# Patient Record
Sex: FEMALE | Race: WHITE | NOT HISPANIC OR LATINO | Employment: OTHER | URBAN - METROPOLITAN AREA
[De-identification: names, ages, dates, MRNs, and addresses within clinical notes are randomized per-mention and may not be internally consistent; named-entity substitution may affect disease eponyms.]

---

## 2017-04-11 ENCOUNTER — ALLSCRIPTS OFFICE VISIT (OUTPATIENT)
Dept: OTHER | Facility: OTHER | Age: 72
End: 2017-04-11

## 2017-09-11 ENCOUNTER — ALLSCRIPTS OFFICE VISIT (OUTPATIENT)
Dept: OTHER | Facility: OTHER | Age: 72
End: 2017-09-11

## 2017-10-04 ENCOUNTER — APPOINTMENT (OUTPATIENT)
Dept: PHYSICAL THERAPY | Facility: CLINIC | Age: 72
End: 2017-10-04
Payer: COMMERCIAL

## 2017-10-04 PROCEDURE — 97162 PT EVAL MOD COMPLEX 30 MIN: CPT

## 2017-10-04 PROCEDURE — G8990 OTHER PT/OT CURRENT STATUS: HCPCS

## 2017-10-04 PROCEDURE — G8991 OTHER PT/OT GOAL STATUS: HCPCS

## 2017-10-11 ENCOUNTER — APPOINTMENT (OUTPATIENT)
Dept: PHYSICAL THERAPY | Facility: CLINIC | Age: 72
End: 2017-10-11
Payer: COMMERCIAL

## 2017-10-11 PROCEDURE — 97530 THERAPEUTIC ACTIVITIES: CPT

## 2017-10-18 ENCOUNTER — APPOINTMENT (OUTPATIENT)
Dept: PHYSICAL THERAPY | Facility: CLINIC | Age: 72
End: 2017-10-18
Payer: COMMERCIAL

## 2017-10-18 PROCEDURE — 97112 NEUROMUSCULAR REEDUCATION: CPT | Performed by: PHYSICAL THERAPIST

## 2017-10-18 PROCEDURE — 97110 THERAPEUTIC EXERCISES: CPT | Performed by: PHYSICAL THERAPIST

## 2017-10-25 ENCOUNTER — APPOINTMENT (OUTPATIENT)
Dept: PHYSICAL THERAPY | Facility: CLINIC | Age: 72
End: 2017-10-25
Payer: COMMERCIAL

## 2017-11-01 ENCOUNTER — APPOINTMENT (OUTPATIENT)
Dept: PHYSICAL THERAPY | Facility: CLINIC | Age: 72
End: 2017-11-01
Payer: COMMERCIAL

## 2017-11-01 PROCEDURE — 97110 THERAPEUTIC EXERCISES: CPT

## 2017-11-08 ENCOUNTER — APPOINTMENT (OUTPATIENT)
Dept: PHYSICAL THERAPY | Facility: CLINIC | Age: 72
End: 2017-11-08
Payer: COMMERCIAL

## 2017-11-09 ENCOUNTER — APPOINTMENT (OUTPATIENT)
Dept: PHYSICAL THERAPY | Facility: CLINIC | Age: 72
End: 2017-11-09
Payer: COMMERCIAL

## 2017-11-09 PROCEDURE — G8991 OTHER PT/OT GOAL STATUS: HCPCS

## 2017-11-09 PROCEDURE — 97112 NEUROMUSCULAR REEDUCATION: CPT

## 2017-11-09 PROCEDURE — 97110 THERAPEUTIC EXERCISES: CPT

## 2017-11-09 PROCEDURE — G8990 OTHER PT/OT CURRENT STATUS: HCPCS

## 2018-01-06 ENCOUNTER — HOSPITAL ENCOUNTER (EMERGENCY)
Facility: HOSPITAL | Age: 73
Discharge: HOME | End: 2018-01-06
Attending: EMERGENCY MEDICINE
Payer: COMMERCIAL

## 2018-01-06 VITALS
HEIGHT: 63 IN | OXYGEN SATURATION: 97 % | WEIGHT: 145 LBS | DIASTOLIC BLOOD PRESSURE: 67 MMHG | RESPIRATION RATE: 18 BRPM | BODY MASS INDEX: 25.69 KG/M2 | TEMPERATURE: 97.5 F | SYSTOLIC BLOOD PRESSURE: 128 MMHG | HEART RATE: 87 BPM

## 2018-01-06 DIAGNOSIS — S50.12XA CONTUSION OF LEFT FOREARM, INITIAL ENCOUNTER: Primary | ICD-10-CM

## 2018-01-06 LAB
ALBUMIN SERPL BCP-MCNC: 3.6 G/DL (ref 3.5–5)
ALP SERPL-CCNC: 91 U/L (ref 46–116)
ALT SERPL W P-5'-P-CCNC: 9 U/L (ref 12–78)
ANION GAP SERPL CALCULATED.3IONS-SCNC: 8 MMOL/L (ref 4–13)
APTT PPP: 28 SECONDS (ref 23–35)
AST SERPL W P-5'-P-CCNC: 17 U/L (ref 5–45)
BASOPHILS # BLD AUTO: 0.07 THOUSANDS/ΜL (ref 0–0.1)
BASOPHILS NFR BLD AUTO: 1 % (ref 0–1)
BILIRUB SERPL-MCNC: 0.2 MG/DL (ref 0.2–1)
BUN SERPL-MCNC: 23 MG/DL (ref 5–25)
CALCIUM SERPL-MCNC: 8.9 MG/DL (ref 8.3–10.1)
CHLORIDE SERPL-SCNC: 105 MMOL/L (ref 100–108)
CO2 SERPL-SCNC: 28 MMOL/L (ref 21–32)
CREAT SERPL-MCNC: 0.99 MG/DL (ref 0.6–1.3)
EOSINOPHIL # BLD AUTO: 0.47 THOUSAND/ΜL (ref 0–0.61)
EOSINOPHIL NFR BLD AUTO: 5 % (ref 0–6)
ERYTHROCYTE [DISTWIDTH] IN BLOOD BY AUTOMATED COUNT: 13.1 % (ref 11.6–15.1)
GFR SERPL CREATININE-BSD FRML MDRD: 58 ML/MIN/1.73SQ M
GLUCOSE SERPL-MCNC: 114 MG/DL (ref 65–140)
HCT VFR BLD AUTO: 41.9 % (ref 34.8–46.1)
HGB BLD-MCNC: 13.9 G/DL (ref 11.5–15.4)
INR PPP: 1.04 (ref 0.86–1.16)
LYMPHOCYTES # BLD AUTO: 2.37 THOUSANDS/ΜL (ref 0.6–4.47)
LYMPHOCYTES NFR BLD AUTO: 27 % (ref 14–44)
MCH RBC QN AUTO: 29.6 PG (ref 26.8–34.3)
MCHC RBC AUTO-ENTMCNC: 33.2 G/DL (ref 31.4–37.4)
MCV RBC AUTO: 89 FL (ref 82–98)
MONOCYTES # BLD AUTO: 0.69 THOUSAND/ΜL (ref 0.17–1.22)
MONOCYTES NFR BLD AUTO: 8 % (ref 4–12)
NEUTROPHILS # BLD AUTO: 5.03 THOUSANDS/ΜL (ref 1.85–7.62)
NEUTS SEG NFR BLD AUTO: 58 % (ref 43–75)
NRBC BLD AUTO-RTO: 0 /100 WBCS
PLATELET # BLD AUTO: 232 THOUSANDS/UL (ref 149–390)
PMV BLD AUTO: 10.3 FL (ref 8.9–12.7)
POTASSIUM SERPL-SCNC: 3.9 MMOL/L (ref 3.5–5.3)
PROT SERPL-MCNC: 6.8 G/DL (ref 6.4–8.2)
PROTHROMBIN TIME: 13.8 SECONDS (ref 12.1–14.4)
RBC # BLD AUTO: 4.7 MILLION/UL (ref 3.81–5.12)
SODIUM SERPL-SCNC: 141 MMOL/L (ref 136–145)
WBC # BLD AUTO: 8.67 THOUSAND/UL (ref 4.31–10.16)

## 2018-01-06 PROCEDURE — 85025 COMPLETE CBC W/AUTO DIFF WBC: CPT | Performed by: EMERGENCY MEDICINE

## 2018-01-06 PROCEDURE — 36415 COLL VENOUS BLD VENIPUNCTURE: CPT | Performed by: EMERGENCY MEDICINE

## 2018-01-06 PROCEDURE — 85730 THROMBOPLASTIN TIME PARTIAL: CPT | Performed by: EMERGENCY MEDICINE

## 2018-01-06 PROCEDURE — 85610 PROTHROMBIN TIME: CPT | Performed by: EMERGENCY MEDICINE

## 2018-01-06 PROCEDURE — 99283 EMERGENCY DEPT VISIT LOW MDM: CPT

## 2018-01-06 PROCEDURE — 80053 COMPREHEN METABOLIC PANEL: CPT | Performed by: EMERGENCY MEDICINE

## 2018-01-06 RX ORDER — ASPIRIN 81 MG/1
325 TABLET ORAL DAILY PRN
COMMUNITY
End: 2022-02-08

## 2018-01-06 RX ORDER — RASAGILINE 1 MG/1
TABLET ORAL
COMMUNITY
End: 2018-06-06 | Stop reason: SDUPTHER

## 2018-01-06 RX ORDER — CHOLECALCIFEROL (VITAMIN D3) 50 MCG
TABLET ORAL
COMMUNITY
Start: 2017-11-13 | End: 2022-07-01 | Stop reason: CLARIF

## 2018-01-06 RX ORDER — LEVOTHYROXINE SODIUM 0.05 MG/1
50 TABLET ORAL
COMMUNITY
Start: 2017-03-17 | End: 2018-12-14 | Stop reason: SDUPTHER

## 2018-01-07 NOTE — ED PROVIDER NOTES
History  Chief Complaint   Patient presents with    Arm Pain     Pt presents with bruising to left arm that appeared last night and is tender to touch  Pt denies any recent injury  Pt takes daily baby aspirin  Patient is a 78-year-old female  She developed a spontaneous bruised her left forearm yesterday  It was worse today  She has no prior history of bruising  She denies trauma or overuse  She does not take oral anticoagulants  She does take aspirin  Symptoms are mild to moderate in intensity  No aggravating or relieving factors  She denies other associated bleeding  No hematochezia, melena cyst or melena  No hemoptysis  No hematuria  Prior to Admission Medications   Prescriptions Last Dose Informant Patient Reported? Taking? Cholecalciferol (VITAMIN D) 2000 units tablet   Yes Yes   Si daily   Cranberry 1000 MG CAPS   Yes Yes   Sig: Take 4,200 mg by mouth   Homeopathic Products (PROSACEA) GEL   Yes Yes   Sig: Apply topically   Multiple Vitamin (MULTIVITAMINS PO)   Yes Yes   Sig: daily   aspirin (ECOTRIN LOW STRENGTH) 81 mg EC tablet   Yes Yes   Sig: Take 81 mg by mouth daily   carbidopa-levodopa (SINEMET)  mg per tablet   Yes Yes   levothyroxine 50 mcg tablet   Yes Yes   Sig: Take 50 mcg by mouth   psyllium (METAMUCIL SMOOTH TEXTURE) 58 6 % powder   Yes Yes   Sig: Take by mouth   rasagiline (AZILECT) 1 MG   Yes Yes   Sig: Take by mouth   rotigotine (NEUPRO) 4 MG/24HR   Yes Yes   Sig: Place 1 patch on the skin daily      Facility-Administered Medications: None       Past Medical History:   Diagnosis Date    Disease of thyroid gland     Osteoporosis     Parkinson disease ([facility] [address] )     Rosacea        Past Surgical History:   Procedure Laterality Date    FOOT SURGERY Bilateral     HYSTERECTOMY         History reviewed  No pertinent family history  I have reviewed and agree with the history as documented      Social History   Substance Use Topics    Smoking status: Never Smoker    Smokeless tobacco: Never Used    Alcohol use No        Review of Systems   Constitutional: Negative for chills and fever  Gastrointestinal: Negative for blood in stool  Genitourinary: Negative for hematuria and vaginal bleeding  Hematological: Does not bruise/bleed easily  All other systems reviewed and are negative  Physical Exam  ED Triage Vitals [01/06/18 2128]   Temperature Pulse Respirations Blood Pressure SpO2   97 5 °F (36 4 °C) 87 18 128/67 97 %      Temp Source Heart Rate Source Patient Position - Orthostatic VS BP Location FiO2 (%)   Oral Monitor Sitting Right arm --      Pain Score       2           Orthostatic Vital Signs  Vitals:    01/06/18 2128   BP: 128/67   Pulse: 87   Patient Position - Orthostatic VS: Sitting       Physical Exam   Constitutional: She is oriented to person, place, and time  She appears well-developed and well-nourished  HENT:   Head: Normocephalic and atraumatic  Eyes: Conjunctivae are normal  Right eye exhibits no discharge  Left eye exhibits no discharge  Neck: Normal range of motion  Neck supple  Pulmonary/Chest: Effort normal  No respiratory distress  Musculoskeletal: Normal range of motion  She exhibits tenderness  She exhibits no edema or deformity  There is a contusion with tenderness to the left forearm  No bony tenderness  Neurovascular exam is normal  No palpable cords  No erythema or warmth  Neurological: She is alert and oriented to person, place, and time  Skin: Skin is warm and dry  Psychiatric: She has a normal mood and affect  Her behavior is normal    Vitals reviewed        ED Medications  Medications - No data to display    Diagnostic Studies  Results Reviewed     Procedure Component Value Units Date/Time    Comprehensive metabolic panel [[id number]]  (Abnormal) Collected:  01/06/18 2206    Lab Status:  Final result Specimen:  Blood from Arm, Right Updated:  01/06/18 2237     Sodium 141 mmol/L      Potassium 3 9 mmol/L Chloride 105 mmol/L      CO2 28 mmol/L      Anion Gap 8 mmol/L      BUN 23 mg/dL      Creatinine 0 99 mg/dL      Glucose 114 mg/dL      Calcium 8 9 mg/dL      AST 17 U/L      ALT 9 (L) U/L      Alkaline Phosphatase 91 U/L      Total Protein 6 8 g/dL      Albumin 3 6 g/dL      Total Bilirubin 0 20 mg/dL      eGFR 58 ml/min/1 73sq m     Narrative:         National Kidney Disease Education Program recommendations are as follows:  GFR calculation is accurate only with a steady state creatinine  Chronic Kidney disease less than 60 ml/min/1 73 sq  meters  Kidney failure less than 15 ml/min/1 73 sq  meters  Protime-INR [[id number]]  (Normal) Collected:  01/06/18 2206    Lab Status:  Final result Specimen:  Blood from Arm, Right Updated:  01/06/18 2228     Protime 13 8 seconds      INR 1 04    APTT [[id number]]  (Normal) Collected:  01/06/18 2206    Lab Status:  Final result Specimen:  Blood from Arm, Right Updated:  01/06/18 2228     PTT 28 seconds     Narrative:          Therapeutic Heparin Range = 60-90 seconds    CBC and differential [[id number]]  (Normal) Collected:  01/06/18 2206    Lab Status:  Final result Specimen:  Blood from Arm, Right Updated:  01/06/18 2213     WBC 8 67 Thousand/uL      RBC 4 70 Million/uL      Hemoglobin 13 9 g/dL      Hematocrit 41 9 %      MCV 89 fL      MCH 29 6 pg      MCHC 33 2 g/dL      RDW 13 1 %      MPV 10 3 fL      Platelets 170 Thousands/uL      nRBC 0 /100 WBCs      Neutrophils Relative 58 %      Lymphocytes Relative 27 %      Monocytes Relative 8 %      Eosinophils Relative 5 %      Basophils Relative 1 %      Neutrophils Absolute 5 03 Thousands/µL      Lymphocytes Absolute 2 37 Thousands/µL      Monocytes Absolute 0 69 Thousand/µL      Eosinophils Absolute 0 47 Thousand/µL      Basophils Absolute 0 07 Thousands/µL                  No orders to display              Procedures  Procedures       Phone Contacts  ED Phone Contact    ED Course  ED Course MDM  Number of Diagnoses or Management Options  Diagnosis management comments: Laboratory evaluation is normal   There is no thrombocytopenia  Doubt fracture  Doubt superficial thrombophlebitis  Doubt cellulitis  Etiology of the contusion is unclear  Nevertheless appropriate for discharge and outpatient management  Amount and/or Complexity of Data Reviewed  Clinical lab tests: ordered and reviewed      CritCare Time    Disposition  Final diagnoses:   Contusion of left forearm, initial encounter     Time reflects when diagnosis was documented in both MDM as applicable and the Disposition within this note     Time User Action Codes Description Comment    1/6/2018 11:03 PM Emmanuel Le Add [S50 [id number]] Contusion of left forearm, initial encounter       ED Disposition     ED Disposition Condition Comment    Discharge  [facility] discharge to home/self care  Condition at discharge: Good        Follow-up Information     Follow up With Specialties Details Why Contact Hiram Diehl DO Family Medicine In 1 week  [address] [place] [phone] [place] [address]  N  [phone]          Patient's Medications   Discharge Prescriptions    No medications on file     No discharge procedures on file      ED Provider  Electronically Signed by           Jere Hernández MD  01/06/18 8707

## 2018-01-07 NOTE — DISCHARGE INSTRUCTIONS

## 2018-01-11 NOTE — PROGRESS NOTES
Assessment    1  Parkinson's disease (332 0) (G20)    Plan  Parkinson's disease    · Follow-up visit in 6 months Evaluation and Treatment  Follow-up  Status: Hold For -  Scheduling  Requested for: [id number]   Ordered; For: Parkinson's disease; Ordered By: Mary Staton Performed:  Due: [id number]    Discussion/Summary  Discussion Summary:   Keyonna Doherty is doing well with present management  She tolerates her medications without adverse effect  She finds that her activity levels are almost back to baseline with medication and exercise  She will continue with her current exercise program  Questions were answered with regards to long-term prognosis an alternative mechanisms management  Chief Complaint  Chief Complaint Free Text Note Form: The Patient is a 72year old right handed lady here today following up on a history of Parkinson's  Since last seen she reports she is doing well  History of Present Illness  HPI: Keyonna Doherty reports overall she is doing well  She tolerates her medications  If anything she feels that at times she has a little bit too much energy  She denies any symptoms of fidgeting or adventitial movements  Her tremor remains under good control  She denies any adverse effects from her medication  She is back to doing most of the activities that she was able to do before her diagnosis  She attributes this both to medication and to an exercise program that she is continued to do  With this she feels that her balance is better assess her strength and endurance  Review of Systems  Neurological ROS:   Constitutional: no fever  HEENT: no eye pain, no hearing loss, no tinnitus and no dysphagia  Cardiovascular: the heart rate was not rapid or irregular and no poor circulation  Respiratory: no shortness of breath with or without exertion  Gastrointestinal: no nausea, no vomiting, no abdominal pain and no changes in bowel habits  Genitourinary: incontinence     Musculoskeletal: no arthralgias and no pain while walking  Integumentary no skin lesions  Psychiatric: no sleep problems  Hematologic/Lymphatic: unusual bleeding, but no tendency for easy bruising  Neurological General: no headache, no convulsions, no trouble falling asleep and no awakening at night  Neurological Mental Status: no memory problems  Neurological Cranial Nerves: taste or smell loss/changes, but no dysphagia  Neurological Motor findings include: no tremor and no cramping(pre/post exercise)  Neurological Coordination: no unsteadiness  Neurological Sensory: no numbness and no tingling  Neurological Gait: no difficulty walking and has not had falls  ROS Reviewed:   ROS reviewed  Active Problems    1  Anxiety disorder (300 00) (F41 9)   2  Hyperlipidemia (272 4) (E78 5)   3  Parkinson's disease (332 0) (G20)   4  Rosacea (695 3) (L71 9)   5  Thyroid disorder (246 9) (E07 9)    Past Medical History    1  History of hepatic disease (V12 79) (Z87 19)  Active Problems And Past Medical History Reviewed: The active problems and past medical history were reviewed and updated today  Surgical History    1  History of Hysterectomy   2  History of Laparoscopic Urethral Suspension For Stress Incontinence  Surgical History Reviewed: The surgical history was reviewed and updated today  Family History    1  Family history of Dementia   2  Family history of Lung Cancer (V16 1)    3  Family history of Esophageal Cancer (V16 0)   4  Family history of Heart Disease (V17 49)   5  Family history of Stroke Syndrome (V17 1)  Family History Reviewed: The family history was reviewed and updated today  Social History    · Alcohol Use (History)   · Denied: History of Caffeine Use   · Denied: History of Current Smoker   · Educational Level - Completed Masters Degree   · Never A Smoker   · Retired   · Significant other  Social History Reviewed: The social history was reviewed and updated today  Current Meds   1  Azilect 1 MG Oral Tablet; TAKE 1 TABLET DAILY; Therapy: (Recorded:19Jan2016) to Recorded   2  Carbidopa-Levodopa  MG Oral Tablet; TAKE 1 TABLET 3 TIMES DAILY; Therapy: [id number] to (Evaluate:13Apr2016)  Requested for: [id number] Recorded   3  Cranberry 500 MG Oral Capsule; take 1 capsule daily; Therapy: (Recorded:19Jan2016) to Recorded   4  Daily Multi Oral Tablet; Therapy: (Recorded:19Jan2016) to Recorded   5  Levothyroxine Sodium 50 MCG CAPS; TAKE CAPSULE Daily; Therapy: (Recorded:19Jan2016) to Recorded   6  Neupro 4 MG/24HR Transdermal Patch 24 Hour; APPY ONE PATCH TOPICALLY EVERY   24 HOURS; Therapy: 24Xkg7590 to (Evaluate:19Jun2016)  Requested for: 63Mpe6229; Last   Rx:[id number] Ordered   7  Prosacea External Gel; APPLY GM Daily; Therapy: (Recorded:19Jan2016) to Recorded   8  Triamcinolone Acetonide 0 1 % External Cream;   Therapy: (Recorded:16Oct2015) to Recorded   9  Vitamin D 1000 UNIT CAPS; Therapy: (Recorded:19Jan2016) to Recorded  Medication List Reviewed: The medication list was reviewed and updated today  Allergies    1  Statins    Vitals  Signs [Data Includes: Current Encounter]   Recorded: [id number] 12:58PM   Heart Rate: 88  Systolic: 928, LUE, Sitting  Diastolic: 88, LUE, Sitting  Height: 5 ft 2 75 in  Weight: 158 lb   BMI Calculated: 28 21  BSA Calculated: 1 74    Physical Exam    Constitutional   General appearance: No acute distress, well appearing and well nourished  HEENT/NECK: Head is atraumatic normocephalic, neck is supple  NEUROLOGIC:  Mental Status: Awake and alert without aphasia  Cranial Nerves: Extraocular movements are full  Face is symmetrical  Motor: No drift is noted on arm extension  No rest tremor is noted  Mild cogwheeling rigidity is noted in the left greater than right upper extremity  Coordination: Finger to nose testing is performed accurately   Rapid alternating movements are symmetrical  She is able to get up out of a chair with arms folded across her chest without difficulty  Gait reveals a normal base with slight decreased arm swing, left greater than right  Romberg is negative   There is no postural instability      Future Appointments    Date/Time Provider Specialty Site   05/11/2016 12:30 PM Trinidad Dancer, MD Neurology [address]   Electronically signed by : Baljeet Rivera MD; Jan 19 2016  1:32PM EST                       (Author)

## 2018-01-13 VITALS
RESPIRATION RATE: 14 BRPM | HEART RATE: 85 BPM | DIASTOLIC BLOOD PRESSURE: 78 MMHG | HEIGHT: 63 IN | SYSTOLIC BLOOD PRESSURE: 104 MMHG | WEIGHT: 152.19 LBS | BODY MASS INDEX: 26.96 KG/M2

## 2018-01-13 VITALS
DIASTOLIC BLOOD PRESSURE: 60 MMHG | SYSTOLIC BLOOD PRESSURE: 107 MMHG | HEIGHT: 63 IN | HEART RATE: 84 BPM | RESPIRATION RATE: 12 BRPM | BODY MASS INDEX: 26.82 KG/M2 | WEIGHT: 151.38 LBS

## 2018-04-03 ENCOUNTER — TELEPHONE (OUTPATIENT)
Dept: NEUROLOGY | Facility: CLINIC | Age: 73
End: 2018-04-03

## 2018-04-03 NOTE — TELEPHONE ENCOUNTER
Spoke with patient regarding appt on 05/21/18 appt that location has changed to [place] patient agreed and confirmed this switch will send pt out appt card with address

## 2018-05-21 ENCOUNTER — OFFICE VISIT (OUTPATIENT)
Dept: NEUROLOGY | Facility: CLINIC | Age: 73
End: 2018-05-21
Payer: COMMERCIAL

## 2018-05-21 VITALS
HEART RATE: 70 BPM | BODY MASS INDEX: 25.42 KG/M2 | DIASTOLIC BLOOD PRESSURE: 68 MMHG | SYSTOLIC BLOOD PRESSURE: 114 MMHG | WEIGHT: 143.5 LBS

## 2018-05-21 DIAGNOSIS — G20 PARKINSON DISEASE (HCC): Primary | ICD-10-CM

## 2018-05-21 DIAGNOSIS — M41.20 OTHER IDIOPATHIC SCOLIOSIS, UNSPECIFIED SPINAL REGION: ICD-10-CM

## 2018-05-21 PROBLEM — G20.A1 PARKINSON DISEASE: Status: ACTIVE | Noted: 2018-05-21

## 2018-05-21 PROBLEM — M41.9 SCOLIOSIS: Status: ACTIVE | Noted: 2018-05-21

## 2018-05-21 PROCEDURE — 99214 OFFICE O/P EST MOD 30 MIN: CPT | Performed by: NURSE PRACTITIONER

## 2018-05-21 NOTE — ASSESSMENT & PLAN NOTE
With some complaints of back pain, but overall stable  She is concerned because she had seen a spine specialist who recommended surgery to place rods in her back and also told her to stop doing her Parkinson's exercises because they are "making it worse " She is not interested in pursuing surgery, but would like someone to evaluate her and instruct her on which exercises may be helpful vs harmful  Will refer her to Dr Gaby Hartmann for evaluation

## 2018-05-21 NOTE — PROGRESS NOTES
Patient ID: Shwetha Canas is a 68 y o  female  Assessment/Plan:    Parkinson disease ([address] )  She looks great on exam today-no tremor and no bradykinesia noted  She did feel that she was having wearing off when only taking 3 doses of sinemet per day, and has been intermittently taking a 4th dose  I have recommended that she increase her dose to 1 tab every 3 5 hours 4 times a day consistently  I have encouraged her to pay attention to whether or not she continues to experience wearing off when doing this  If she does noticing continued wearing off at a particular time of day, she can increase to 1 5 tabs at the dose prior  She was encouraged to remain active and will call with any new or worsening symptoms  Scoliosis  With some complaints of back pain, but overall stable  She is concerned because she had seen a spine specialist who recommended surgery to place rods in her back and also told her to stop doing her Parkinson's exercises because they are "making it worse " She is not interested in pursuing surgery, but would like someone to evaluate her and instruct her on which exercises may be helpful vs harmful  Will refer her to Dr Lisseth Wood for evaluation  Subjective:    Ms Angie Light is a 68year old woman with Parkinson's disease, diagnosed May 2012, who presents for follow up  Symptoms began in October 2011 with a right foot tremor  In Fall of 2011 she noted some imbalance and later developed micrographia  She was started on Azilect 1mg with little notable improvement  Neupro patch later added  She is also on sertraline for anxiety  At her last visit she was on Sinemet 25/100 1 po 3-4 times daily, about 4 hours apart, Neupro 4mg, and Azilect  She was noting increased wearing off after 3 5 hours, so it was recommended that she take each dose every 3 5 hours QID and remain on neupro and azilect  Today Ms La Nena Corcoran presents for follow-up  She states she is noting more frequent wearing off  Over the past 2-3 weeks she has increased her dose to 1 tab QID  She also remains on neupro and azilect  She is taking sinemet 25/100 1 tab at 7:30am, 11am, 2:30pm, 6pm roughly  She does adjust the times based on her plans for the day  She does report feeling an "on" about 30 minutes after each dose  If she sticks to taking a dose every 3 5 hours, she will not notice any wearing off  If she is late for a dose, she will feel slower and tired and feels that the next dose takes long to kick in  She does note that overnight and first thing in the morning she will be slower but she is not having trouble getting in and out of bed  She is independent in all ADLs  She denies dizziness and dysphagia  She is sleeping well  She does occasionally have some vivid dreams but these are not bothersome  She does note some "hallucinations", mostly in the form of shadows or "fuzzies in the corner moving"  She is not seeing people or animals  She feels that her walking is stable, no shuffling or freezing  She does note that at night if she gets up to use the bathroom she will be slower and may shuffle her feet but she can overcome this if she focuses on it  No recent falls  She does have some back pain related to scoliosis and notes that doctor had said he wanted to put rods in her back and also recommended that she stop doing her Parkinson's exercising  She stays busy at home and is working on downsizing her home  She exercises at home  She feels that her memory is stable and does not have any concerns  The following portions of the patient's history were reviewed and updated as appropriate: problem list, medications, past medical and surgical history  Objective:    Blood pressure 114/68, pulse 70, weight 65 1 kg (143 lb 8 oz)  Physical Exam   Constitutional: She appears well-developed and well-nourished  HENT:   Head: Normocephalic  Eyes: EOM are normal  Pupils are equal, round, and reactive to light  Psychiatric: She has a normal mood and affect  Her speech is normal and behavior is normal    Vitals reviewed  Neurological Exam    Mental Status  The patient is alert  Her speech is normal  She has normal attention span and concentration  She follows multi-step commands  She has a normal fund of knowledge  Cranial Nerves    CN II: The patient's visual acuity and visual fields are normal   CN III, IV, VI: The patient's pupils are equally round and reactive to light and ocular movements are normal   CN V: The patient has normal facial sensation  CN VII:  The patient has symmetric facial movement  CN VIII:  The patient's hearing is normal   CN IX, X: The patient has symmetric palate movement and normal gag reflex  CN XI: The patient's shoulder shrug strength is normal   CN XII: The patient's tongue is midline without atrophy or fasciculations  Motor    Mild hypomimia   No rest tremor  No action tremor  No bradykinesia on FT  HG  BETTY, HT, TT  No dystonia or dyskinesia  No rigidity     Sensory  The patient's sensation is to light touch  Gait and Coordination    Arose without difficulty  Normal stride with decreased right arm swing  Normal pull test          ROS:    Review of Systems   Constitutional: Positive for fatigue  HENT: Negative  Eyes: Negative  Respiratory: Negative  Cardiovascular: Positive for chest pain  Gastrointestinal: Negative  Endocrine: Negative  Genitourinary: Positive for difficulty urinating  Musculoskeletal: Positive for back pain  Lower back pain    Skin: Negative  Allergic/Immunologic: Negative  Neurological: Positive for tremors  Hematological: Negative  Psychiatric/Behavioral: The patient is nervous/anxious

## 2018-05-21 NOTE — ASSESSMENT & PLAN NOTE
She looks great on exam today-no tremor and no bradykinesia noted  She did feel that she was having wearing off when only taking 3 doses of sinemet per day, and has been intermittently taking a 4th dose  I have recommended that she increase her dose to 1 tab every 3 5 hours 4 times a day consistently  I have encouraged her to pay attention to whether or not she continues to experience wearing off when doing this  If she does noticing continued wearing off at a particular time of day, she can increase to 1 5 tabs at the dose prior  She was encouraged to remain active and will call with any new or worsening symptoms

## 2018-05-21 NOTE — PATIENT INSTRUCTIONS
1  Start taking 1 tab every 3 5 hours four times a day consistently  2  Pay attention to whether or not you are having wearing off at certain times of the day  If for instance, you notice that you are having wearing off between your 1st morning dose and your 2nd dose, you can try to increase to 1 5 tabs for your first dose  3  I will refer you to Dr Evelena Eisenmenger, to help assess your back and which exercises may be helpful or harmful for you

## 2018-06-06 DIAGNOSIS — G20 PARKINSON'S DISEASE (HCC): Primary | ICD-10-CM

## 2018-06-06 RX ORDER — RASAGILINE 1 MG/1
1 TABLET ORAL DAILY
Qty: 30 EACH | Refills: 3 | Status: SHIPPED | OUTPATIENT
Start: 2018-06-06 | End: 2018-10-05 | Stop reason: SDUPTHER

## 2018-06-28 ENCOUNTER — OFFICE VISIT (OUTPATIENT)
Dept: NEUROLOGY | Facility: CLINIC | Age: 73
End: 2018-06-28
Payer: COMMERCIAL

## 2018-06-28 VITALS
SYSTOLIC BLOOD PRESSURE: 122 MMHG | BODY MASS INDEX: 25.02 KG/M2 | DIASTOLIC BLOOD PRESSURE: 74 MMHG | HEIGHT: 63 IN | WEIGHT: 141.2 LBS | HEART RATE: 84 BPM

## 2018-06-28 DIAGNOSIS — M54.50 ACUTE BILATERAL LOW BACK PAIN WITHOUT SCIATICA: Primary | ICD-10-CM

## 2018-06-28 DIAGNOSIS — M41.06 INFANTILE IDIOPATHIC SCOLIOSIS OF LUMBAR REGION: ICD-10-CM

## 2018-06-28 PROCEDURE — 99214 OFFICE O/P EST MOD 30 MIN: CPT | Performed by: PHYSICAL MEDICINE & REHABILITATION

## 2018-06-28 NOTE — PROGRESS NOTES
Physical Medicine & Rehabilitation New Patient Evaluation  Jason Ham 68 y o  female    Referred by: Denis Ghotra      ASSESSMENT/PLAN:     Sharon Trivedi was seen today for chronic lower back pain  Patient has a known history of infantile lumbar scoliosis and a history of Parkinson's disease  She has overall functioning very well  She presents today desiring a tailored exercise program for her back and Parkinson's disease  Diagnoses and all orders for this visit:    Acute bilateral low back pain without sciatica, Infantile idiopathic scoliosis of lumbar region  - lower back pain etiology is a combination of myofascial pain and altered biomechanics secondary to levoscoliosis and kyphosis  -I personally have counseled patient on resuming her daily exercises for disease modification of Parkinson's as well as counseled patient on continuing her home exercise program which was instructed by physical therapy for her lower back  I have asked her to avoid specific exercises which may aggravate her back positioning and worsen her pain  Patient understands these instructions  -patient would like to follow a conservative plan:  I have instructed her to trial topical menthol cream versus patches  In the past she was fearful of taking Tylenol due to previous hepatitis  I have reviewed her last liver function tests which are within normal limits  I have instructed her if her pain is severe that she can take 325-650 mg of Tylenol twice a day as needed  -prescription provided for a quick draw a LSO:  Lumbar Back Brace to be used as needed when patient requires back support for further distance walking or long duration sitting   -if pain is persistent, we will pursue repeat imaging, possible repeat physical therapy with specific modalities, and consider in office as well as interventional injections        *I have spent 60 minutes with Patient  today in which greater than 50% of this time was spent in counseling/coordination of care regarding Intructions for management, Patient and family education, Impressions and reviewing short term and management  HPI:   Lexii Cruz 68 y o  female right handed, with  has a past medical history of Disease of thyroid gland; Osteoporosis; Parkinson disease ([address] ); and Rosacea  Old records were reviewed personally  Diagnosed with Parkinson's Disease 12 years ago and stable on Sinemet, Azilect, Neupro  Patient is managed by Dr Michael Low [facility]  Patient has left leg mild tremors that are still persistent  Expanded Social History:  Patient lives with friend/partner and her daughter in a single family home with 2 steps to enter  Retired as a  for [employer]: Yes      Function:   Independent with mobility and self care  When she ambulates longer distances or on uneven surfaces she uses the rollator    SUBJECTIVE:  Patient presents today in the office with chief complaint of lower back pain  Her primary care doctor took an x-ray of the lower back  Then she was referred to Orthopedics for evaluation of back and hip  They found OA of the right hip and she was recommended to have surgery with one doctor  She saw another orthopedic physician for second opinion who said she could wait  Earlier this year she saw a spine surgeon in [facility] who recommended spine surgery  Patient was sent to PT  Patient did PT in [facility] AT [facility] for 5 weeks and she was taught a comprehensive home exercise program for the back  She also actively does daily exercises with Delay the Disease by Scarlette Hatchet  Lower back pain - located centrally and bilaterally  Intermittent  When occurs feels it is moderate intensity  Has tried a heating pad which is helpful  Also PT is helpful with pain  No weakness or numbness in legs  No new incontinence  No fevers  Patient over the last 3 years have intentionally lost about 30lbs        Review of Systems:  Review of Systems   Constitutional: Negative  HENT: Negative  Eyes: Negative  Respiratory: Negative  Cardiovascular: Negative  Gastrointestinal: Negative  Endocrine: Negative  Genitourinary: Negative  Musculoskeletal: Positive for back pain  Skin: Negative  Allergic/Immunologic: Negative  Neurological: Negative  Hematological: Negative  Psychiatric/Behavioral: Negative          OBJECTIVE:   /74 (BP Location: Right arm, Patient Position: Sitting, Cuff Size: Adult)   Pulse 84   Ht 5' 3" (1 6 m)   Wt 64 kg (141 lb 3 2 oz)   BMI 25 01 kg/m²      Physical Exam  Physical Exam   Constitutional: She appears well-developed and well-nourished  HENT:   Head: Normocephalic and atraumatic  Eyes: EOM are normal  Pupils are equal, round, and reactive to light  Cardiovascular: Normal rate and regular rhythm  Pulmonary/Chest: Breath sounds normal  She has no wheezes  She has no rales  Abdominal: Soft  Bowel sounds are normal  She exhibits no distension  There is no tenderness  Skin: Skin is warm  Psychiatric: She has a normal mood and affect  Nursing note and vitals reviewed  Musculoskeletal: Passive, Active range of motion functional x 4  Lumbar Exam:  Lumbar levoscoliososis  Thoracic kyphosis   Lumbar ROM is fair - no pain on todays exam  Palpation with mild tenderness in lower paraspinal muscles bilaterally  Negative SLR  No resting tremor  Mild bradykinesia in the left proximal leg    Neuro:  Sensation to light touch intact   Gait:   Motor Exam:   Right Left Site Right Left Site     S Ab:  Shoulder Abductors 5 5 HF:  Hip Flexors     EF:  Elbow Flexors 5 5 H Ab:   Hip Abductors     EE:  Elbow Extensors 5 5 KF: Knee Flexors     WE:  Wrist Extensors 5 5 KE:  Knee Extensors     FF:  Finger Flexors 5 5 DR:  Dorsi Flexors     HI:  Hand Intrinsics 5 5 EHL: Ext Cordero Longus      5 5 PF:  Plantar Flexors       Imaging: none to review      Past Medical History: Diagnosis Date    Disease of thyroid gland     Osteoporosis     Parkinson disease ([facility] )     Rosacea        Patient Active Problem List    Diagnosis Date Noted    Acute bilateral low back pain without sciatica 06/28/2018    Parkinson disease ([facility] ) 05/21/2018    Scoliosis 05/21/2018       Past Surgical History:   Procedure Laterality Date    FOOT SURGERY Bilateral     HYSTERECTOMY         Family History   Problem Relation Age of Onset    Lung cancer Mother     Lung cancer Father     Esophageal cancer Brother        Social History     Allergies   Allergen Reactions    Dust Mite Extract     Pollen Extract     Simvastatin Other (See Comments)     Jaundice    Statins Other (See Comments)         Current Outpatient Prescriptions:     aspirin (ECOTRIN LOW STRENGTH) 81 mg EC tablet, Take 81 mg by mouth daily, Disp: , Rfl:     carbidopa-levodopa (SINEMET)  mg per tablet, Take 4 and 1/2 tabs per day , Disp: 135 tablet, Rfl: 3    Cholecalciferol (VITAMIN D) 2000 units tablet, 1 daily, Disp: , Rfl:     Cranberry 1000 MG CAPS, Take 4,200 mg by mouth, Disp: , Rfl:     Homeopathic Products (PROSACEA) GEL, Apply topically, Disp: , Rfl:     levothyroxine 50 mcg tablet, Take 50 mcg by mouth, Disp: , Rfl:     Multiple Vitamin (MULTIVITAMINS PO), daily, Disp: , Rfl:     psyllium (METAMUCIL SMOOTH TEXTURE) 58 6 % powder, Take by mouth daily as needed  , Disp: , Rfl:     rasagiline (AZILECT) 1 MG, Take 1 tablet (1 mg total) by mouth daily, Disp: 30 each, Rfl: 3    rotigotine (NEUPRO) 4 MG/24HR, Place 1 patch on the skin daily, Disp: , Rfl:

## 2018-06-28 NOTE — PATIENT INSTRUCTIONS
-For moderate pain: Try Menthol cream or patches - apply cream to lower back 3-4x/day as needed  Apply patches to lower back every 12 hours as needed  -If pain is severe - ok to take 325-650mg Tylenol twice a day        -Go to [facility] to obtain your back brace  Use this as needed for back support

## 2018-07-11 ENCOUNTER — TELEPHONE (OUTPATIENT)
Dept: NEUROLOGY | Facility: CLINIC | Age: 73
End: 2018-07-11

## 2018-07-11 DIAGNOSIS — G20 PARKINSON'S DISEASE (HCC): ICD-10-CM

## 2018-07-11 NOTE — TELEPHONE ENCOUNTER
Pt called in stated that her sinemet was recently changed to 1 2 at 7:30am and 1 at 11, 2:30 and 6  Pt states that she is not doing well, and she thinks she needs an increase  States that she feels they wear off in 3 5 hours       Pt # [phone]

## 2018-07-25 ENCOUNTER — APPOINTMENT (EMERGENCY)
Dept: CT IMAGING | Facility: HOSPITAL | Age: 73
End: 2018-07-25
Payer: COMMERCIAL

## 2018-07-25 ENCOUNTER — HOSPITAL ENCOUNTER (EMERGENCY)
Facility: HOSPITAL | Age: 73
Discharge: HOME | End: 2018-07-25
Attending: EMERGENCY MEDICINE | Admitting: EMERGENCY MEDICINE
Payer: COMMERCIAL

## 2018-07-25 VITALS
DIASTOLIC BLOOD PRESSURE: 72 MMHG | BODY MASS INDEX: 24.32 KG/M2 | SYSTOLIC BLOOD PRESSURE: 145 MMHG | OXYGEN SATURATION: 99 % | RESPIRATION RATE: 16 BRPM | TEMPERATURE: 97.5 F | WEIGHT: 137.3 LBS | HEART RATE: 77 BPM

## 2018-07-25 DIAGNOSIS — K59.00 CONSTIPATION: ICD-10-CM

## 2018-07-25 DIAGNOSIS — R10.9 ABDOMINAL PAIN: Primary | ICD-10-CM

## 2018-07-25 LAB
ALBUMIN SERPL BCP-MCNC: 3.6 G/DL (ref 3.5–5)
ALP SERPL-CCNC: 60 U/L (ref 46–116)
ALT SERPL W P-5'-P-CCNC: 8 U/L (ref 12–78)
ANION GAP SERPL CALCULATED.3IONS-SCNC: 8 MMOL/L (ref 4–13)
AST SERPL W P-5'-P-CCNC: 14 U/L (ref 5–45)
BACTERIA UR QL AUTO: ABNORMAL /HPF
BASOPHILS # BLD AUTO: 0.06 THOUSANDS/ΜL (ref 0–0.1)
BASOPHILS NFR BLD AUTO: 1 % (ref 0–1)
BILIRUB SERPL-MCNC: 0.2 MG/DL (ref 0.2–1)
BILIRUB UR QL STRIP: NEGATIVE
BUN SERPL-MCNC: 21 MG/DL (ref 5–25)
CALCIUM SERPL-MCNC: 8.7 MG/DL (ref 8.3–10.1)
CHLORIDE SERPL-SCNC: 104 MMOL/L (ref 100–108)
CLARITY UR: ABNORMAL
CO2 SERPL-SCNC: 26 MMOL/L (ref 21–32)
COLOR UR: YELLOW
CREAT SERPL-MCNC: 0.91 MG/DL (ref 0.6–1.3)
EOSINOPHIL # BLD AUTO: 0.26 THOUSAND/ΜL (ref 0–0.61)
EOSINOPHIL NFR BLD AUTO: 3 % (ref 0–6)
ERYTHROCYTE [DISTWIDTH] IN BLOOD BY AUTOMATED COUNT: 13.6 % (ref 11.6–15.1)
GFR SERPL CREATININE-BSD FRML MDRD: 63 ML/MIN/1.73SQ M
GLUCOSE SERPL-MCNC: 106 MG/DL (ref 65–140)
GLUCOSE UR STRIP-MCNC: NEGATIVE MG/DL
HCT VFR BLD AUTO: 40.7 % (ref 34.8–46.1)
HGB BLD-MCNC: 13.4 G/DL (ref 11.5–15.4)
HGB UR QL STRIP.AUTO: NEGATIVE
IMM GRANULOCYTES # BLD AUTO: 0.01 THOUSAND/UL (ref 0–0.2)
IMM GRANULOCYTES NFR BLD AUTO: 0 % (ref 0–2)
KETONES UR STRIP-MCNC: ABNORMAL MG/DL
LEUKOCYTE ESTERASE UR QL STRIP: ABNORMAL
LIPASE SERPL-CCNC: 142 U/L (ref 73–393)
LYMPHOCYTES # BLD AUTO: 2 THOUSANDS/ΜL (ref 0.6–4.47)
LYMPHOCYTES NFR BLD AUTO: 25 % (ref 14–44)
MAGNESIUM SERPL-MCNC: 2.4 MG/DL (ref 1.6–2.6)
MCH RBC QN AUTO: 29.5 PG (ref 26.8–34.3)
MCHC RBC AUTO-ENTMCNC: 32.9 G/DL (ref 31.4–37.4)
MCV RBC AUTO: 90 FL (ref 82–98)
MONOCYTES # BLD AUTO: 0.7 THOUSAND/ΜL (ref 0.17–1.22)
MONOCYTES NFR BLD AUTO: 9 % (ref 4–12)
NEUTROPHILS # BLD AUTO: 4.9 THOUSANDS/ΜL (ref 1.85–7.62)
NEUTS SEG NFR BLD AUTO: 62 % (ref 43–75)
NITRITE UR QL STRIP: NEGATIVE
NON-SQ EPI CELLS URNS QL MICRO: ABNORMAL /HPF
NRBC BLD AUTO-RTO: 0 /100 WBCS
PH UR STRIP.AUTO: 5.5 [PH] (ref 4.5–8)
PLATELET # BLD AUTO: 199 THOUSANDS/UL (ref 149–390)
PMV BLD AUTO: 10.5 FL (ref 8.9–12.7)
POTASSIUM SERPL-SCNC: 3.9 MMOL/L (ref 3.5–5.3)
PROT SERPL-MCNC: 6.7 G/DL (ref 6.4–8.2)
PROT UR STRIP-MCNC: NEGATIVE MG/DL
RBC # BLD AUTO: 4.55 MILLION/UL (ref 3.81–5.12)
RBC #/AREA URNS AUTO: ABNORMAL /HPF
SODIUM SERPL-SCNC: 138 MMOL/L (ref 136–145)
SP GR UR STRIP.AUTO: >=1.03 (ref 1–1.03)
TSH SERPL DL<=0.05 MIU/L-ACNC: 0.65 UIU/ML (ref 0.36–3.74)
UROBILINOGEN UR QL STRIP.AUTO: 0.2 E.U./DL
WBC # BLD AUTO: 7.93 THOUSAND/UL (ref 4.31–10.16)
WBC #/AREA URNS AUTO: ABNORMAL /HPF

## 2018-07-25 PROCEDURE — 85025 COMPLETE CBC W/AUTO DIFF WBC: CPT | Performed by: EMERGENCY MEDICINE

## 2018-07-25 PROCEDURE — 83735 ASSAY OF MAGNESIUM: CPT | Performed by: EMERGENCY MEDICINE

## 2018-07-25 PROCEDURE — 96361 HYDRATE IV INFUSION ADD-ON: CPT

## 2018-07-25 PROCEDURE — 81001 URINALYSIS AUTO W/SCOPE: CPT | Performed by: EMERGENCY MEDICINE

## 2018-07-25 PROCEDURE — 96374 THER/PROPH/DIAG INJ IV PUSH: CPT

## 2018-07-25 PROCEDURE — 99284 EMERGENCY DEPT VISIT MOD MDM: CPT

## 2018-07-25 PROCEDURE — 84443 ASSAY THYROID STIM HORMONE: CPT | Performed by: EMERGENCY MEDICINE

## 2018-07-25 PROCEDURE — 74177 CT ABD & PELVIS W/CONTRAST: CPT

## 2018-07-25 PROCEDURE — 80053 COMPREHEN METABOLIC PANEL: CPT | Performed by: EMERGENCY MEDICINE

## 2018-07-25 PROCEDURE — 36415 COLL VENOUS BLD VENIPUNCTURE: CPT | Performed by: EMERGENCY MEDICINE

## 2018-07-25 PROCEDURE — 83690 ASSAY OF LIPASE: CPT | Performed by: EMERGENCY MEDICINE

## 2018-07-25 RX ORDER — ONDANSETRON 2 MG/ML
4 INJECTION INTRAMUSCULAR; INTRAVENOUS ONCE
Status: COMPLETED | OUTPATIENT
Start: 2018-07-25 | End: 2018-07-25

## 2018-07-25 RX ORDER — POLYETHYLENE GLYCOL 3350 17 G/17G
17 POWDER, FOR SOLUTION ORAL DAILY
Qty: 14 EACH | Refills: 0 | Status: SHIPPED | OUTPATIENT
Start: 2018-07-25 | End: 2018-08-24

## 2018-07-25 RX ORDER — DOCUSATE SODIUM 100 MG/1
100 CAPSULE, LIQUID FILLED ORAL 2 TIMES DAILY
Qty: 60 CAPSULE | Refills: 0 | Status: SHIPPED | OUTPATIENT
Start: 2018-07-25 | End: 2018-10-17

## 2018-07-25 RX ADMIN — ONDANSETRON 4 MG: 2 INJECTION INTRAMUSCULAR; INTRAVENOUS at 20:47

## 2018-07-25 RX ADMIN — IOHEXOL 100 ML: 350 INJECTION, SOLUTION INTRAVENOUS at 22:50

## 2018-07-25 RX ADMIN — SODIUM CHLORIDE 500 ML: 0.9 INJECTION, SOLUTION INTRAVENOUS at 20:45

## 2018-07-25 RX ADMIN — IOHEXOL 50 ML: 240 INJECTION, SOLUTION INTRATHECAL; INTRAVASCULAR; INTRAVENOUS; ORAL at 20:46

## 2018-07-26 NOTE — ED PROVIDER NOTES
History  Chief Complaint   Patient presents with    Abdominal Pain     pt states to have had abd pain for the past 3 weeks  pt was seen by PCP last week  pt states to have thought it was constipation, states to not have BM for 4 days  pt states to take miralax and states to now have diarrhea for past few days with c/o abdominal pain in mid lower abdominal area  pt also c/o rectal pain     20-year-old female with a history of hypothyroid presenting chief complaint of abdominal pain and constipation  Patient reports for last 4-6 weeks she has had change in her bowel movements and abdominal discomfort, she describes her the bowel movements is typically 2 days apart she states over the last 4-6 weeks she has been a week or 2 in between bowel movements and gets generalized crampy intermittent abdominal discomfort she has been to seen twice by her family medicine advanced practitioner, given Dulcolax as well as MiraLax, although she has ongoing discomfort now with some mild rectal discomfort  Her pain is localized as central, it radiates generally comes and goes at random despite is a crampy spasm pain no other exacerbating remitting factors she notes nausea but no vomiting no weight loss or change in appetite no other change in bowels or bladder urinary symptoms joint pain swelling rashes or other associated symptoms  Her surgical history significant for vaginal hysterectomy, last colonoscopy was 8 and a half years ago  Prior to Admission Medications   Prescriptions Last Dose Informant Patient Reported? Taking?    Cholecalciferol (VITAMIN D) 2000 units tablet   Yes No   Si daily   Cranberry 1000 MG CAPS   Yes No   Sig: Take 4,200 mg by mouth   Homeopathic Products (PROSACEA) GEL   Yes No   Sig: Apply topically   Multiple Vitamin (MULTIVITAMINS PO)   Yes No   Sig: daily   aspirin (ECOTRIN LOW STRENGTH) 81 mg EC tablet   Yes No   Sig: Take 81 mg by mouth daily   carbidopa-levodopa (SINEMET)  mg per tablet   No No   Sig: Take 1 5 tabs QID   levothyroxine 50 mcg tablet   Yes No   Sig: Take 50 mcg by mouth   psyllium (METAMUCIL SMOOTH TEXTURE) 58 6 % powder   Yes No   Sig: Take by mouth daily as needed     rasagiline (AZILECT) 1 MG   No No   Sig: Take 1 tablet (1 mg total) by mouth daily   rotigotine (NEUPRO) 4 MG/24HR   Yes No   Sig: Place 1 patch on the skin daily      Facility-Administered Medications: None       Past Medical History:   Diagnosis Date    Disease of thyroid gland     Osteoporosis     [facility]     Rosacea        Past Surgical History:   Procedure Laterality Date    FOOT SURGERY Bilateral     HYSTERECTOMY         Family History   Problem Relation Age of Onset    Lung cancer Mother     Lung cancer Father     Esophageal cancer Brother      I have reviewed and agree with the history as documented  Social History   Substance Use Topics    Smoking status: Never Smoker    Smokeless tobacco: Never Used    Alcohol use No        Review of Systems   Constitutional: Negative for activity change, appetite change, chills and fever  HENT: Negative for rhinorrhea and sore throat  Eyes: Negative for photophobia and pain  Respiratory: Negative for cough and shortness of breath  Cardiovascular: Negative for chest pain and palpitations  Gastrointestinal: Positive for abdominal distention, abdominal pain and constipation  Negative for anal bleeding, blood in stool, diarrhea, nausea and vomiting  Genitourinary: Negative for dysuria, frequency and urgency  Musculoskeletal: Negative for arthralgias, back pain and myalgias  Skin: Negative for color change and rash  Neurological: Negative for dizziness, weakness and light-headedness  Hematological: Negative for adenopathy  Does not bruise/bleed easily  Psychiatric/Behavioral: Negative for agitation and behavioral problems  All other systems reviewed and are negative        Physical Exam  Physical Exam Constitutional: She is oriented to person, place, and time  She appears well-developed and well-nourished  No distress  Very well-appearing in no acute distress   HENT:   Head: Normocephalic and atraumatic  Eyes: EOM are normal  Pupils are equal, round, and reactive to light  Neck: Normal range of motion  Neck supple  No tracheal deviation present  Cardiovascular: Normal rate, regular rhythm and normal heart sounds  Exam reveals no gallop and no friction rub  No murmur heard  Pulmonary/Chest: Effort normal and breath sounds normal  She has no wheezes  She has no rales  Abdominal: Soft  Bowel sounds are normal  She exhibits no distension  There is tenderness  There is no rebound and no guarding  Abdomen soft mildly generally tender no rebound or guarding   Musculoskeletal: Normal range of motion  She exhibits no edema or tenderness  Neurological: She is alert and oriented to person, place, and time  No cranial nerve deficit  She exhibits normal muscle tone  Coordination normal    Skin: Skin is warm and dry  No rash noted  Psychiatric: She has a normal mood and affect  Her behavior is normal    Nursing note and vitals reviewed        Vital Signs  ED Triage Vitals   Temperature Pulse Respirations Blood Pressure SpO2   07/25/18 2205 07/25/18 1956 07/25/18 1956 07/25/18 1956 07/25/18 1956   97 5 °F (36 4 °C) 86 16 134/84 94 %      Temp Source Heart Rate Source Patient Position - Orthostatic VS BP Location FiO2 (%)   07/25/18 2205 07/25/18 1956 07/25/18 1956 07/25/18 1956 --   Oral Monitor Sitting Right arm       Pain Score       07/25/18 1956       5           Vitals:    07/25/18 1956 07/25/18 2205   BP: 134/84 145/72   Pulse: 86 77   Patient Position - Orthostatic VS: Sitting Lying       Visual Acuity      ED Medications  Medications   sodium chloride 0 9 % bolus 500 mL (0 mL Intravenous Stopped 7/25/18 2155)   ondansetron (ZOFRAN) injection 4 mg (4 mg Intravenous Given 7/25/18 2047)   iohexol (OMNIPAQUE) 240 MG/ML solution 50 mL (50 mL Oral Given 7/25/18 2046)   iohexol (OMNIPAQUE) 350 MG/ML injection (MULTI-DOSE) 100 mL (100 mL Intravenous Given 7/25/18 2250)       Diagnostic Studies  Results Reviewed     Procedure Component Value Units Date/Time    Urine Microscopic [[id number]]  (Abnormal) Collected:  07/25/18 2210    Lab Status:  Final result Specimen:  Urine from Urine, Clean Catch Updated:  07/25/18 2225     RBC, UA 0-1 (A) /hpf      WBC, UA 4-10 (A) /hpf      Epithelial Cells Moderate (A) /hpf      Bacteria, UA Occasional /hpf     UA w Reflex to Microscopic w Reflex to Culture [[id number]]  (Abnormal) Collected:  07/25/18 2210    Lab Status:  Final result Specimen:  Urine from Urine, Clean Catch Updated:  07/25/18 2221     Color, UA Yellow     Clarity, UA Slightly Cloudy     Specific Gravity, UA >=1 030     pH, UA 5 5     Leukocytes, UA Small (A)     Nitrite, UA Negative     Protein, UA Negative mg/dl      Glucose, UA Negative mg/dl      Ketones, UA 15 (1+) (A) mg/dl      Urobilinogen, UA 0 2 E U /dl      Bilirubin, UA Negative     Blood, UA Negative    Lipase [[id number]]  (Normal) Collected:  07/25/18 2044    Lab Status:  Final result Specimen:  Blood from Arm, Right Updated:  07/25/18 2113     Lipase 142 u/L     Magnesium [[id number]]  (Normal) Collected:  07/25/18 2044    Lab Status:  Final result Specimen:  Blood from Arm, Right Updated:  07/25/18 2113     Magnesium 2 4 mg/dL     TSH [[id number]]  (Normal) Collected:  07/25/18 2044    Lab Status:  Final result Specimen:  Blood from Arm, Right Updated:  07/25/18 2113     TSH 3RD GENERATON 0 651 uIU/mL     Narrative:         Patients undergoing fluorescein dye angiography may retain small amounts of fluorescein in the body for 48-72 hours post procedure  Samples containing fluorescein can produce falsely depressed TSH values  If the patient had this procedure,a specimen should be resubmitted post fluorescein clearance            The recommended reference ranges for TSH during pregnancy are as follows:  First trimester 0 1 to 2 5 uIU/mL  Second trimester  0 2 to 3 0 uIU/mL  Third trimester 0 3 to 3 0 uIU/m      Comprehensive metabolic panel [[id number]]  (Abnormal) Collected:  07/25/18 2044    Lab Status:  Final result Specimen:  Blood from Arm, Right Updated:  07/25/18 2111     Sodium 138 mmol/L      Potassium 3 9 mmol/L      Chloride 104 mmol/L      CO2 26 mmol/L      Anion Gap 8 mmol/L      BUN 21 mg/dL      Creatinine 0 91 mg/dL      Glucose 106 mg/dL      Calcium 8 7 mg/dL      AST 14 U/L      ALT 8 (L) U/L      Alkaline Phosphatase 60 U/L      Total Protein 6 7 g/dL      Albumin 3 6 g/dL      Total Bilirubin 0 20 mg/dL      eGFR 63 ml/min/1 73sq m     Narrative:         National Kidney Disease Education Program recommendations are as follows:  GFR calculation is accurate only with a steady state creatinine  Chronic Kidney disease less than 60 ml/min/1 73 sq  meters  Kidney failure less than 15 ml/min/1 73 sq  meters  CBC and differential [[id number]] Collected:  07/25/18 2044    Lab Status:  Final result Specimen:  Blood from Arm, Right Updated:  07/25/18 2051     WBC 7 93 Thousand/uL      RBC 4 55 Million/uL      Hemoglobin 13 4 g/dL      Hematocrit 40 7 %      MCV 90 fL      MCH 29 5 pg      MCHC 32 9 g/dL      RDW 13 6 %      MPV 10 5 fL      Platelets 500 Thousands/uL      nRBC 0 /100 WBCs      Neutrophils Relative 62 %      Immat GRANS % 0 %      Lymphocytes Relative 25 %      Monocytes Relative 9 %      Eosinophils Relative 3 %      Basophils Relative 1 %      Neutrophils Absolute 4 90 Thousands/µL      Immature Grans Absolute 0 01 Thousand/uL      Lymphocytes Absolute 2 00 Thousands/µL      Monocytes Absolute 0 70 Thousand/µL      Eosinophils Absolute 0 26 Thousand/µL      Basophils Absolute 0 06 Thousands/µL                  CT abdomen pelvis with contrast   Final Result by Pro Hagan MD (07/25 2309)         1    No evidence of bowel obstruction, colitis or diverticulitis  2   No pyelonephritis or obstructive uropathy  Workstation performed: [id number]                    Procedures  Procedures       Phone Contacts  ED Phone Contact    ED Course  ED Course as of Jul 26 1159   Wed Jul 25, 2018   2243 Epithelial Cells: (!) Moderate   2243 WBC, UA: (!) 4-10   2243 No urinary symptoms                                MDM  Number of Diagnoses or Management Options  Abdominal pain:   Constipation:   Diagnosis management comments: 76-year-old female history of vaginal hysterectomy with approximately 4-6 weeks of change in stooling, constipation proved with over-the-counter medications abdominal discomfort no history of similar no weight loss constitutional symptoms nausea vomiting on exam here she is afebrile normal vital signs she is clinically well appearing she has mild generalized abdominal tenderness no rebound or guarding no flank or CVA tenderness given her advanced age in change in bowel movements, will check abdominal labs, urinalysis, will CT with IV and p o  contrast, if negative patient will require GI follow-up close return instructions disposition pending further evaluation and reassessment    CritCare Time    Disposition  Final diagnoses:   Abdominal pain   Constipation     Time reflects when diagnosis was documented in both MDM as applicable and the Disposition within this note     Time User Action Codes Description Comment    7/25/2018 11:19 PM Dean VINCENT Add [R10 9] Abdominal pain     7/25/2018 11:20 PM Hossein Crum Add [K59 00] Constipation       ED Disposition     ED Disposition Condition Comment    Discharge  [facility] discharge to home/self care      Condition at discharge: Good        Follow-up Information     Follow up With Specialties Details Why Contact Info Additional [address] [facility] Emergency Department Emergency Medicine  If symptoms worsen [place] [address] ED, 819 [facility] [place], [place], [address]  [facility] In 1 week  Jeremie Sandeep [address]           Discharge Medication List as of 7/25/2018 11:24 PM      START taking these medications    Details   docusate sodium (COLACE) 100 mg capsule Take 1 capsule (100 mg total) by mouth 2 (two) times a day for 30 days, Starting Wed 7/25/2018, Until Fri 8/24/2018, Print      polyethylene glycol (MIRALAX) 17 g packet Take 17 g by mouth daily for 7 days, Starting Wed 7/25/2018, Until Wed 8/1/2018, Print         CONTINUE these medications which have NOT CHANGED    Details   aspirin (ECOTRIN LOW STRENGTH) 81 mg EC tablet Take 81 mg by mouth daily, Historical Med      carbidopa-levodopa (SINEMET)  mg per tablet Take 1 5 tabs QID, Normal      Cholecalciferol (VITAMIN D) 2000 units tablet 1 daily, Historical Med      Cranberry 1000 MG CAPS Take 4,200 mg by mouth, Historical Med      Homeopathic Products (PROSACEA) GEL Apply topically, Historical Med      levothyroxine 50 mcg tablet Take 50 mcg by mouth, Starting Fri 3/17/2017, Historical Med      Multiple Vitamin (MULTIVITAMINS PO) daily, Historical Med      psyllium (METAMUCIL SMOOTH TEXTURE) 58 6 % powder Take by mouth daily as needed  , Starting Fri 11/1/2013, Historical Med      rasagiline (AZILECT) 1 MG Take 1 tablet (1 mg total) by mouth daily, Starting Wed 6/6/2018, Normal      rotigotine (NEUPRO) 4 MG/24HR Place 1 patch on the skin daily, Historical Med           No discharge procedures on file      ED Provider  Electronically Signed by           Marilyn Hebert DO  07/26/18 0421

## 2018-07-26 NOTE — DISCHARGE INSTRUCTIONS
Please return if you developed worsening or other concerning symptoms otherwise please follow up with a gastroenterologist for possible colonoscopy and further evaluation as instructed as discussed     Abdominal Pain, Ambulatory Care   GENERAL INFORMATION:   Abdominal pain  can be dull, achy, or sharp  You may have pain in one area of your abdomen, or in your entire abdomen  Your pain may be caused by constipation, food sensitivity or poisoning, infection, or a blockage  Abdominal pain can also be caused by a hernia, appendicitis, or an ulcer  The cause of your abdominal pain may be unknown  Seek immediate care for the following symptoms:   · New chest pain or shortness of breath    · Pulsing pain in your upper abdomen or lower back that suddenly becomes constant    · Pain in the right lower abdominal area that worsens with movement    · Fever over 100 4°F (38°C) or shaking chills    · Vomiting and you cannot keep food or fluids down    · Pain does not improve or gets worse over the next 8 to 12 hours    · Blood in your vomit or bowel movements, or they look black and tarry    · Skin or the whites of your eyes turn yellow    · Large amount of vaginal bleeding that is not your monthly period  Treatment for abdominal pain  may include medicine to calm your stomach, prevent vomiting, or decrease pain  Follow up with your healthcare provider as directed:  Write down your questions so you remember to ask them during your visits  CARE AGREEMENT:   You have the right to help plan your care  Learn about your health condition and how it may be treated  Discuss treatment options with your caregivers to decide what care you want to receive  You always have the right to refuse treatment  The above information is an  only  It is not intended as medical advice for individual conditions or treatments   Talk to your doctor, nurse or pharmacist before following any medical regimen to see if it is safe and effective for you  © 2014 [address] is for End User's use only and may not be sold, redistributed or otherwise used for commercial purposes  All illustrations and images included in CareNotes® are the copyrighted property of A D A M , Inc  or Ruben Talbot

## 2018-07-26 NOTE — ED NOTES
Patient transported to [address] [place] [address] , [facility]  07/25/18 8740

## 2018-08-07 DIAGNOSIS — G20 PARKINSON'S DISEASE (HCC): Primary | ICD-10-CM

## 2018-08-14 ENCOUNTER — TELEPHONE (OUTPATIENT)
Dept: INTERNAL MEDICINE CLINIC | Facility: CLINIC | Age: 73
End: 2018-08-14

## 2018-08-14 ENCOUNTER — OFFICE VISIT (OUTPATIENT)
Dept: INTERNAL MEDICINE CLINIC | Facility: CLINIC | Age: 73
End: 2018-08-14
Payer: COMMERCIAL

## 2018-08-14 VITALS
OXYGEN SATURATION: 96 % | SYSTOLIC BLOOD PRESSURE: 94 MMHG | BODY MASS INDEX: 24.47 KG/M2 | HEART RATE: 80 BPM | WEIGHT: 129.6 LBS | DIASTOLIC BLOOD PRESSURE: 70 MMHG | HEIGHT: 61 IN

## 2018-08-14 DIAGNOSIS — M81.0 SENILE OSTEOPOROSIS: ICD-10-CM

## 2018-08-14 DIAGNOSIS — N39.46 MIXED STRESS AND URGE URINARY INCONTINENCE: ICD-10-CM

## 2018-08-14 DIAGNOSIS — E55.9 VITAMIN D DEFICIENCY: ICD-10-CM

## 2018-08-14 DIAGNOSIS — G20 PARKINSON'S DISEASE (HCC): ICD-10-CM

## 2018-08-14 DIAGNOSIS — E78.2 MIXED HYPERLIPIDEMIA: ICD-10-CM

## 2018-08-14 DIAGNOSIS — R10.30 LOWER ABDOMINAL PAIN: Primary | ICD-10-CM

## 2018-08-14 DIAGNOSIS — E03.9 ACQUIRED HYPOTHYROIDISM: ICD-10-CM

## 2018-08-14 PROBLEM — M70.60 TROCHANTERIC BURSITIS: Status: ACTIVE | Noted: 2018-08-14

## 2018-08-14 PROBLEM — Z78.9 PATIENT REFUSES TO DISCLOSE ADVANCE DIRECTIVE INFORMATION: Status: ACTIVE | Noted: 2018-05-23

## 2018-08-14 PROCEDURE — 99204 OFFICE O/P NEW MOD 45 MIN: CPT | Performed by: INTERNAL MEDICINE

## 2018-08-14 PROCEDURE — 3008F BODY MASS INDEX DOCD: CPT | Performed by: INTERNAL MEDICINE

## 2018-08-14 PROCEDURE — 3725F SCREEN DEPRESSION PERFORMED: CPT | Performed by: INTERNAL MEDICINE

## 2018-08-14 PROCEDURE — 1101F PT FALLS ASSESS-DOCD LE1/YR: CPT | Performed by: INTERNAL MEDICINE

## 2018-08-14 RX ORDER — DICYCLOMINE HYDROCHLORIDE 10 MG/1
CAPSULE ORAL AS NEEDED
COMMUNITY
Start: 2018-07-19 | End: 2018-11-27 | Stop reason: ALTCHOICE

## 2018-08-14 RX ORDER — ALENDRONATE SODIUM 70 MG/1
TABLET ORAL
COMMUNITY
Start: 2018-02-06 | End: 2018-08-14

## 2018-08-14 RX ORDER — NITROFURANTOIN 25; 75 MG/1; MG/1
CAPSULE ORAL
COMMUNITY
Start: 2018-08-11 | End: 2018-08-18

## 2018-08-14 NOTE — TELEPHONE ENCOUNTER
PT HAD A QUESTION REGARDING THE GASTRO DR SHE SAW DR MCKEON A FEW YEARS AGO WANTS TO KNOW IF SHE CAN SEE HIM OR WOULD YOU PERFER HER TO SEE DR MILLER? SHE HAS AN APPOINTMENT WITH DR DE SOUZA FOR GASTRO AS WELL ON 8/29 WHICH DR SHOULD SHE SEE  Sherre Soulier  PLEASE ADVISE PT

## 2018-08-14 NOTE — PROGRESS NOTES
The the the the the the the the the the [facility]  of [facility]AV[facility]AL MEDICINE AT [address], [address] [place] [address]  Tel: [phone]      NAME: Shawn Alfonso  AGE: 68 y o  SEX: female  : 1945   MRN: [id number]    DATE: 2018  TIME: 9:49 AM      Assessment and Plan:  1  Lower abdominal pain  Has been having the pain for a few months now  The CT scan of the abdomen and pelvis was done which was within normal limits  She was told to see GI for further evaluation of the pain  - Ambulatory referral to Gastroenterology; Future    2  Mixed hyperlipidemia  Has not been taking any medication, follow-up lipid profile  - Ambulatory referral to Gastroenterology; Future  - CBC and differential; Future  - Comprehensive metabolic panel; Future  - Lipid panel; Future    3  Acquired hypothyroidism  Continue levothyroxine  - TSH, 3rd generation; Future    4  Parkinson's disease ([facility] [address] )  Continue medications for Parkinson's  Follow up with Neurology  5  Mixed stress and urge urinary incontinence  Stable, does not take any medication    6  Senile osteoporosis  Has been getting infusions of Reclast as she could not tolerate the Fosamax    7  Vitamin D deficiency  Continue vitamin-D supplement  - Vitamin D 25 hydroxy; Future      - Counseling Documentation: patient was counseled regarding: instructions for management, risk factor reductions, prognosis, patient and family education, impressions, risks and benefits of treatment options and importance of compliance with treatment  - Medication Side Effects: Adverse side effects of medications were reviewed with the patient/guardian today  Return for follow up visit in 2 months or earlier, if needed  Chief Complaint:  Chief Complaint   Patient presents with   [facility]    GI Problem         History of Present Illness: This is a new patient was here to establish    Lower abdominal pain-for the last 2 months patient has been having pain in the lower abdomen which is sharp and stays for couple of hours when it comes  It is not related to food  In the past she was told that it is related to constipation and was given stool softeners and MiraLax  She does not think that it is helping her  In fact she is having loose stools with the MiraLax  Hyperlipidemia-she is not taking any medication for it  Hypothyroidism-takes levothyroxine regularly  Parkinson's disease-she is on multiple medications and is stable on them  She follows up with Neurology  Urinary incontinence-does not take medication  Osteoporosis-she was taking Fosamax in the past but it caused a lot of nausea and she had to discontinue it  Now she is taking Reclast infusions  Vitamin-D deficiency-she takes vitamin-D regularly          Active Problem List:  Patient Active Problem List   Diagnosis    Parkinson's disease ([facility] [address] )    Scoliosis    Low back pain    Senile osteoporosis    Acquired hypothyroidism    Patient refuses to disclose advance directive information    Elevated antinuclear antibody (RAS) level    Family hx-breast malignancy    Mixed hyperlipidemia    Rosacea    Trochanteric bursitis    Urinary incontinence    Vitamin D deficiency         Past Medical History:  Past Medical History:   Diagnosis Date    Disease of thyroid gland     Generalized anxiety disorder 4/23/2015    Osteoporosis     Parkinson disease ([facility] [address] )     Rosacea          Past Surgical History:  Past Surgical History:   Procedure Laterality Date    FOOT SURGERY Bilateral     HYSTERECTOMY           Family History:  Family History   Problem Relation Age of Onset    Lung cancer Mother     Lung cancer Father     Esophageal cancer Brother          Social History:  Social History     Social History    Marital status: Significant Other     Spouse name: N/A    Number of children: N/A    Years of education: N/A     Social History Main Topics    Smoking status: Never Smoker    Smokeless tobacco: Never Used    Alcohol use No    Drug use: No    Sexual activity: No     Other Topics Concern    None     Social History Narrative    None         Allergies:   Allergies   Allergen Reactions    Dust Mite Extract     Pollen Extract     Simvastatin Other (See Comments)     Jaundice    Statins Other (See Comments)         Medications:    Current Outpatient Prescriptions:     aspirin (ECOTRIN LOW STRENGTH) 81 mg EC tablet, Take 81 mg by mouth daily, Disp: , Rfl:     carbidopa-levodopa (SINEMET)  mg per tablet, Take 1 5 tabs QID, Disp: 180 tablet, Rfl: 5    Cholecalciferol (VITAMIN D) 2000 units tablet, 1 daily, Disp: , Rfl:     Cranberry 1000 MG CAPS, Take 4,200 mg by mouth, Disp: , Rfl:     Homeopathic Products (PROSACEA) GEL, Apply topically, Disp: , Rfl:     levothyroxine 50 mcg tablet, Take 50 mcg by mouth, Disp: , Rfl:     Multiple Vitamin (MULTIVITAMINS PO), daily, Disp: , Rfl:     nitrofurantoin (MACROBID) 100 mg capsule, Take by mouth, Disp: , Rfl:     rasagiline (AZILECT) 1 MG, Take 1 tablet (1 mg total) by mouth daily, Disp: 30 each, Rfl: 3    rotigotine (NEUPRO) 4 MG/24HR, Place 1 patch on the skin daily, Disp: 30 patch, Rfl: 5    dicyclomine (BENTYL) 10 mg capsule, Take by mouth, Disp: , Rfl:     docusate sodium (COLACE) 100 mg capsule, Take 1 capsule (100 mg total) by mouth 2 (two) times a day for 30 days (Patient not taking: Reported on 8/14/2018 ), Disp: 60 capsule, Rfl: 0    polyethylene glycol (MIRALAX) 17 g packet, Take 17 g by mouth daily for 7 days (Patient not taking: Reported on 8/14/2018 ), Disp: 14 each, Rfl: 0      The following portions of the patient's history were reviewed and updated as appropriate: past medical history, past surgical history, family history, social history, allergies, current medications and active problem list       Review of Systems:  Constitutional: Denies fever, chills, weight gain, weight loss, fatigue  Eyes: Denies eye redness, eye discharge, double vision, change in visual acuity  ENT: Denies hearing loss, tinnitus, sneezing, nasal congestion, nasal discharge, sore throat   Respiratory: Denies cough, expectoration, hemoptysis, shortness of breath, wheezing  Cardiovascular: Denies chest pain, palpitations, lower extremity swelling, orthopnea, PND  Gastrointestinal: has abdominal pain, denies heartburn, nausea, vomiting, hematemesis, diarrhea, bloody stools  Genito-Urinary: Denies dysuria, frequency, difficulty in micturition, nocturia, incontinence  Musculoskeletal: Denies back pain, joint pain, muscle pain  Neurologic: Denies confusion, lightheadedness, syncope, headache, focal weakness, sensory changes, seizures  Endocrine: Denies polyuria, polydipsia, temperature intolerance  Allergy and Immunology: Denies hives, insect bite sensitivity  Hematological and Lymphatic: Denies bleeding problems, swollen glands   Psychological: Denies depression, suicidal ideation, anxiety, panic, mood swings  Dermatological: Denies pruritus, rash, skin lesion changes      Vitals:  Vitals:    08/14/18 0916   BP: 94/70   Pulse: 80   SpO2: 96%       Body mass index is 24 49 kg/m²  Weight (last 2 days)     Date/Time   Weight    08/14/18 0916  58 8 (129 6)                Physical Examination:  General: Patient is not in acute distress  Awake, alert, responding to commands  No weight gain or loss  Head: Normocephalic  Atraumatic  Eyes: Conjunctiva and lids with no swelling, erythema or discharge  Both pupils normal sized, round and reactive to light  Sclera nonicteric  ENT: External examination of nose and ear normal  Otoscopic examination shows translucent tympanic membranes with patent canals without erythema  Oropharynx moist with no erythema, edema, exudate or lesions  Neck: Supple  JVP not raised  Trachea midline  No masses  No thyromegaly  Lungs: No signs of increased work of breathing or respiratory distress   Bilateral bronchovascular breath sounds with no crackles or rhonchi  Chest wall: No tenderness  Cardiovascular: Normal PMI  No thrills  Regular rate and rhythm  S1 and S2 normal  No murmur, rub or gallop  Gastrointestinal: Abdomen soft, lower abdomen is tender  No guarding or rigidity  Liver and spleen not palpable  Bowel sounds present  Neurologic: Cranial nerves II-XII intact   Cortical functions normal  Motor system - Reflexes 2+ and symmetrical  Sensations normal  Musculoskeletal: Gait normal  No joint tenderness  Integumentary: Skin normal with no rash or lesions  Lymphatic: No palpable lymph nodes in neck, axilla or groin  Extremities: No clubbing, cyanosis, edema or varicosities  Psychological: Judgement and insight normal  Mood and affect normal      Laboratory Results:  CBC with diff:   Lab Results   Component Value Date    WBC 7 93 07/25/2018    RBC 4 55 07/25/2018    HGB 13 4 07/25/2018    HCT 40 7 07/25/2018    MCV 90 07/25/2018    MCH 29 5 07/25/2018    RDW 13 6 07/25/2018     07/25/2018       CMP:  Lab Results   Component Value Date    CREATININE 0 91 07/25/2018    BUN 21 07/25/2018     07/25/2018    K 3 9 07/25/2018     07/25/2018    CO2 26 07/25/2018    GLUCOSE 106 07/25/2018    PROT 6 7 07/25/2018    ALKPHOS 60 07/25/2018    ALT 8 (L) 07/25/2018    AST 14 07/25/2018       Lab Results   Component Value Date    MG 2 4 07/25/2018       No results found for: TROPONINI, CKMB, CKTOTAL    Lipid Profile:   No results found for: CHOL  No results found for: HDL  No results found for: LDLCALC  No results found for: TRIG      Health Maintenance:  Health Maintenance   Topic Date Due    Hepatitis C Screening  1945    MAMMOGRAM  1945    CRC Screening: Colonoscopy  1945    PNEUMOCOCCAL POLYSACCHARIDE VACCINE AGE 65 AND OVER  02/23/2011    GLAUCOMA SCREENING 65 + YR  02/23/2013    INFLUENZA VACCINE  09/01/2018    Fall Risk  08/14/2019    Depression Screening PHQ-9  08/14/2019    Urinary Incontinence Screening  08/14/2019    DTaP,Tdap,and Td Vaccines (2 - Td) 10/25/2022       There is no immunization history on file for this patient        Flora Jean MD  9/24/3298,6:54 AM

## 2018-08-16 ENCOUNTER — HOSPITAL ENCOUNTER (EMERGENCY)
Facility: HOSPITAL | Age: 73
Discharge: HOME | End: 2018-08-16
Admitting: EMERGENCY MEDICINE
Payer: COMMERCIAL

## 2018-08-16 VITALS
WEIGHT: 127.87 LBS | OXYGEN SATURATION: 99 % | TEMPERATURE: 98.1 F | HEART RATE: 90 BPM | DIASTOLIC BLOOD PRESSURE: 68 MMHG | BODY MASS INDEX: 24.16 KG/M2 | SYSTOLIC BLOOD PRESSURE: 113 MMHG | RESPIRATION RATE: 18 BRPM

## 2018-08-16 LAB
BILIRUB UR QL STRIP: NEGATIVE
CLARITY UR: ABNORMAL
COLOR UR: YELLOW
GLUCOSE UR STRIP-MCNC: NEGATIVE MG/DL
HGB UR QL STRIP.AUTO: NEGATIVE
KETONES UR STRIP-MCNC: ABNORMAL MG/DL
LEUKOCYTE ESTERASE UR QL STRIP: NEGATIVE
NITRITE UR QL STRIP: NEGATIVE
PH UR STRIP.AUTO: 5.5 [PH] (ref 4.5–8)
PROT UR STRIP-MCNC: NEGATIVE MG/DL
SP GR UR STRIP.AUTO: <=1.005 (ref 1–1.03)
UROBILINOGEN UR QL STRIP.AUTO: 0.2 E.U./DL

## 2018-08-16 PROCEDURE — 81003 URINALYSIS AUTO W/O SCOPE: CPT

## 2018-08-17 LAB
ATRIAL RATE: 84 BPM
P AXIS: 65 DEGREES
PR INTERVAL: 178 MS
QRS AXIS: 8 DEGREES
QRSD INTERVAL: 94 MS
QT INTERVAL: 386 MS
QTC INTERVAL: 456 MS
T WAVE AXIS: 59 DEGREES
VENTRICULAR RATE: 84 BPM

## 2018-08-17 PROCEDURE — 93010 ELECTROCARDIOGRAM REPORT: CPT | Performed by: INTERNAL MEDICINE

## 2018-08-20 ENCOUNTER — TELEPHONE (OUTPATIENT)
Dept: INTERNAL MEDICINE CLINIC | Facility: CLINIC | Age: 73
End: 2018-08-20

## 2018-08-20 NOTE — TELEPHONE ENCOUNTER
Patient was in last week still not feeling well, very bad stomach/abd pain  Nba Chiang wants to talk with Dr Kya Meade

## 2018-08-24 RX ORDER — HYDROXYZINE HYDROCHLORIDE 10 MG/1
TABLET, FILM COATED ORAL
COMMUNITY
Start: 2018-08-24 | End: 2018-11-27 | Stop reason: ALTCHOICE

## 2018-08-28 ENCOUNTER — TELEPHONE (OUTPATIENT)
Dept: GASTROENTEROLOGY | Facility: CLINIC | Age: 73
End: 2018-08-28

## 2018-08-28 DIAGNOSIS — R19.7 DIARRHEA, UNSPECIFIED TYPE: Primary | ICD-10-CM

## 2018-08-28 NOTE — TELEPHONE ENCOUNTER
Pt called stating for 6-7 weeks she's been having abdominal pain; consistent diarrhea and nausea  She was instructed by pcp to take colace,miralax and bentyl    But made her constipated; within last three days she's had very lose soft stool;  Feeling very weak    She has a appt with Dr Wood Letters tomorrow she's just concerned

## 2018-08-28 NOTE — TELEPHONE ENCOUNTER
BETI: Spoke with patient  She has a appointment tomorrow but she has been feeling weak for 6-7 weeks d/t diarrhea  She was in the ED CT scan was normal  Advised patient to start BRAT diet, encouraged fluids Gatorade, pedialyte and we will see her tomorrow  She understand if at any point she is excessively weak she needs to go to ED

## 2018-08-29 ENCOUNTER — APPOINTMENT (OUTPATIENT)
Dept: LAB | Facility: HOSPITAL | Age: 73
End: 2018-08-29
Attending: INTERNAL MEDICINE
Payer: COMMERCIAL

## 2018-08-29 ENCOUNTER — OFFICE VISIT (OUTPATIENT)
Dept: GASTROENTEROLOGY | Facility: CLINIC | Age: 73
End: 2018-08-29
Payer: COMMERCIAL

## 2018-08-29 VITALS
BODY MASS INDEX: 24.96 KG/M2 | DIASTOLIC BLOOD PRESSURE: 78 MMHG | WEIGHT: 132.13 LBS | HEART RATE: 87 BPM | SYSTOLIC BLOOD PRESSURE: 124 MMHG

## 2018-08-29 DIAGNOSIS — R19.7 DIARRHEA, UNSPECIFIED TYPE: ICD-10-CM

## 2018-08-29 DIAGNOSIS — Z12.11 SCREENING FOR COLON CANCER: Primary | ICD-10-CM

## 2018-08-29 DIAGNOSIS — E78.2 MIXED HYPERLIPIDEMIA: ICD-10-CM

## 2018-08-29 DIAGNOSIS — K59.1 FUNCTIONAL DIARRHEA: ICD-10-CM

## 2018-08-29 DIAGNOSIS — R10.30 LOWER ABDOMINAL PAIN: ICD-10-CM

## 2018-08-29 PROBLEM — R10.9 ABDOMINAL PAIN: Status: ACTIVE | Noted: 2018-08-29

## 2018-08-29 PROCEDURE — 87493 C DIFF AMPLIFIED PROBE: CPT

## 2018-08-29 PROCEDURE — 99204 OFFICE O/P NEW MOD 45 MIN: CPT | Performed by: INTERNAL MEDICINE

## 2018-08-29 RX ORDER — POLYETHYLENE GLYCOL 3350 17 G/17G
17 POWDER, FOR SOLUTION ORAL AS NEEDED
COMMUNITY
End: 2021-04-27

## 2018-08-29 NOTE — PROGRESS NOTES
Consultation - [facility]) [facility]  Jacey Church 1945 68 y o  female     ASSESSMENT @ PLAN:   She is a 71-year-old female with periumbilical abdominal pain diarrhea change in bowel habits constipation and rectal pain with associated stress for 8 weeks  She had a normal colonoscopy 8 and half years ago    1 will do endoscopy and colonoscopy to investigate    2 will await stool C diff testing; she is turning this in today    3 continue dicyclomine for relief of abdominal pain    4 she is aware that the stress of her adult daughter moving back in with her may be causing some of her symptoms    Chief Complaint:   Abdominal pain and diarrhea and change in bowel habits    HPI:   She is a 71-year-old female with 8 weeks of abdominal symptoms  She originally started with diarrhea and then she had change in bowel habits and now has episodes of diarrhea and constipation  She has bilateral lower abdominal cramping  This is being helped by dicyclomine that was given to her by her primary care physician  The abdominal cramping is relieved with a bowel movement  She also reports rectal pain  She has no melena or hematochezia  She has had severe stress  She has had her adult daughter moved back in with her  She received an antibiotic course for UTI Macrodantin a few months ago  She has no travels  She does drink from a well  She has been on her Parkinson's medications and they have not cut stomach problems in the past   She has not lost weight  She has a stool C diff that we recommended and she is going to turn it in later  REVIEW OF SYSTEMS:    CONSTITUTIONAL: Denies any fever, chills, or rigors  Good appetite, and no recent weight loss  HEENT: No earache or tinnitus  Denies hearing loss or visual disturbances  CARDIOVASCULAR: No chest pain or palpitations  RESPIRATORY: Denies any cough, hemoptysis, shortness of breath or dyspnea on exertion    GASTROINTESTINAL: As noted in the History of Present Illness  GENITOURINARY: No problems with urination  Denies any hematuria or dysuria  NEUROLOGIC: No dizziness or vertigo, denies headaches  MUSCULOSKELETAL: Denies any muscle or joint pain  SKIN: Denies skin rashes or itching  ENDOCRINE: Denies excessive thirst  Denies intolerance to heat or cold  PSYCHOSOCIAL: Denies depression or anxiety  Denies any recent memory loss  Past Medical History:   Diagnosis Date    Disease of thyroid gland     Generalized anxiety disorder 4/23/2015    Hepatic disease     Osteoporosis     Parkinson disease ([address] )     Rosacea       Past Surgical History:   Procedure Laterality Date    FOOT SURGERY Bilateral     HYSTERECTOMY      URETHRA SURGERY      laparoscopic urethral suspension for stress incontinence     Social History     Social History    Marital status: Significant Other     Spouse name: N/A    Number of children: N/A    Years of education: Master's Degree     Occupational History          Retired     Social History Main Topics    Smoking status: Never Smoker    Smokeless tobacco: Never Used    Alcohol use No      Comment: Alcohol use per [employer]    Drug use: No    Sexual activity: No     Other Topics Concern    Not on file     Social History Narrative    Denied:  History of caffeine use         Family History   Problem Relation Age of Onset    Lung cancer Mother     Dementia Mother     Cancer Mother     Lung cancer Father     Cancer Father     Esophageal cancer Brother     Cancer Brother     Cavazos's esophagus Brother     Esophageal cancer Family     Heart disease Family     Stroke Family         syndrome     Simvastatin; Dust mite extract;  Pollen extract; and Statins  Current Outpatient Prescriptions   Medication Sig Dispense Refill    carbidopa-levodopa (SINEMET)  mg per tablet Take 1 5 tabs  tablet 5    Cholecalciferol (VITAMIN D) 2000 units tablet 1 daily      Cranberry 1000 MG CAPS Take 4,200 mg by mouth  dicyclomine (BENTYL) 10 mg capsule Take by mouth as needed        docusate sodium (COLACE) 100 mg capsule Take 1 capsule (100 mg total) by mouth 2 (two) times a day for 30 days 60 capsule 0    Homeopathic Products (PROSACEA) GEL Apply topically      hydrOXYzine HCL (ATARAX) 10 mg tablet Take by mouth      levothyroxine 50 mcg tablet Take 50 mcg by mouth      Multiple Vitamin (MULTIVITAMINS PO) daily      polyethylene glycol (MIRALAX) 17 g packet Take 17 g by mouth daily      rasagiline (AZILECT) 1 MG Take 1 tablet (1 mg total) by mouth daily 30 each 3    rotigotine (NEUPRO) 4 MG/24HR Place 1 patch on the skin daily 30 patch 5    sertraline (ZOLOFT) 50 mg tablet Take by mouth      aspirin (ECOTRIN LOW STRENGTH) 81 mg EC tablet Take 81 mg by mouth daily       No current facility-administered medications for this visit  Blood pressure 124/78, pulse 87, weight 59 9 kg (132 lb 2 oz)  PHYSICAL EXAM:     General Appearance:   Alert, cooperative, no distress, appears stated age    HEENT:   Normocephalic, atraumatic, anicteric      Neck:  Supple, symmetrical, trachea midline, no adenopathy;    thyroid: no enlargement/tenderness/nodules; no carotid  bruit or JVD    Lungs:   Clear to auscultation bilaterally; no rales, rhonchi or wheezing; respirations unlabored    Heart[de-identified]   S1 and S2 normal; regular rate and rhythm; no murmur, rub, or gallop     Abdomen:   Soft, non-tender, non-distended; normal bowel sounds; no masses, no organomegaly    Genitalia:   Deferred    Rectal:   Deferred    Extremities:  No cyanosis, clubbing or edema    Pulses:  2+ and symmetric all extremities    Skin:  Skin color, texture, turgor normal, no rashes or lesions    Lymph nodes:  No palpable cervical, axillary or inguinal lymphadenopathy        Lab Results   Component Value Date    WBC 7 93 07/25/2018    HGB 13 4 07/25/2018    HCT 40 7 07/25/2018    MCV 90 07/25/2018     07/25/2018     Lab Results   Component Value Date    CALCIUM 8 7 07/25/2018     07/25/2018    K 3 9 07/25/2018    CO2 26 07/25/2018     07/25/2018    BUN 21 07/25/2018    CREATININE 0 91 07/25/2018     Lab Results   Component Value Date    ALT 8 (L) 07/25/2018    AST 14 07/25/2018    ALKPHOS 60 07/25/2018     Lab Results   Component Value Date    INR 1 04 01/06/2018    PROTIME 13 8 01/06/2018

## 2018-08-29 NOTE — LETTER
August 29, 2018     Flora Jean MD  [address] [place] [id number]    Patient: Sanchez Nunez   YOB: 1945   Date of Visit: 8/29/2018       Dear   [address]:    Thank you for referring Prem Peter to me for evaluation  Below are my notes for this consultation  If you have questions, please do not hesitate to call me  I look forward to following your patient along with you  Sincerely,        Quiana Britt MD        CC: DO Quiana Linton MD  8/29/2018  4:28 PM  Sign at close encounter  Consultation - [facility]  Sanchez Nunez 1945 68 y o  female     ASSESSMENT @ PLAN:   She is a 71-year-old female with periumbilical abdominal pain diarrhea change in bowel habits constipation and rectal pain with associated stress for 8 weeks  She had a normal colonoscopy 8 and half years ago    1 will do endoscopy and colonoscopy to investigate    2 will await stool C diff testing; she is turning this in today    3 continue dicyclomine for relief of abdominal pain    4 she is aware that the stress of her adult daughter moving back in with her may be causing some of her symptoms    Chief Complaint:   Abdominal pain and diarrhea and change in bowel habits    HPI:   She is a 71-year-old female with 8 weeks of abdominal symptoms  She originally started with diarrhea and then she had change in bowel habits and now has episodes of diarrhea and constipation  She has bilateral lower abdominal cramping  This is being helped by dicyclomine that was given to her by her primary care physician  The abdominal cramping is relieved with a bowel movement  She also reports rectal pain  She has no melena or hematochezia  She has had severe stress  She has had her adult daughter moved back in with her  She received an antibiotic course for UTI Macrodantin a few months ago  She has no travels  She does drink from a well    She has been on her Parkinson's medications and they have not cut stomach problems in the past   She has not lost weight  She has a stool C diff that we recommended and she is going to turn it in later  REVIEW OF SYSTEMS:    CONSTITUTIONAL: Denies any fever, chills, or rigors  Good appetite, and no recent weight loss  HEENT: No earache or tinnitus  Denies hearing loss or visual disturbances  CARDIOVASCULAR: No chest pain or palpitations  RESPIRATORY: Denies any cough, hemoptysis, shortness of breath or dyspnea on exertion  GASTROINTESTINAL: As noted in the History of Present Illness  GENITOURINARY: No problems with urination  Denies any hematuria or dysuria  NEUROLOGIC: No dizziness or vertigo, denies headaches  MUSCULOSKELETAL: Denies any muscle or joint pain  SKIN: Denies skin rashes or itching  ENDOCRINE: Denies excessive thirst  Denies intolerance to heat or cold  PSYCHOSOCIAL: Denies depression or anxiety  Denies any recent memory loss       Past Medical History:   Diagnosis Date    Disease of thyroid gland     Generalized anxiety disorder 4/23/2015    Hepatic disease     Osteoporosis     Parkinson disease ([facility] [address] )     Rosacea       Past Surgical History:   Procedure Laterality Date    FOOT SURGERY Bilateral     HYSTERECTOMY      URETHRA SURGERY      laparoscopic urethral suspension for stress incontinence     Social History     Social History    Marital status: Significant Other     Spouse name: N/A    Number of children: N/A    Years of education: Master's Degree     Occupational History          Retired     Social History Main Topics    Smoking status: Never Smoker    Smokeless tobacco: Never Used    Alcohol use No      Comment: Alcohol use per [employer]    Drug use: No    Sexual activity: No     Other Topics Concern    Not on file     Social History Narrative    Denied:  History of caffeine use         Family History   Problem Relation Age of Onset    Lung cancer Mother     Dementia Mother  Cancer Mother     Lung cancer Father     Cancer Father     Esophageal cancer Brother     Cancer Brother     Cavazos's esophagus Brother     Esophageal cancer Family     Heart disease Family     Stroke Family         syndrome     Simvastatin; Dust mite extract; Pollen extract; and Statins  Current Outpatient Prescriptions   Medication Sig Dispense Refill    carbidopa-levodopa (SINEMET)  mg per tablet Take 1 5 tabs  tablet 5    Cholecalciferol (VITAMIN D) 2000 units tablet 1 daily      Cranberry 1000 MG CAPS Take 4,200 mg by mouth      dicyclomine (BENTYL) 10 mg capsule Take by mouth as needed        docusate sodium (COLACE) 100 mg capsule Take 1 capsule (100 mg total) by mouth 2 (two) times a day for 30 days 60 capsule 0    Homeopathic Products (PROSACEA) GEL Apply topically      hydrOXYzine HCL (ATARAX) 10 mg tablet Take by mouth      levothyroxine 50 mcg tablet Take 50 mcg by mouth      Multiple Vitamin (MULTIVITAMINS PO) daily      polyethylene glycol (MIRALAX) 17 g packet Take 17 g by mouth daily      rasagiline (AZILECT) 1 MG Take 1 tablet (1 mg total) by mouth daily 30 each 3    rotigotine (NEUPRO) 4 MG/24HR Place 1 patch on the skin daily 30 patch 5    sertraline (ZOLOFT) 50 mg tablet Take by mouth      aspirin (ECOTRIN LOW STRENGTH) 81 mg EC tablet Take 81 mg by mouth daily       No current facility-administered medications for this visit  Blood pressure 124/78, pulse 87, weight 59 9 kg (132 lb 2 oz)  PHYSICAL EXAM:     General Appearance:   Alert, cooperative, no distress, appears stated age    HEENT:   Normocephalic, atraumatic, anicteric      Neck:  Supple, symmetrical, trachea midline, no adenopathy;    thyroid: no enlargement/tenderness/nodules; no carotid  bruit or JVD    Lungs:   Clear to auscultation bilaterally; no rales, rhonchi or wheezing; respirations unlabored    Heart[de-identified]   S1 and S2 normal; regular rate and rhythm; no murmur, rub, or gallop  Abdomen:   Soft, non-tender, non-distended; normal bowel sounds; no masses, no organomegaly    Genitalia:   Deferred    Rectal:   Deferred    Extremities:  No cyanosis, clubbing or edema    Pulses:  2+ and symmetric all extremities    Skin:  Skin color, texture, turgor normal, no rashes or lesions    Lymph nodes:  No palpable cervical, axillary or inguinal lymphadenopathy        Lab Results   Component Value Date    WBC 7 93 07/25/2018    HGB 13 4 07/25/2018    HCT 40 7 07/25/2018    MCV 90 07/25/2018     07/25/2018     Lab Results   Component Value Date    CALCIUM 8 7 07/25/2018     07/25/2018    K 3 9 07/25/2018    CO2 26 07/25/2018     07/25/2018    BUN 21 07/25/2018    CREATININE 0 91 07/25/2018     Lab Results   Component Value Date    ALT 8 (L) 07/25/2018    AST 14 07/25/2018    ALKPHOS 60 07/25/2018     Lab Results   Component Value Date    INR 1 04 01/06/2018    PROTIME 13 8 01/06/2018

## 2018-08-30 LAB — C DIFF TOX GENS STL QL NAA+PROBE: NORMAL

## 2018-09-04 ENCOUNTER — ANESTHESIA EVENT (OUTPATIENT)
Dept: PERIOP | Facility: HOSPITAL | Age: 73
End: 2018-09-04
Payer: COMMERCIAL

## 2018-09-04 RX ORDER — SODIUM CHLORIDE, SODIUM LACTATE, POTASSIUM CHLORIDE, CALCIUM CHLORIDE 600; 310; 30; 20 MG/100ML; MG/100ML; MG/100ML; MG/100ML
20 INJECTION, SOLUTION INTRAVENOUS CONTINUOUS
Status: CANCELLED | OUTPATIENT
Start: 2018-09-04

## 2018-09-04 NOTE — ANESTHESIA PREPROCEDURE EVALUATION
Review of Systems/Medical History  Patient summary reviewed  Chart reviewed      Cardiovascular  Hyperlipidemia,    Pulmonary  Negative pulmonary ROS        GI/Hepatic    Bowel prep  Comment: abdominal pain, diarrhea, change in bowel habits, constipation, and rectal pain      Negative  ROS        Endo/Other  History of thyroid disease , hypothyroidism,      GYN       Hematology  Negative hematology ROS      Musculoskeletal  Scoliosis ,        Neurology      Comment: Parkinsons Psychology   Anxiety,                   Anesthesia Plan  ASA Score- 2     Anesthesia Type- IV sedation with anesthesia with ASA Monitors  Additional Monitors:   Airway Plan:         Plan Factors-    Induction- intravenous  Postoperative Plan-     Informed Consent- Anesthetic plan and risks discussed with patient              Review of Systems/Medical History  Patient summary reviewed  Chart reviewed  No history of anesthetic complications     Cardiovascular  Exercise tolerance (METS): >4,  Hyperlipidemia,    Pulmonary  Negative pulmonary ROS No shortness of breath,        GI/Hepatic    Liver disease , Hepatitis Chronic, Bowel prep  Comment:   Pt with hepatitis from age 24, states it's not C, but unsure of type     Negative  ROS        Endo/Other  History of thyroid disease , hypothyroidism,      GYN       Hematology  Negative hematology ROS      Musculoskeletal  Scoliosis ,        Neurology      Comment: Parkinsons Psychology   Anxiety,              Physical Exam    Airway    Mallampati score: II  TM Distance: >3 FB  Neck ROM: full     Dental   Comment: Multiple caps/crowns,     Cardiovascular  Cardiovascular exam normal    Pulmonary  Pulmonary exam normal     Other Findings      Lab Results   Component Value Date    WBC 7 93 07/25/2018    HGB 13 4 07/25/2018     07/25/2018     Lab Results   Component Value Date     07/25/2018    K 3 9 07/25/2018    BUN 21 07/25/2018    CREATININE 0 91 07/25/2018     Lab Results Component Value Date    PTT 28 01/06/2018      Lab Results   Component Value Date    INR 1 04 01/06/2018       No results found for: HGBA1C    Anesthesia Plan  ASA Score- 3     Anesthesia Type- IV sedation with anesthesia with ASA Monitors  Additional Monitors:   Airway Plan:     Comment: I, Dr Asif Solomon, the attending physician, has personally seen and evaluated the patient prior to anesthetic care  I have reviewed the pre-anesthetic record, and other medical records if appropriate to the anesthetic care  Risks and benefits discussed with patient; patient consented and agrees to proceed  If a CRNA is involved in the case, I have reviewed the CRNA assessment, if present, and agree  The patient is in a suitable condition to proceed with my formulated anesthetic plan        Plan Factors-  Patient did not smoke on day of surgery  Induction- intravenous  Postoperative Plan-     Informed Consent- Anesthetic plan and risks discussed with patient  I personally reviewed this patient with the CRNA  Discussed and agreed on the Anesthesia Plan with the CRNA  Leeroy Trevino

## 2018-09-05 ENCOUNTER — HOSPITAL ENCOUNTER (OUTPATIENT)
Facility: HOSPITAL | Age: 73
Setting detail: OUTPATIENT SURGERY
Discharge: HOME | End: 2018-09-05
Attending: INTERNAL MEDICINE | Admitting: INTERNAL MEDICINE
Payer: COMMERCIAL

## 2018-09-05 ENCOUNTER — ANESTHESIA (OUTPATIENT)
Dept: PERIOP | Facility: HOSPITAL | Age: 73
End: 2018-09-05
Payer: COMMERCIAL

## 2018-09-05 VITALS
SYSTOLIC BLOOD PRESSURE: 113 MMHG | TEMPERATURE: 96.2 F | BODY MASS INDEX: 24.31 KG/M2 | DIASTOLIC BLOOD PRESSURE: 62 MMHG | HEIGHT: 61 IN | WEIGHT: 128.75 LBS | RESPIRATION RATE: 22 BRPM | HEART RATE: 67 BPM | OXYGEN SATURATION: 100 %

## 2018-09-05 DIAGNOSIS — E78.2 MIXED HYPERLIPIDEMIA: ICD-10-CM

## 2018-09-05 DIAGNOSIS — Z12.11 SCREENING FOR COLON CANCER: ICD-10-CM

## 2018-09-05 DIAGNOSIS — R10.30 LOWER ABDOMINAL PAIN: ICD-10-CM

## 2018-09-05 DIAGNOSIS — K59.1 FUNCTIONAL DIARRHEA: ICD-10-CM

## 2018-09-05 PROCEDURE — 88305 TISSUE EXAM BY PATHOLOGIST: CPT | Performed by: PATHOLOGY

## 2018-09-05 PROCEDURE — 43239 EGD BIOPSY SINGLE/MULTIPLE: CPT | Performed by: INTERNAL MEDICINE

## 2018-09-05 PROCEDURE — 88342 IMHCHEM/IMCYTCHM 1ST ANTB: CPT | Performed by: PATHOLOGY

## 2018-09-05 PROCEDURE — 45380 COLONOSCOPY AND BIOPSY: CPT | Performed by: INTERNAL MEDICINE

## 2018-09-05 RX ORDER — SODIUM CHLORIDE, SODIUM LACTATE, POTASSIUM CHLORIDE, CALCIUM CHLORIDE 600; 310; 30; 20 MG/100ML; MG/100ML; MG/100ML; MG/100ML
125 INJECTION, SOLUTION INTRAVENOUS CONTINUOUS
Status: DISCONTINUED | OUTPATIENT
Start: 2018-09-05 | End: 2018-09-05 | Stop reason: HOSPADM

## 2018-09-05 RX ORDER — PROPOFOL 10 MG/ML
INJECTION, EMULSION INTRAVENOUS AS NEEDED
Status: DISCONTINUED | OUTPATIENT
Start: 2018-09-05 | End: 2018-09-05 | Stop reason: SURG

## 2018-09-05 RX ORDER — SODIUM CHLORIDE, SODIUM LACTATE, POTASSIUM CHLORIDE, CALCIUM CHLORIDE 600; 310; 30; 20 MG/100ML; MG/100ML; MG/100ML; MG/100ML
125 INJECTION, SOLUTION INTRAVENOUS CONTINUOUS
Status: DISCONTINUED | OUTPATIENT
Start: 2018-09-05 | End: 2018-09-05

## 2018-09-05 RX ORDER — PANTOPRAZOLE SODIUM 40 MG/1
40 TABLET, DELAYED RELEASE ORAL DAILY
Qty: 30 TABLET | Refills: 3 | Status: SHIPPED | OUTPATIENT
Start: 2018-09-05 | End: 2018-10-17

## 2018-09-05 RX ADMIN — LIDOCAINE HYDROCHLORIDE 60 MG: 20 INJECTION, SOLUTION INTRAVENOUS at 10:06

## 2018-09-05 RX ADMIN — PROPOFOL 100 MG: 10 INJECTION, EMULSION INTRAVENOUS at 10:06

## 2018-09-05 RX ADMIN — PROPOFOL 25 MG: 10 INJECTION, EMULSION INTRAVENOUS at 10:12

## 2018-09-05 RX ADMIN — SODIUM CHLORIDE, SODIUM LACTATE, POTASSIUM CHLORIDE, AND CALCIUM CHLORIDE: .6; .31; .03; .02 INJECTION, SOLUTION INTRAVENOUS at 10:00

## 2018-09-05 RX ADMIN — PROPOFOL 25 MG: 10 INJECTION, EMULSION INTRAVENOUS at 10:16

## 2018-09-05 RX ADMIN — PROPOFOL 20 MG: 10 INJECTION, EMULSION INTRAVENOUS at 10:20

## 2018-09-05 RX ADMIN — PROPOFOL 50 MG: 10 INJECTION, EMULSION INTRAVENOUS at 10:08

## 2018-09-05 NOTE — ANESTHESIA POSTPROCEDURE EVALUATION
Post-Op Assessment Note      CV Status:  Stable    Hydration Status:  Stable    PONV Controlled:  None    Airway Patency:  Patent    Post Op Vitals Reviewed: Yes          Staff: CRNA         VSS post procedure, SV non obstructed, 100% O2 sat on RA

## 2018-09-05 NOTE — DISCHARGE INSTRUCTIONS
IMPRESSIONS:    LA grade B distal reflux esophagitis  Nonerosive gastritis     RECOMMENDATIONS:   Await pathology  PPI course  Reflux precautions    Upper Endoscopy   WHAT YOU NEED TO KNOW:   An upper endoscopy is also called an upper gastrointestinal (GI) endoscopy, or an esophagogastroduodenoscopy (EGD)  You may feel bloated, gassy, or have some abdominal discomfort after your procedure  Your throat may be sore for 24 to 36 hours  You may burp or pass gas from air that is still inside your body  DISCHARGE INSTRUCTIONS:   Call 911 for any of the following:   · You have sudden chest pain or trouble breathing  Seek care immediately if:   · You feel dizzy or faint  · You have trouble swallowing  · Your bowel movements are very dark or black  · Your abdomen is hard and firm and you have severe pain  · You vomit blood  Contact your healthcare provider if:   · You feel full or bloated and cannot burp or pass gas  · You have not had a bowel movement for 3 days after your procedure  · You have neck pain  · You have a fever or chills  · You have nausea or are vomiting  · You have a rash or hives  · You have questions or concerns about your endoscopy  Relieve a sore throat:  Suck on throat lozenges or crushed ice  Gargle with a small amount of warm salt water  Mix 1 teaspoon of salt and 1 cup of warm water to make salt water  Relieve gas and discomfort from bloating:  Lie on your right side with a heating pad on your abdomen  Take short walks to help pass gas  Eat small meals until bloating is relieved  Rest after your procedure: You have been given medicine to relax you  Do not  drive or make important decisions until the day after your procedure  Return to your normal activity as directed  You can usually return to work the day after your procedure  Follow up with your healthcare provider as directed:  Write down your questions so you remember to ask them during your visits     © 2017 [employer] is for End User's use only and may not be sold, redistributed or otherwise used for commercial purposes  All illustrations and images included in CareNotes® are the copyrighted property of A D [employer] M , Inc  or [employer]  The above information is an  only  It is not intended as medical advice for individual conditions or treatments  Talk to your doctor, nurse or pharmacist before following any medical regimen to see if it is safe and effective for you  IMPRESSIONS:    Sigmoid diverticulosis     RECOMMENDATIONS:  Await pathology  Sugar free fiber supplement and probiotic daily  Dicyclomine as needed for abdominal pain  Follow-up in the office in approximately 8 weeks           Colonoscopy   WHAT YOU NEED TO KNOW:   A colonoscopy is a procedure to examine the inside of your colon (intestine) with a scope  Polyps or tissue growths may have been removed during your colonoscopy  It is normal to feel bloated and to have some abdominal discomfort  You should be passing gas  If you have hemorrhoids or you had polyps removed, you may have a small amount of bleeding  DISCHARGE INSTRUCTIONS:   Seek care immediately if:   · You have a large amount of bright red blood in your bowel movements  · Your abdomen is hard and firm and you have severe pain  · You have sudden trouble breathing  Contact your healthcare provider if:   · You develop a rash or hives  · You have a fever within 24 hours of your procedure  · You have not had a bowel movement for 3 days after your procedure  · You have questions or concerns about your condition or care  Activity:   · Do not lift, strain, or run  for 3 days after your procedure  · Rest after your procedure  You have been given medicine to relax you  Do not  drive or make important decisions until the day after your procedure  Return to your normal activity as directed      · Relieve gas and discomfort from bloating  by lying on your right side with a heating pad on your abdomen  You may need to take short walks to help the gas move out  Eat small meals until bloating is relieved  If you had polyps removed: For 7 days after your procedure:  · Do not  take aspirin  · Do not  go on long car rides  Help prevent constipation:   · Eat a variety of healthy foods  Healthy foods include fruit, vegetables, whole-grain breads, low-fat dairy products, beans, lean meat, and fish  Ask if you need to be on a special diet  Your healthcare provider may recommend that you eat high-fiber foods such as cooked beans  Fiber helps you have regular bowel movements  · Drink liquids as directed  Adults should drink between 9 and 13 eight-ounce cups of liquid every day  Ask what amount is best for you  For most people, good liquids to drink are water, juice, and milk  · Exercise as directed  Talk to your healthcare provider about the best exercise plan for you  Exercise can help prevent constipation, decrease your blood pressure and improve your health  Follow up with your healthcare provider as directed:  Write down your questions so you remember to ask them during your visits  © 2017 [employer] [employer]  Information is for End User's use only and may not be sold, redistributed or otherwise used for commercial purposes  All illustrations and images included in CareNotes® are the copyrighted property of A [employer] M [employer] Inc  or [employer]  The above information is an  only  It is not intended as medical advice for individual conditions or treatments  Talk to your doctor, nurse or pharmacist before following any medical regimen to see if it is safe and effective for you

## 2018-09-05 NOTE — OP NOTE
COLONOSCOPY    PROCEDURE: Colonoscopy/ Biopsy    INDICATIONS: Diarrhea    POST-OP DIAGNOSIS: See the impression below    SEDATION: Monitored anesthesia care, check anesthesia records    PHYSICAL EXAM:  Vitals:    09/05/18 0917   BP: 119/65   Pulse: 76   Resp: 20   Temp: 98 3 °F (36 8 °C)   SpO2: 99%     Body mass index is 24 33 kg/m²  General: NAD  Heart: S1 & S2 normal, RRR  Lungs: CTA, No rales or rhonchi  Abdomen: Soft, nontender, nondistended, good bowel sounds    CONSENT:  Informed consent was obtained for the procedure, including sedation after explaining the risks and benefits of the procedure  Risks including but not limited to bleeding, perforation, infection, aspiration were discussed in detail  Also explained about less than 100%$ sensitivity with the exam and other alternatives  PREPARATION:   EKG tracing, pulse oximetry, blood pressure were monitored throughout the procedure  Patient was identified by myself both verbally and by visual inspection of ID band  DESCRIPTION:   Patient was placed in the left lateral decubitus position and was sedated with the above medication  Digital rectal examination was performed  The colonoscope was introduced in to the anal canal and advanced up to cecum, which was identified by the appendiceal orifice and IC valve  A careful inspection was made as the colonoscope was withdrawn, including a retroflexed view of the rectum; findings and interventions are described below  Appropriate photodocumentation was obtained  The quality of the colonic preparation was excellent  FINDINGS:  Sigmoid diverticulosis was noted  The cecum ascending colon hepatic flexure transverse colon splenic flexure descending colon and rectum were normal   Multiple random biopsies were obtained to rule out microscopic colitis           IMPRESSIONS:    Sigmoid diverticulosis    RECOMMENDATIONS:  Await pathology  Sugar free fiber supplement and probiotic daily  Dicyclomine as needed for abdominal pain  Follow-up in the office in approximately 8 weeks    COMPLICATIONS:  None; patient tolerated the procedure well    DISPOSITION: PACU  CONDITION: Stable    Sudha Plascencia MD  9/5/2018,10:24 AM

## 2018-09-05 NOTE — OP NOTE
ESOPHAGOGASTRODUODENOSCOPY    PROCEDURE: EGD/ Biopsy    INDICATIONS: Abdominal pain, Epigastric    POST-OP DIAGNOSIS: See the impression below    SEDATION: Monitored anesthesia care, check anesthesia records    PHYSICAL EXAM:  Vitals:    09/05/18 0917   BP: 119/65   Pulse: 76   Resp: 20   Temp: 98 3 °F (36 8 °C)   SpO2: 99%     Body mass index is 24 33 kg/m²  General: NAD  Heart: S1 & S2 normal, RRR  Lungs: CTA, No rales or rhonchi  Abdomen: Soft, nontender, nondistended, good bowel sounds    CONSENT:  Informed consent was obtained for the procedure, including sedation after explaining the risks and benefits of the procedure  Risks including but not limited to bleeding, perforation, infection, aspiration were discussed in detail  Also explained about less than 100% sensitivity with the exam and other alternatives  PREPARATION:   EKG tracing, pulse oximetry, blood pressure were monitored throughout the procedure  Patient was identified by myself both verbally and by visual inspection of ID band  DESCRIPTION:   Patient was placed in the left lateral decubitus position and was sedated with the above medication  The gastroscope was introduced in to the oropharynx and the esophagus was intubated under direct visualization  Scope was passed down the esophagus up to 2nd part of the duodenum  A careful inspection was made as the gastroscope was withdrawn, including a retroflexed view of the stomach; findings and interventions are described below  FINDINGS:    #1  Esophagus and GEJ- there was LA grade B distal reflux esophagitis  The distal esophagus was biopsied  #2  Stomach- a mild nonerosive gastritis was noted in the antrum  The cardia body and fundus was normal  Multiple biopsies were obtained  #3  Duodenum- the bulb and 2nd portion of the duodenum were normal   Multiple biopsies were obtained           IMPRESSIONS:    LA grade B distal reflux esophagitis  Nonerosive gastritis    RECOMMENDATIONS: Await pathology  PPI course  Reflux precautions    COMPLICATIONS:  None; patient tolerated the procedure well    DISPOSITION: PACU  CONDITION: Stable    Quiana Britt MD  9/5/2018,10:11 AM

## 2018-09-06 ENCOUNTER — TELEPHONE (OUTPATIENT)
Dept: GASTROENTEROLOGY | Facility: CLINIC | Age: 73
End: 2018-09-06

## 2018-09-06 NOTE — TELEPHONE ENCOUNTER
Dr Chirag Iverson pt  Spoke to pt, she states she is currently not taking anything  I advised her to take the Miralax and see if she gains any relief with just Miralax because the Miralax is more of a stimulate laxative verses the Colace  I told her if no relief with the Miralax, she can incorporate the Colace as we'll and then if still no relief after a couple of days to cb and we will discuss a new regimen  Also advised pt we don't want to over do it and then she ends up having diarrhea  Please let me know if there's anything else you would like me to advise for pt, thank you!

## 2018-10-05 DIAGNOSIS — G20 PARKINSON'S DISEASE (HCC): ICD-10-CM

## 2018-10-05 RX ORDER — RASAGILINE 1 MG/1
1 TABLET ORAL DAILY
Qty: 30 EACH | Refills: 3 | Status: SHIPPED | OUTPATIENT
Start: 2018-10-05 | End: 2019-01-28 | Stop reason: SDUPTHER

## 2018-10-12 RX ORDER — POLYETHYLENE GLYCOL 3350 17 G/17G
POWDER, FOR SOLUTION ORAL
COMMUNITY
Start: 2018-07-19 | End: 2018-11-27 | Stop reason: ALTCHOICE

## 2018-10-16 ENCOUNTER — TELEPHONE (OUTPATIENT)
Dept: INTERNAL MEDICINE CLINIC | Facility: CLINIC | Age: 73
End: 2018-10-16

## 2018-10-17 ENCOUNTER — APPOINTMENT (OUTPATIENT)
Dept: LAB | Facility: CLINIC | Age: 73
End: 2018-10-17
Payer: COMMERCIAL

## 2018-10-17 ENCOUNTER — OFFICE VISIT (OUTPATIENT)
Dept: INTERNAL MEDICINE CLINIC | Facility: CLINIC | Age: 73
End: 2018-10-17
Payer: COMMERCIAL

## 2018-10-17 VITALS
OXYGEN SATURATION: 95 % | SYSTOLIC BLOOD PRESSURE: 118 MMHG | HEART RATE: 86 BPM | DIASTOLIC BLOOD PRESSURE: 80 MMHG | WEIGHT: 132.6 LBS | BODY MASS INDEX: 25.05 KG/M2

## 2018-10-17 DIAGNOSIS — E78.2 MIXED HYPERLIPIDEMIA: Primary | ICD-10-CM

## 2018-10-17 DIAGNOSIS — E03.9 ACQUIRED HYPOTHYROIDISM: ICD-10-CM

## 2018-10-17 DIAGNOSIS — N39.46 MIXED STRESS AND URGE URINARY INCONTINENCE: ICD-10-CM

## 2018-10-17 DIAGNOSIS — E55.9 VITAMIN D DEFICIENCY: ICD-10-CM

## 2018-10-17 DIAGNOSIS — R39.9 UTI SYMPTOMS: ICD-10-CM

## 2018-10-17 DIAGNOSIS — G20 PARKINSON'S DISEASE (HCC): ICD-10-CM

## 2018-10-17 DIAGNOSIS — E78.2 MIXED HYPERLIPIDEMIA: ICD-10-CM

## 2018-10-17 PROBLEM — Z12.11 SCREENING FOR COLON CANCER: Status: RESOLVED | Noted: 2018-08-29 | Resolved: 2018-10-17

## 2018-10-17 PROBLEM — R10.9 ABDOMINAL PAIN: Status: RESOLVED | Noted: 2018-08-29 | Resolved: 2018-10-17

## 2018-10-17 PROBLEM — R10.30 LOWER ABDOMINAL PAIN: Status: RESOLVED | Noted: 2018-08-29 | Resolved: 2018-10-17

## 2018-10-17 PROBLEM — K59.1 FUNCTIONAL DIARRHEA: Status: RESOLVED | Noted: 2018-08-29 | Resolved: 2018-10-17

## 2018-10-17 LAB
25(OH)D3 SERPL-MCNC: 26.2 NG/ML (ref 30–100)
ALBUMIN SERPL BCP-MCNC: 3.6 G/DL (ref 3.5–5)
ALP SERPL-CCNC: 70 U/L (ref 46–116)
ALT SERPL W P-5'-P-CCNC: 18 U/L (ref 12–78)
ANION GAP SERPL CALCULATED.3IONS-SCNC: 5 MMOL/L (ref 4–13)
AST SERPL W P-5'-P-CCNC: 18 U/L (ref 5–45)
BASOPHILS # BLD AUTO: 0.09 THOUSANDS/ΜL (ref 0–0.1)
BASOPHILS NFR BLD AUTO: 1 % (ref 0–1)
BILIRUB SERPL-MCNC: 0.49 MG/DL (ref 0.2–1)
BUN SERPL-MCNC: 16 MG/DL (ref 5–25)
CALCIUM SERPL-MCNC: 8.5 MG/DL (ref 8.3–10.1)
CHLORIDE SERPL-SCNC: 103 MMOL/L (ref 100–108)
CHOLEST SERPL-MCNC: 199 MG/DL (ref 50–200)
CO2 SERPL-SCNC: 28 MMOL/L (ref 21–32)
CREAT SERPL-MCNC: 0.79 MG/DL (ref 0.6–1.3)
EOSINOPHIL # BLD AUTO: 0.38 THOUSAND/ΜL (ref 0–0.61)
EOSINOPHIL NFR BLD AUTO: 5 % (ref 0–6)
ERYTHROCYTE [DISTWIDTH] IN BLOOD BY AUTOMATED COUNT: 13.3 % (ref 11.6–15.1)
GFR SERPL CREATININE-BSD FRML MDRD: 75 ML/MIN/1.73SQ M
GLUCOSE P FAST SERPL-MCNC: 89 MG/DL (ref 65–99)
HCT VFR BLD AUTO: 45.1 % (ref 34.8–46.1)
HDLC SERPL-MCNC: 51 MG/DL (ref 40–60)
HGB BLD-MCNC: 14.4 G/DL (ref 11.5–15.4)
IMM GRANULOCYTES # BLD AUTO: 0.03 THOUSAND/UL (ref 0–0.2)
IMM GRANULOCYTES NFR BLD AUTO: 0 % (ref 0–2)
LDLC SERPL CALC-MCNC: 123 MG/DL (ref 0–100)
LYMPHOCYTES # BLD AUTO: 2.62 THOUSANDS/ΜL (ref 0.6–4.47)
LYMPHOCYTES NFR BLD AUTO: 31 % (ref 14–44)
MCH RBC QN AUTO: 29.6 PG (ref 26.8–34.3)
MCHC RBC AUTO-ENTMCNC: 31.9 G/DL (ref 31.4–37.4)
MCV RBC AUTO: 93 FL (ref 82–98)
MONOCYTES # BLD AUTO: 0.59 THOUSAND/ΜL (ref 0.17–1.22)
MONOCYTES NFR BLD AUTO: 7 % (ref 4–12)
NEUTROPHILS # BLD AUTO: 4.65 THOUSANDS/ΜL (ref 1.85–7.62)
NEUTS SEG NFR BLD AUTO: 56 % (ref 43–75)
NONHDLC SERPL-MCNC: 148 MG/DL
NRBC BLD AUTO-RTO: 0 /100 WBCS
PLATELET # BLD AUTO: 271 THOUSANDS/UL (ref 149–390)
PMV BLD AUTO: 11 FL (ref 8.9–12.7)
POTASSIUM SERPL-SCNC: 4 MMOL/L (ref 3.5–5.3)
PROT SERPL-MCNC: 7 G/DL (ref 6.4–8.2)
RBC # BLD AUTO: 4.86 MILLION/UL (ref 3.81–5.12)
SODIUM SERPL-SCNC: 136 MMOL/L (ref 136–145)
TRIGL SERPL-MCNC: 127 MG/DL
TSH SERPL DL<=0.05 MIU/L-ACNC: 2.75 UIU/ML (ref 0.36–3.74)
WBC # BLD AUTO: 8.36 THOUSAND/UL (ref 4.31–10.16)

## 2018-10-17 PROCEDURE — 36415 COLL VENOUS BLD VENIPUNCTURE: CPT

## 2018-10-17 PROCEDURE — 80061 LIPID PANEL: CPT

## 2018-10-17 PROCEDURE — 82306 VITAMIN D 25 HYDROXY: CPT

## 2018-10-17 PROCEDURE — 84443 ASSAY THYROID STIM HORMONE: CPT

## 2018-10-17 PROCEDURE — 80053 COMPREHEN METABOLIC PANEL: CPT

## 2018-10-17 PROCEDURE — 85025 COMPLETE CBC W/AUTO DIFF WBC: CPT

## 2018-10-17 PROCEDURE — 99214 OFFICE O/P EST MOD 30 MIN: CPT | Performed by: INTERNAL MEDICINE

## 2018-10-17 RX ORDER — PANTOPRAZOLE SODIUM 40 MG/1
TABLET, DELAYED RELEASE ORAL
COMMUNITY
Start: 2018-09-05 | End: 2018-11-07

## 2018-10-17 NOTE — PROGRESS NOTES
INTERNAL MEDICINE OFFICE VISIT  [facility] AT [place] [address], [facility], [address]  Tel: [phone]      NAME: Ana Stoner  AGE: 68 y o  SEX: female  : 1945   MRN: [id number]    DATE: 10/17/2018  TIME: 12:57 PM      Assessment and Plan:  1  Mixed hyperlipidemia  She was told to follow a low-fat diet, follow-up lipid profile  - CBC and differential; Future  - Comprehensive metabolic panel; Future  - Lipid panel; Future    2  Acquired hypothyroidism  Continue levothyroxine at the same dose  - TSH, 3rd generation; Future    3  [facility]  Continue medications, follow up with Neurology    4  Mixed stress and urge urinary incontinence  Stable, she was told to do Kegel exercises    5  Vitamin D deficiency  She was told to increase the dose of vitamin-D to 4000 units daily as the vitamin-D level was low  - Vitamin D 25 hydroxy; Future    6  UTI symptoms  Will get back to her with the results of the urine culture  - Urinalysis with microscopic  - Urine culture; Future      - Counseling Documentation: patient was counseled regarding: diagnostic results, instructions for management, risk factor reductions, prognosis, patient and family education, impressions, risks and benefits of treatment options and importance of compliance with treatment  - Medication Side Effects: Adverse side effects of medications were reviewed with the patient/guardian today  Return for follow up visit in 6 months or earlier, if needed  Chief Complaint:  Chief Complaint   Patient presents with    Follow-up     2 months, starting to feel better          History of Present Illness:   Hyperlipidemia-she is not on any medication  She just follows a low-fat diet  Hypothyroidism-she takes levothyroxine regularly  Parkinson's disease-she follows up with Neurology and takes all her medications regularly  Incontinence-not on any medication    Vitamin-D deficiency-the vitamin-D level was 26 despite taking vitamin-D supplement  UTI symptoms-she had a UTI in August and even after being treated she says she continues to have a lot of fatigue and does not feel right  Active Problem List:  Patient Active Problem List   Diagnosis    [facility]'s disease ([facility] )    Scoliosis    Low back pain    Senile osteoporosis    Acquired hypothyroidism    Patient refuses to disclose advance directive information    Elevated antinuclear antibody (RAS) level    Family hx-breast malignancy    Mixed hyperlipidemia    Rosacea    Trochanteric bursitis    Urinary incontinence    Vitamin D deficiency         Past Medical History:  Past Medical History:   Diagnosis Date    Anxiety     Disease of thyroid gland     Generalized anxiety disorder 4/23/2015    Hepatic disease     Infectious viral hepatitis     Liver disease     Osteoporosis     [facility]     Rosacea     Shortness of breath          Past Surgical History:  Past Surgical History:   Procedure Laterality Date    FOOT SURGERY Bilateral     HYSTERECTOMY      [place] ESOPHAGOGASTRODUODENOSCOPY TRANSORAL DIAGNOSTIC N/A 9/5/2018    Procedure: EGD AND COLONOSCOPY;  Surgeon: Nataliya Espinoza MD;  Location: [facility];   Service: Gastroenterology    URETHRA SURGERY      laparoscopic urethral suspension for stress incontinence         Family History:  Family History   Problem Relation Age of Onset    Lung cancer Mother     Dementia Mother     Cancer Mother     Lung cancer Father     Cancer Father     Esophageal cancer Brother     Cancer Brother     Cavazos's esophagus Brother     Esophageal cancer Family     Heart disease Family     Stroke Family         syndrome         Social History:  Social History     Social History    Marital status: Significant Other     Spouse name: N/A    Number of children: N/A    Years of education: Master's Degree     Occupational History          Retired     Social History Main Topics    Smoking status: Never Smoker    Smokeless tobacco: Never Used    Alcohol use 0 6 oz/week     1 Glasses of wine per week      Comment: Alcohol use per [employer]    Drug use: No    Sexual activity: No     Other Topics Concern    None     Social History Narrative    Denied:  History of caffeine use             Allergies:   Allergies   Allergen Reactions    Simvastatin Other (See Comments)     Jaundice    Dust Mite Extract     Pollen Extract     Statins          Medications:    Current Outpatient Prescriptions:     aspirin (ECOTRIN LOW STRENGTH) 81 mg EC tablet, Take 81 mg by mouth daily, Disp: , Rfl:     carbidopa-levodopa (SINEMET)  mg per tablet, Take 1 5 tabs QID, Disp: 180 tablet, Rfl: 5    Cholecalciferol (VITAMIN D) 2000 units tablet, 1 daily, Disp: , Rfl:     Cranberry 1000 MG CAPS, Take 4,200 mg by mouth, Disp: , Rfl:     dicyclomine (BENTYL) 10 mg capsule, Take by mouth as needed  , Disp: , Rfl:     Homeopathic Products (PROSACEA) GEL, Apply topically, Disp: , Rfl:     hydrOXYzine HCL (ATARAX) 10 mg tablet, Take by mouth, Disp: , Rfl:     levothyroxine 50 mcg tablet, Take 50 mcg by mouth, Disp: , Rfl:     Multiple Vitamin (MULTIVITAMINS PO), daily, Disp: , Rfl:     pantoprazole (PROTONIX) 40 mg tablet, , Disp: , Rfl:     polyethylene glycol (GLYCOLAX) powder, , Disp: , Rfl:     polyethylene glycol (MIRALAX) 17 g packet, Take 17 g by mouth daily, Disp: , Rfl:     rasagiline (AZILECT) 1 MG, Take 1 tablet (1 mg total) by mouth daily, Disp: 30 each, Rfl: 3    rotigotine (NEUPRO) 4 MG/24HR, Place 1 patch on the skin daily, Disp: 30 patch, Rfl: 5      The following portions of the patient's history were reviewed and updated as appropriate: past medical history, past surgical history, family history, social history, allergies, current medications and active problem list       Review of Systems:  Constitutional: Denies fever, chills, weight gain, weight loss, fatigue  Eyes: Denies eye redness, eye discharge, double vision, change in visual acuity  ENT: Denies hearing loss, tinnitus, sneezing, nasal congestion, nasal discharge, sore throat   Respiratory: Denies cough, expectoration, hemoptysis, shortness of breath, wheezing  Cardiovascular: Denies chest pain, palpitations, lower extremity swelling, orthopnea, PND  Gastrointestinal: Denies abdominal pain, heartburn, nausea, vomiting, hematemesis, diarrhea, bloody stools  Genito-Urinary: Denies dysuria, complains of frequency,  nocturia, incontinence  Musculoskeletal: Denies back pain, joint pain, muscle pain  Neurologic: Denies confusion, lightheadedness, syncope, headache, focal weakness, sensory changes, seizures  Endocrine: Denies polyuria, polydipsia, temperature intolerance  Allergy and Immunology: Denies hives, insect bite sensitivity  Hematological and Lymphatic: Denies bleeding problems, swollen glands   Psychological: Denies depression, suicidal ideation, anxiety, panic, mood swings  Dermatological: Denies pruritus, rash, skin lesion changes      Vitals:  Vitals:    10/17/18 1224   BP: 118/80   Pulse: 86   SpO2: 95%       Body mass index is 25 05 kg/m²  Weight (last 2 days)     Date/Time   Weight    10/17/18 1224  60 1 (132 6)                Physical Examination:  General: Patient is not in acute distress  Awake, alert, responding to commands  No weight gain or loss  Head: Normocephalic  Atraumatic  Eyes: Conjunctiva and lids with no swelling, erythema or discharge  Both pupils normal sized, round and reactive to light  Sclera nonicteric  ENT: External examination of nose and ear normal  Otoscopic examination shows translucent tympanic membranes with patent canals without erythema  Oropharynx moist with no erythema, edema, exudate or lesions  Neck: Supple  JVP not raised  Trachea midline  No masses  No thyromegaly  Lungs: No signs of increased work of breathing or respiratory distress   Bilateral bronchovascular breath sounds with no crackles or rhonchi  Chest wall: No tenderness  Cardiovascular: Normal PMI  No thrills  Regular rate and rhythm  S1 and S2 normal  No murmur, rub or gallop  Gastrointestinal: Abdomen soft, nontender  No guarding or rigidity  Liver and spleen not palpable  Bowel sounds present  Neurologic: Cranial nerves II-XII intact   Cortical functions normal  Motor system - Reflexes 2+ and symmetrical  Sensations normal  Musculoskeletal: Gait normal  No joint tenderness  Integumentary: Skin normal with no rash or lesions  Lymphatic: No palpable lymph nodes in neck, axilla or groin  Extremities: No clubbing, cyanosis, edema or varicosities  Psychological: Judgement and insight normal  Mood and affect normal      Laboratory Results:  CBC with diff:   Lab Results   Component Value Date    WBC 8 36 10/17/2018    RBC 4 86 10/17/2018    HGB 14 4 10/17/2018    HCT 45 1 10/17/2018    MCV 93 10/17/2018    MCH 29 6 10/17/2018    RDW 13 3 10/17/2018     10/17/2018       CMP:  Lab Results   Component Value Date    CREATININE 0 79 10/17/2018    BUN 16 10/17/2018     10/17/2018    K 4 0 10/17/2018     10/17/2018    CO2 28 10/17/2018    ALKPHOS 70 10/17/2018    ALT 18 10/17/2018    AST 18 10/17/2018       Lab Results   Component Value Date    MG 2 4 07/25/2018       No results found for: TROPONINI, CKMB, CKTOTAL    Lipid Profile:   No results found for: CHOL  Lab Results   Component Value Date    HDL 51 10/17/2018     Lab Results   Component Value Date    LDLCALC 123 (H) 10/17/2018     Lab Results   Component Value Date    TRIG 127 10/17/2018         Health Maintenance:  Health Maintenance   Topic Date Due    INFLUENZA VACCINE  07/01/2018    Fall Risk  08/14/2019    Depression Screening PHQ  08/14/2019    Urinary Incontinence Screening  08/14/2019    DTaP,Tdap,and Td Vaccines (2 - Td) 10/25/2022    CRC Screening: Colonoscopy  09/05/2028    Pneumococcal PPSV23/PCV13 65+ Years / Low and Medium Risk  Completed Immunization History   Administered Date(s) Administered    H1N1 Inj 12/06/2009    H1N1, All Formulations 12/06/2009, 12/14/2009    Hep A, adult 06/06/1996, 05/22/1997    Influenza 12/04/2006, 11/07/2007, 10/03/2008, 02/12/2010, 09/15/2010, 09/21/2011, 10/25/2012, 11/01/2013, 10/15/2014, 10/05/2015, 10/02/2016, 11/13/2017    Influenza TIV (IM) 12/04/2006, 11/07/2007, 10/03/2008, 02/12/2010, 09/15/2010, 09/21/2011, 10/25/2012, 11/01/2013, 10/15/2014    Pneumococcal Conjugate 13-Valent 05/03/2016    Pneumococcal Polysaccharide PPV23 03/17/2011    Td (adult), adsorbed 06/10/2002    Tdap 10/25/2012    Zoster 09/12/2012         Feliberto Reeves MD  10/17/2018,12:57 PM

## 2018-10-18 ENCOUNTER — APPOINTMENT (OUTPATIENT)
Dept: LAB | Facility: CLINIC | Age: 73
End: 2018-10-18
Payer: COMMERCIAL

## 2018-10-18 LAB
BACTERIA UR QL AUTO: ABNORMAL /HPF
BILIRUB UR QL STRIP: NEGATIVE
CLARITY UR: ABNORMAL
COLOR UR: YELLOW
GLUCOSE UR STRIP-MCNC: NEGATIVE MG/DL
HGB UR QL STRIP.AUTO: NEGATIVE
HYALINE CASTS #/AREA URNS LPF: ABNORMAL /LPF
KETONES UR STRIP-MCNC: ABNORMAL MG/DL
LEUKOCYTE ESTERASE UR QL STRIP: ABNORMAL
NITRITE UR QL STRIP: NEGATIVE
NON-SQ EPI CELLS URNS QL MICRO: ABNORMAL /HPF
PH UR STRIP.AUTO: 6.5 [PH] (ref 4.5–8)
PROT UR STRIP-MCNC: NEGATIVE MG/DL
RBC #/AREA URNS AUTO: ABNORMAL /HPF
SP GR UR STRIP.AUTO: 1.02 (ref 1–1.03)
UROBILINOGEN UR QL STRIP.AUTO: 0.2 E.U./DL
WBC #/AREA URNS AUTO: ABNORMAL /HPF

## 2018-10-18 PROCEDURE — 87086 URINE CULTURE/COLONY COUNT: CPT

## 2018-10-18 PROCEDURE — 81001 URINALYSIS AUTO W/SCOPE: CPT | Performed by: INTERNAL MEDICINE

## 2018-10-19 ENCOUNTER — TELEPHONE (OUTPATIENT)
Dept: INTERNAL MEDICINE CLINIC | Facility: CLINIC | Age: 73
End: 2018-10-19

## 2018-10-19 LAB — BACTERIA UR CULT: NORMAL

## 2018-10-22 ENCOUNTER — TELEPHONE (OUTPATIENT)
Dept: NEUROLOGY | Facility: CLINIC | Age: 73
End: 2018-10-22

## 2018-11-07 ENCOUNTER — OFFICE VISIT (OUTPATIENT)
Dept: GASTROENTEROLOGY | Facility: CLINIC | Age: 73
End: 2018-11-07
Payer: COMMERCIAL

## 2018-11-07 VITALS
BODY MASS INDEX: 25.04 KG/M2 | HEART RATE: 89 BPM | SYSTOLIC BLOOD PRESSURE: 88 MMHG | DIASTOLIC BLOOD PRESSURE: 62 MMHG | WEIGHT: 132.5 LBS

## 2018-11-07 DIAGNOSIS — K58.2 IRRITABLE BOWEL SYNDROME WITH BOTH CONSTIPATION AND DIARRHEA: ICD-10-CM

## 2018-11-07 DIAGNOSIS — K21.00 GASTROESOPHAGEAL REFLUX DISEASE WITH ESOPHAGITIS: ICD-10-CM

## 2018-11-07 DIAGNOSIS — Z09 FOLLOW UP: Primary | ICD-10-CM

## 2018-11-07 PROCEDURE — 99214 OFFICE O/P EST MOD 30 MIN: CPT | Performed by: PHYSICIAN ASSISTANT

## 2018-11-07 RX ORDER — OMEPRAZOLE 20 MG/1
20 CAPSULE, DELAYED RELEASE ORAL DAILY
Qty: 30 CAPSULE | Refills: 0 | Status: SHIPPED | OUTPATIENT
Start: 2018-11-07 | End: 2018-11-29 | Stop reason: SDUPTHER

## 2018-11-07 NOTE — PROGRESS NOTES
[facility]  Nikita Toño 68 y o  female MRN: [id number]       CC: Follow-up for IBS     HPI: Max Beltran is a 64-year-old female with history of Parkinson's, hypothyroidism, and hyperlipidemia  Patient was following with us for symptoms of lower abdominal pain  The patient was reporting alternating bowel habits of constipation and diarrhea at that time  She was also recently on a antibiotic course for UTI  Her C diff was negative  EGD and colonoscopy were performed  EGD revealed LA class B reflux esophagitis  Nonerosive gastritis was also noted  Biopsies were negative for H pylori or changes suggesting celiac disease  Focal prominent intestinal metaplasia was noted on gastric biopsy  No dysplasia or intestinal metaplasia on esophageal biopsy  The patient was especially worried about this since she has a brother that passed away from esophageal cancer  Colonoscopy revealed sigmoid diverticulosis  Otherwise, the patient had a negative biopsy for microscopic colitis  Since her procedures, she has been taking Benefiber and a probiotic daily  The patient reports that her bowel habits are more regular  She has not required Bentyl as her abdominal pain has subsided  She denies melena, hematochezia, nausea or vomiting  Review of Systems:    CONSTITUTIONAL: Denies any fever, chills, or rigors  Good appetite, and no recent weight loss  HEENT: No earache or tinnitus  Denies hearing loss or visual disturbances  CARDIOVASCULAR: No chest pain or palpitations  RESPIRATORY: Denies any cough, hemoptysis, shortness of breath or dyspnea on exertion  GASTROINTESTINAL: As noted in the History of Present Illness  GENITOURINARY: No problems with urination  Denies any hematuria or dysuria  NEUROLOGIC: No dizziness or vertigo, denies headaches  MUSCULOSKELETAL: Denies any muscle or joint pain  SKIN: Denies skin rashes or itching     ENDOCRINE: Denies excessive thirst  Denies intolerance to heat or cold  PSYCHOSOCIAL: Denies depression or anxiety  Denies any recent memory loss  Current Outpatient Prescriptions   Medication Sig Dispense Refill    aspirin (ECOTRIN LOW STRENGTH) 81 mg EC tablet Take 81 mg by mouth daily      carbidopa-levodopa (SINEMET)  mg per tablet Take 1 5 tabs QID (Patient taking differently: 1 tablet 4 (four) times a day Take 1 5 tabs QID ) 180 tablet 5    Cholecalciferol (VITAMIN D) 2000 units tablet 1 daily      Cranberry 1000 MG CAPS Take 4,200 mg by mouth      dicyclomine (BENTYL) 10 mg capsule Take by mouth as needed        Homeopathic Products (PROSACEA) GEL Apply topically      hydrOXYzine HCL (ATARAX) 10 mg tablet Take by mouth      levothyroxine 50 mcg tablet Take 50 mcg by mouth      Multiple Vitamin (MULTIVITAMINS PO) daily      polyethylene glycol (MIRALAX) 17 g packet Take 17 g by mouth daily      rasagiline (AZILECT) 1 MG Take 1 tablet (1 mg total) by mouth daily 30 each 3    rotigotine (NEUPRO) 4 MG/24HR Place 1 patch on the skin daily 30 patch 5    Wheat Dextrin (BENEFIBER DRINK MIX PO) Take by mouth      omeprazole (PriLOSEC) 20 mg delayed release capsule Take 1 capsule (20 mg total) by mouth daily 30 capsule 0    polyethylene glycol (GLYCOLAX) powder        No current facility-administered medications for this visit  Past Medical History:   Diagnosis Date    Anxiety     Disease of thyroid gland     Generalized anxiety disorder 4/23/2015    Hepatic disease     Infectious viral hepatitis     Liver disease     Osteoporosis     Parkinson disease ([address] )     Rosacea     Shortness of breath      Past Surgical History:   Procedure Laterality Date    FOOT SURGERY Bilateral     HYSTERECTOMY      [place] ESOPHAGOGASTRODUODENOSCOPY TRANSORAL DIAGNOSTIC N/A 9/5/2018    Procedure: EGD AND COLONOSCOPY;  Surgeon: Efrain Armenta MD;  Location: [facility];   Service: Gastroenterology    URETHRA SURGERY      laparoscopic urethral suspension for stress incontinence     Social History     Social History    Marital status: Significant Other     Spouse name: N/A    Number of children: N/A    Years of education: Master's Degree     Occupational History          Retired     Social History Main Topics    Smoking status: Never Smoker    Smokeless tobacco: Never Used    Alcohol use 0 6 oz/week     1 Glasses of wine per week      Comment: Alcohol use per [employer]scripts    Drug use: No    Sexual activity: No     Other Topics Concern    None     Social History Narrative    Denied:  History of caffeine use         Family History   Problem Relation Age of Onset    Lung cancer Mother     Dementia Mother     Cancer Mother     Lung cancer Father     Cancer Father     Esophageal cancer Brother     Cancer Brother     Cavazos's esophagus Brother     Esophageal cancer Family     Heart disease Family     Stroke Family         syndrome            PHYSICAL EXAM:    Vitals:    11/07/18 1050   BP: (!) 88/62   Pulse: 89   Weight: 60 1 kg (132 lb 8 oz)     General Appearance:   Alert and oriented x 3   Cooperative, and in no respiratory distress   HEENT:   Normocephalic, atraumatic, anicteric      Neck:  Supple, symmetrical, trachea midline   Lungs:   Clear to auscultation bilaterally    Heart[de-identified]   Regular rate and rhythm   Abdomen:   Soft, non-tender, non-distended; normal bowel sounds; no masses, no organomegaly    Genitalia:   Deferred    Rectal:   Deferred    Extremities:  No cyanosis, clubbing or edema    Pulses:  2+ and symmetric all extremities    Skin:  Skin color, texture, turgor normal, no rashes or lesions    Lymph nodes:  No palpable cervical or supraclavicular lymphadenopathy        Lab Results:     Results from last 6 Months  Lab Units 10/17/18  0756   WBC Thousand/uL 8 36   HEMOGLOBIN g/dL 14 4   HEMATOCRIT % 45 1   PLATELETS Thousands/uL 271   NEUTROS PCT % 56   LYMPHS PCT % 31   MONOS PCT % 7   EOS PCT % 5       Results from last 6 Months  Lab Units 10/17/18  0756   POTASSIUM mmol/L 4 0   CHLORIDE mmol/L 103   CO2 mmol/L 28   BUN mg/dL 16   CREATININE mg/dL 0 79   CALCIUM mg/dL 8 5   ALK PHOS U/L 70   ALT U/L 18   AST U/L 18           Results from last 6 Months  Lab Units 07/25/18  2044   LIPASE u/L 142         ASSESSMENT and PLAN:      1) IBS - Patient is currently doing well on fiber and probiotic  She had a negative C diff test   She has not had diarrhea  - Would continue probiotic and fiber supplement as she is doing  - She will follow-up if her symptoms worsen in the future    2) Reflux esophagitis and gastritis - Patient reports the Protonix was making her nauseous  No signs of Cavazos's on EGD  She denies GERD symptoms  - Will change to omeprazole 20 mg for 4 more weeks to complete a 12 week course  - She will take this 30 minutes before dinner time away from her thyroid and Parkinson's medication    3) Intestinal metaplasia on gastric biopsy - Patient has a family history of esophageal cancer, but not gastric  There is no specific guidelines for this  - Recall in 1-2 years        Follow up as needed

## 2018-11-27 ENCOUNTER — OFFICE VISIT (OUTPATIENT)
Dept: NEUROLOGY | Facility: CLINIC | Age: 73
End: 2018-11-27
Payer: COMMERCIAL

## 2018-11-27 VITALS
DIASTOLIC BLOOD PRESSURE: 80 MMHG | WEIGHT: 133.3 LBS | BODY MASS INDEX: 25.17 KG/M2 | HEIGHT: 61 IN | SYSTOLIC BLOOD PRESSURE: 120 MMHG

## 2018-11-27 DIAGNOSIS — G47.52 REM BEHAVIORAL DISORDER: ICD-10-CM

## 2018-11-27 DIAGNOSIS — G20 PARKINSON DISEASE (HCC): Primary | ICD-10-CM

## 2018-11-27 PROCEDURE — 99215 OFFICE O/P EST HI 40 MIN: CPT | Performed by: PSYCHIATRY & NEUROLOGY

## 2018-11-27 PROCEDURE — 4040F PNEUMOC VAC/ADMIN/RCVD: CPT | Performed by: PSYCHIATRY & NEUROLOGY

## 2018-11-27 RX ORDER — SODIUM FLUORIDE 0.1 MG/ML
RINSE ORAL DAILY
COMMUNITY
End: 2022-02-08 | Stop reason: ALTCHOICE

## 2018-11-27 NOTE — ASSESSMENT & PLAN NOTE
The patient is a 67-year-old right-handed woman with Parkinson's disease, symptom onset in 6295, complicated by wearing off and very mild dyskinesias  The patient's wearing off appears to consist primarily of non motor symptoms, particularly fatigue  Her exam today demonstrates very minimal parkinsonism, worse on the left  The patient stopped driving about a year ago but is interested in resuming  will send her to a fitness to drive  I would not recommend any increase in her medications at this point  She will continue working on her home physical therapy program     We discussed the option of taking 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

## 2018-11-27 NOTE — PROGRESS NOTES
Assessment/Plan:    Parkinson's disease [facility])  The patient is a 69-year-old right-handed woman with Parkinson's disease, symptom onset in 5177, complicated by wearing off and very mild dyskinesias  The patient's wearing off appears to consist primarily of non motor symptoms, particularly fatigue  Her exam today demonstrates very minimal parkinsonism, worse on the left  The patient stopped driving about a year ago but is interested in resuming  will send her to a fitness to drive  I would not recommend any increase in her medications at this point  She will continue working on her home physical therapy program     We discussed the option of taking 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  Diagnoses and all orders for this visit:    Parkinson disease [facility])  -     Ambulatory referral to Occupational Therapy; Future    REM behavioral disorder    Other orders  -     Probiotic Product (ALIGN) CHEW; Chew  -     Homeopathic Products (PROSACEA) GEL; Apply topically        Total time spent today 50 minutes  Greater than 50% of total time was spent with the patient and / or family counseling and / or coordination of care    Subjective:     Patient ID: Yani Eden is a 68 y o  female  I had the pleasure of seeing your patient, Yani Eden in the Movement Disorders Clinic at the [address] [place] [address] Neuroscience  Dara Kenneth is a 68year-old right-handed woman with levodopa-responsive Parkinson's disease, symptom onset in 2011 with left foot tremor, now complicated by non-motor wearing-off and slight dyskinesias  In Fall of 2011 she noted some balance trouble and later developed micrographia  She was started on Azilect 1mg with little notable improvement  Neupro patch later added  Sinemet was added later and has been titrated up to 1 5 tabs 4 times per day  Mornings are ok   Doesn't need to take her medications right away  Fatigue is her most bothersome symptom  Was very active in the past - frustrated by her in ability to be as active as she used to be  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  Regarding motor symptoms:   Tremor: minimal these days   Slowness: everything is a bit slower   Stiffness: a little first thing  Changes in gait: usually ok  Uses a rolling walker  Can be slow in the mornings       Falls: no        Freezing: rare but sometimes  Trouble with swallowing: no     Regarding non-motor symptoms:   Lightheadedness: no   Mood: pretty good  Sleep: good  Does have dream reenactment occasionally   Memory trouble: no   Hallucinations: no   Driving issues: Stopped driving about 1 year ago  Physical activity: does an program at home called "delay the disease" Jazmine Jimenez  Regarding medication complications:  Wearing off: feels it wearing off but doesn't get into the OFF state  Fatigue is the main symptom  Dyskinesias: none per the patient  Impulse control issues: no   Daytime drowsiness: when meds wear off  Medications and timing:  - Azilect 1mg in the evening   - sinemet 25/100; 1 5 tabs, 4 times per day at 7:30am, 11am, 2:30pm, 6pm roughly  - Neupro Patch 4mg daily     The following portions of the patient's history were reviewed and updated as appropriate: allergies, current medications, past family history, past medical history, past social history, past surgical history and problem list       Objective:      /80 (BP Location: Left arm, Patient Position: Standing, Cuff Size: Standard)   Ht 5' 1" (1 549 m)   Wt 60 5 kg (133 lb 4 8 oz)   BMI 25 19 kg/m²         Physical Exam    Neurological Exam    GENERAL MEDICAL EXAMINATION:  General appearance: alert, in no apparent distress  Appropriately dressed and groomed  Conversing and interacting appropriately  Eyes: Sclera are non-injected     Ears, nose, Mouth, Throat: Mucous membranes are moist    Resp: Breathing comfortably on RA   Musculoskeletal: No evidence of deformities  No contractures  No Edema  Skin: No visible rashes  Warm and well perfused  Psych: normal and appropriate affect     Mental Status:  Alert and oriented to person place and time  Able to relate history without difficulty  Attentive to conversation  Language is fluent and appropriate with normal prosody  There were no paraphasic errors  Speech was not dysarthric  Able to follow both midline and appendicular commands  Time since last dose:  3 hours     Speech  1    Facial Expression  1    Rigidity - Neck  0    Rigidity - Upper Extremity (Right)  0    Rigidity - Upper Extremity (Left)   1    Rigidity - Lower Extremity (Right)  0    Rigidity - Lower Extremity (Left)   1    Finger Taps (Right)   0    Finger Taps (Left)   0    Hand Movement (Right)  0    Hand Movement (Left)   0    Pronation/Supination (Right)  0    Pronation/Supination (Left)   0    Toe Tapping (Right) 0    Toe Tapping (Left) 2    Leg Agility (Right)  0    Leg Agility (Left)   1    Arising from Chair   0    Gait   0    Freezing of Gait 0    Postural Stability   x    Posture 1    Global spontaneity of movement 1    Postural Tremor (Right) 0    Postural Tremor (Left) 0    Kinetic Tremor (Right)  0    Kinetic Tremor (Left)  0    Rest tremor amplitude RUE 0    Rest tremor amplitude LUE 0    Rest tremor amplitude RLE 0    Reset tremor amplitude LLE 2    Lip/Jaw Tremor  0    Consistency of tremor 1    Motor Exam Total:        Mild diskynesias mostly in the neck  Decreased arm swing on the right  Review of Systems   Constitutional: Positive for fatigue  Negative for appetite change and fever  HENT: Negative  Negative for hearing loss, tinnitus, trouble swallowing and voice change  Eyes: Negative  Negative for photophobia and pain  Respiratory: Negative  Negative for shortness of breath  Cardiovascular: Negative  Negative for palpitations     Gastrointestinal: Negative  Negative for nausea and vomiting  Endocrine: Negative  Negative for cold intolerance and heat intolerance  Genitourinary: Negative  Negative for dysuria, frequency and urgency  Musculoskeletal: Positive for gait problem (difficulty walking)  Negative for myalgias and neck pain  Skin: Negative  Negative for rash  Neurological: Negative for dizziness, tremors, seizures, syncope, facial asymmetry, speech difficulty, weakness, light-headedness, numbness and headaches  Hematological: Negative  Does not bruise/bleed easily  Psychiatric/Behavioral: Negative  Negative for confusion, hallucinations and sleep disturbance

## 2018-11-27 NOTE — PATIENT INSTRUCTIONS
For constipation:  -Get regular exercise  -Drink lots of water  -Every day have at least one serving of fruit with high fiber foods:   Here are some options: applesauce, blueberries, activia yogurt,  bran (like All-Bran or Fiber-one) or prunes with oatmeal, bran or polenta  Consider adding 1-2 tablespoons of ground flax seed as well  - Remember that bananas are constipating  So are refined grains like white rice and white pasta (whole wheat pasta and brown rice ok)  -You can also try a probiotic, which you can find at natural food stores, online, or through foods  Foods with probiotics include yogurt and fermented foods like pickles, sauerkraut, kimchee and miso  There is also a juice called "Good Belly" and a drink called "kefir" with probiotics  - There are medications that can also be helpful:   Docusate sodium (stool softener), 100 mg, 1-3 tabs at night   Miralax once per day                                          Parkinson's Disease Resources     Helpful web sites   -  www [employer]  org   -  www parkinson  org (carole Mcconnell)   -  [address] ([facility] [employer]) - Order the "Every Victory Counts" gabriel under the "resources" tab

## 2018-11-27 NOTE — LETTER
November 27, 2018     Cinthya Ha MD  [address] [place] [id number]    Patient: Mayur Win   YOB: 1945   Date of Visit: 11/27/2018       Dear Dr Elvia Mendoza:    Thank you for referring Micheal Murillo to me for evaluation  Below are my notes for this consultation  If you have questions, please do not hesitate to call me  I look forward to following your patient along with you  Sincerely,        Alisia Jones MD        CC: No Recipients  Alisia Jones MD  11/27/2018 11:52 AM  Sign at close encounter  Assessment/Plan:    Parkinson's disease [facility])  The patient is a 16-year-old right-handed woman with Parkinson's disease, symptom onset in 2472, complicated by wearing off and very mild dyskinesias  The patient's wearing off appears to consist primarily of non motor symptoms, particularly fatigue  Her exam today demonstrates very minimal parkinsonism, worse on the left  The patient stopped driving about a year ago but is interested in resuming  will send her to a fitness to drive  I would not recommend any increase in her medications at this point  She will continue working on her home physical therapy program     We discussed the option of taking 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  Diagnoses and all orders for this visit:    Parkinson disease [facility])  -     Ambulatory referral to Occupational Therapy; Future    REM behavioral disorder    Other orders  -     Probiotic Product (ALIGN) CHEW; Chew  -     Homeopathic Products (PROSACEA) GEL; Apply topically        Total time spent today 50 minutes  Greater than 50% of total time was spent with the patient and / or family counseling and / or coordination of care    Subjective:     Patient ID: Mayur Win is a 68 y o  female      I had the pleasure of seeing your patient, Mayur Win in the Movement Disorders Clinic at the [facility]  [address] [facility]  Corazon Granados is a 68year-old right-handed woman with levodopa-responsive Parkinson's disease, symptom onset in 2011 with left foot tremor, now complicated by non-motor wearing-off and slight dyskinesias  In Fall of 2011 she noted some balance trouble and later developed micrographia  She was started on Azilect 1mg with little notable improvement  Neupro patch later added  Sinemet was added later and has been titrated up to 1 5 tabs 4 times per day  Mornings are ok  Doesn't need to take her medications right away  Fatigue is her most bothersome symptom  Was very active in the past - frustrated by her in ability to be as active as she used to be  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  Regarding motor symptoms:   Tremor: minimal these days   Slowness: everything is a bit slower   Stiffness: a little first thing  Changes in gait: usually ok  Uses a rolling walker  Can be slow in the mornings       Falls: no        Freezing: rare but sometimes  Trouble with swallowing: no     Regarding non-motor symptoms:   Lightheadedness: no   Mood: pretty good  Sleep: good  Does have dream reenactment occasionally   Memory trouble: no   Hallucinations: no   Driving issues: Stopped driving about 1 year ago  Physical activity: does an program at home called "delay the disease" Kelly Long  Regarding medication complications:  Wearing off: feels it wearing off but doesn't get into the OFF state  Fatigue is the main symptom  Dyskinesias: none per the patient  Impulse control issues: no   Daytime drowsiness: when meds wear off       Medications and timing:  - Azilect 1mg in the evening   - sinemet 25/100; 1 5 tabs, 4 times per day at 7:30am, 11am, 2:30pm, 6pm roughly  - Neupro Patch 4mg daily     The following portions of the patient's history were reviewed and updated as appropriate: allergies, current medications, past family history, past medical history, past social history, past surgical history and problem list       Objective:      /80 (BP Location: Left arm, Patient Position: Standing, Cuff Size: Standard)   Ht 5' 1" (1 549 m)   Wt 60 5 kg (133 lb 4 8 oz)   BMI 25 19 kg/m²          Physical Exam    Neurological Exam    GENERAL MEDICAL EXAMINATION:  General appearance: alert, in no apparent distress  Appropriately dressed and groomed  Conversing and interacting appropriately  Eyes: Sclera are non-injected  Ears, nose, Mouth, Throat: Mucous membranes are moist    Resp: Breathing comfortably on RA   Musculoskeletal: No evidence of deformities  No contractures  No Edema  Skin: No visible rashes  Warm and well perfused  Psych: normal and appropriate affect     Mental Status:  Alert and oriented to person place and time  Able to relate history without difficulty  Attentive to conversation  Language is fluent and appropriate with normal prosody  There were no paraphasic errors  Speech was not dysarthric  Able to follow both midline and appendicular commands                                  Time since last dose:  3 hours     Speech  1    Facial Expression  1    Rigidity - Neck  0    Rigidity - Upper Extremity (Right)  0    Rigidity - Upper Extremity (Left)   1    Rigidity - Lower Extremity (Right)  0    Rigidity - Lower Extremity (Left)   1    Finger Taps (Right)   0    Finger Taps (Left)   0    Hand Movement (Right)  0    Hand Movement (Left)   0    Pronation/Supination (Right)  0    Pronation/Supination (Left)   0    Toe Tapping (Right) 0    Toe Tapping (Left) 2    Leg Agility (Right)  0    Leg Agility (Left)   1    Arising from Chair   0    Gait   0    Freezing of Gait 0    Postural Stability   x    Posture 1    Global spontaneity of movement 1    Postural Tremor (Right) 0    Postural Tremor (Left) 0    Kinetic Tremor (Right)  0    Kinetic Tremor (Left)  0    Rest tremor amplitude RUE 0    Rest tremor amplitude LUE 0    Rest tremor amplitude RLE 0    Reset tremor amplitude LLE 2    Lip/Jaw Tremor  0    Consistency of tremor 1    Motor Exam Total:        Mild diskynesias mostly in the neck  Decreased arm swing on the right  Review of Systems   Constitutional: Positive for fatigue  Negative for appetite change and fever  HENT: Negative  Negative for hearing loss, tinnitus, trouble swallowing and voice change  Eyes: Negative  Negative for photophobia and pain  Respiratory: Negative  Negative for shortness of breath  Cardiovascular: Negative  Negative for palpitations  Gastrointestinal: Negative  Negative for nausea and vomiting  Endocrine: Negative  Negative for cold intolerance and heat intolerance  Genitourinary: Negative  Negative for dysuria, frequency and urgency  Musculoskeletal: Positive for gait problem (difficulty walking)  Negative for myalgias and neck pain  Skin: Negative  Negative for rash  Neurological: Negative for dizziness, tremors, seizures, syncope, facial asymmetry, speech difficulty, weakness, light-headedness, numbness and headaches  Hematological: Negative  Does not bruise/bleed easily  Psychiatric/Behavioral: Negative  Negative for confusion, hallucinations and sleep disturbance

## 2018-11-29 DIAGNOSIS — K21.00 GASTROESOPHAGEAL REFLUX DISEASE WITH ESOPHAGITIS: ICD-10-CM

## 2018-11-29 NOTE — TELEPHONE ENCOUNTER
Patient called to requests refill of omeprazole 20mg Please call [address] [facility] [phone]

## 2018-11-30 RX ORDER — OMEPRAZOLE 20 MG/1
20 CAPSULE, DELAYED RELEASE ORAL DAILY
Qty: 30 CAPSULE | Refills: 2 | Status: SHIPPED | OUTPATIENT
Start: 2018-11-30 | End: 2019-04-01 | Stop reason: ALTCHOICE

## 2018-12-14 ENCOUNTER — TELEPHONE (OUTPATIENT)
Dept: GASTROENTEROLOGY | Facility: CLINIC | Age: 73
End: 2018-12-14

## 2018-12-14 DIAGNOSIS — E07.9 THYROID DISORDER: Primary | ICD-10-CM

## 2018-12-14 RX ORDER — LEVOTHYROXINE SODIUM 0.05 MG/1
50 TABLET ORAL DAILY
Qty: 90 TABLET | Refills: 2 | Status: SHIPPED | OUTPATIENT
Start: 2018-12-14 | End: 2019-09-21 | Stop reason: SDUPTHER

## 2018-12-14 NOTE — TELEPHONE ENCOUNTER
Patient called requesting a refill of omeprazole please call [facility] [phone] ty

## 2018-12-14 NOTE — TELEPHONE ENCOUNTER
Called pt and advised order was sent to [facility] for Omeprazole on 11/30/18 with 2 refills

## 2019-01-28 DIAGNOSIS — G20 PARKINSON'S DISEASE (HCC): ICD-10-CM

## 2019-01-29 RX ORDER — ROTIGOTINE 4 MG/24H
PATCH, EXTENDED RELEASE TRANSDERMAL
Qty: 30 PATCH | Refills: 5 | Status: SHIPPED | OUTPATIENT
Start: 2019-01-29 | End: 2019-07-23 | Stop reason: SDUPTHER

## 2019-01-29 RX ORDER — RASAGILINE 1 MG/1
TABLET ORAL
Qty: 30 TABLET | Refills: 3 | Status: SHIPPED | OUTPATIENT
Start: 2019-01-29 | End: 2019-05-07 | Stop reason: SDUPTHER

## 2019-02-11 ENCOUNTER — OFFICE VISIT (OUTPATIENT)
Dept: INTERNAL MEDICINE CLINIC | Facility: CLINIC | Age: 74
End: 2019-02-11
Payer: COMMERCIAL

## 2019-02-11 VITALS
SYSTOLIC BLOOD PRESSURE: 96 MMHG | HEART RATE: 80 BPM | OXYGEN SATURATION: 92 % | WEIGHT: 133.4 LBS | HEIGHT: 61 IN | BODY MASS INDEX: 25.19 KG/M2 | DIASTOLIC BLOOD PRESSURE: 60 MMHG

## 2019-02-11 DIAGNOSIS — F41.9 ANXIETY: Primary | ICD-10-CM

## 2019-02-11 PROCEDURE — 99213 OFFICE O/P EST LOW 20 MIN: CPT | Performed by: INTERNAL MEDICINE

## 2019-02-11 NOTE — PROGRESS NOTES
INTERNAL MEDICINE OFFICE VISIT  [facility]  Julia Izaguirre, [place], [address] [facility]  Tel: [phone]      NAME: Alma Hendrickson  AGE: 68 y o  SEX: female  : 1945   MRN: [id number]    DATE: 2019  TIME: 2:32 PM      Assessment and Plan:  1  Anxiety  Patient has been very anxious in the last few months mostly because of her daughter, with whom she does not get along too well  This morning she had a panic attack and was very concerned  In the past she was taking Zoloft  I will restart the medication  She was also told to continue following up with her therapist regularly  I will see her back in 2 months  - sertraline (ZOLOFT) 50 mg tablet; Take 1 tablet (50 mg total) by mouth daily  Dispense: 90 tablet; Refill: 1      - Counseling Documentation: patient was counseled regarding: instructions for management, risk factor reductions, prognosis, patient and family education, impressions, risks and benefits of treatment options and importance of compliance with treatment  - Medication Side Effects: Adverse side effects of medications were reviewed with the patient/guardian today  Return for follow up visit in 2 months or earlier, if needed  Chief Complaint:  Chief Complaint   Patient presents with    Panic Attack         History of Present Illness:   Anxiety and panic attack-patient has been anxious all through her life but recently she has noticed that the frequency of the episodes has increased  This morning she had a panic attack and got really concerned  Before the panic attack she had spoken to her daughter over the phone  Her daughter is 36years old and does not work  That bothers her a lot and may be the underlying precipitant  She does go to the therapist off and on  She is presently not taking any medication for anxiety  In the past he she was taking Zoloft and hydroxyzine but she did not think that it was helping her a lot    The I did try to prescribe the paroxetine and the BuSpar but both of them react to the Azilect that she is taking for the Parkinson's disease  Active Problem List:  Patient Active Problem List   Diagnosis    Parkinson's disease ([address] )    Scoliosis    Low back pain    Senile osteoporosis    Acquired hypothyroidism    Patient refuses to disclose advance directive information    Elevated antinuclear antibody (RAS) level    Family hx-breast malignancy    Mixed hyperlipidemia    Rosacea    Trochanteric bursitis    Urinary incontinence    Vitamin D deficiency    Irritable bowel syndrome with both constipation and diarrhea    REM behavioral disorder    Anxiety         Past Medical History:  Past Medical History:   Diagnosis Date    Anxiety     Disease of thyroid gland     Generalized anxiety disorder 4/23/2015    Hepatic disease     Infectious viral hepatitis     Liver disease     Osteoporosis     Rosacea     Shortness of breath          Past Surgical History:  Past Surgical History:   Procedure Laterality Date    FOOT SURGERY Bilateral     HYSTERECTOMY      PA ESOPHAGOGASTRODUODENOSCOPY TRANSORAL DIAGNOSTIC N/A 9/5/2018    Procedure: EGD AND COLONOSCOPY;  Surgeon: Ruth Jacobs MD;  Location: [facility];   Service: Gastroenterology    URETHRA SURGERY      laparoscopic urethral suspension for stress incontinence         Family History:  Family History   Problem Relation Age of Onset    Lung cancer Mother     Dementia Mother     Cancer Mother     Lung cancer Father     Cancer Father     Esophageal cancer Brother     Cancer Brother     Cavazos's esophagus Brother     Esophageal cancer Family     Heart disease Family     Stroke Family         syndrome         Social History:  Social History     Socioeconomic History    Marital status: Significant Other     Spouse name: None    Number of children: None    Years of education: Master's Degree    Highest education level: None Occupational History     Comment: Retired   Social Needs    Financial resource strain: None    Food insecurity:     Worry: None     Inability: None    Transportation needs:     Medical: None     Non-medical: None   Tobacco Use    Smoking status: Never Smoker    Smokeless tobacco: Never Used   Substance and Sexual Activity    Alcohol use: Yes     Alcohol/week: 0 6 oz     Types: 1 Glasses of wine per week     Comment: Alcohol use per [employer]    Drug use: No    Sexual activity: Never   Lifestyle    Physical activity:     Days per week: None     Minutes per session: None    Stress: None   Relationships    Social connections:     Talks on phone: None     Gets together: None     Attends Quaker service: None     Active member of club or organization: None     Attends meetings of clubs or organizations: None     Relationship status: None    Intimate partner violence:     Fear of current or ex partner: None     Emotionally abused: None     Physically abused: None     Forced sexual activity: None   Other Topics Concern    None   Social History Narrative    Denied:  History of caffeine use         Allergies:   Allergies   Allergen Reactions    Simvastatin Other (See Comments)     Jaundice    Dust Mite Extract     Pollen Extract     Statins          Medications:    Current Outpatient Medications:     aspirin (ECOTRIN LOW STRENGTH) 81 mg EC tablet, Take 81 mg by mouth daily, Disp: , Rfl:     carbidopa-levodopa (SINEMET)  mg per tablet, Take 1 5 tabs QID (Patient taking differently: 1 tablet 4 (four) times a day Take 1 5 tabs QID ), Disp: 180 tablet, Rfl: 5    Cholecalciferol (VITAMIN D) 2000 units tablet, 1 daily, Disp: , Rfl:     Cranberry 1000 MG CAPS, Take 4,200 mg by mouth, Disp: , Rfl:     Homeopathic Products (PROSACEA) GEL, Apply topically, Disp: , Rfl:     levothyroxine 50 mcg tablet, Take 1 tablet (50 mcg total) by mouth daily, Disp: 90 tablet, Rfl: 2    Multiple Vitamin (MULTIVITAMINS PO), daily, Disp: , Rfl:     NEUPRO 4 MG/24HR, APPLY 1 PATCH TOPICALLY ONCE DAILY, Disp: 30 patch, Rfl: 5    polyethylene glycol (MIRALAX) 17 g packet, Take 17 g by mouth daily, Disp: , Rfl:     Probiotic Product (ALIGN) CHEW, Chew daily , Disp: , Rfl:     rasagiline (AZILECT) 1 MG, TAKE 1 TABLET BY MOUTH ONCE DAILY, Disp: 30 tablet, Rfl: 3    Wheat Dextrin (BENEFIBER DRINK MIX PO), Take by mouth, Disp: , Rfl:     omeprazole (PriLOSEC) 20 mg delayed release capsule, Take 1 capsule (20 mg total) by mouth daily (Patient not taking: Reported on 2/11/2019), Disp: 30 capsule, Rfl: 2    sertraline (ZOLOFT) 50 mg tablet, Take 1 tablet (50 mg total) by mouth daily, Disp: 90 tablet, Rfl: 1      The following portions of the patient's history were reviewed and updated as appropriate: past medical history, past surgical history, family history, social history, allergies, current medications and active problem list       Review of Systems:  Constitutional: Denies fever, chills, weight gain, weight loss, fatigue  Eyes: Denies eye redness, eye discharge, double vision, change in visual acuity  ENT: Denies hearing loss, tinnitus, sneezing, nasal congestion, nasal discharge, sore throat   Respiratory: Denies cough, expectoration, hemoptysis, shortness of breath, wheezing  Cardiovascular: Denies chest pain, palpitations, lower extremity swelling, orthopnea, PND  Gastrointestinal: Denies abdominal pain, heartburn, nausea, vomiting, hematemesis, diarrhea, bloody stools  Genito-Urinary: Denies dysuria, frequency, difficulty in micturition, nocturia, incontinence  Musculoskeletal: Denies back pain, joint pain, muscle pain  Neurologic: Denies confusion, lightheadedness, syncope, headache, focal weakness, sensory changes, seizures  Endocrine: Denies polyuria, polydipsia, temperature intolerance  Allergy and Immunology: Denies hives, insect bite sensitivity  Hematological and Lymphatic: Denies bleeding problems, swollen glands   Psychological:  Complains of anxiety, panic  Dermatological: Denies pruritus, rash, skin lesion changes      Vitals:  Vitals:    02/11/19 1401   BP: 96/60   Pulse: 80   SpO2: 92%       Body mass index is 25 21 kg/m²  Weight (last 2 days)     Date/Time   Weight    02/11/19 1401   60 5 (133 4)                Physical Examination:  General: Patient is not in acute distress  Awake, alert, responding to commands  No weight gain or loss  Head: Normocephalic  Atraumatic  Eyes: Conjunctiva and lids with no swelling, erythema or discharge  Both pupils normal sized, round and reactive to light  Sclera nonicteric  ENT: External examination of nose and ear normal  Otoscopic examination shows translucent tympanic membranes with patent canals without erythema  Oropharynx moist with no erythema, edema, exudate or lesions  Neck: Supple  JVP not raised  Trachea midline  No masses  No thyromegaly  Lungs: No signs of increased work of breathing or respiratory distress  Bilateral bronchovascular breath sounds with no crackles or rhonchi  Chest wall: No tenderness  Cardiovascular: Normal PMI  No thrills  Regular rate and rhythm  S1 and S2 normal  No murmur, rub or gallop  Gastrointestinal: Abdomen soft, nontender  No guarding or rigidity  Liver and spleen not palpable  Bowel sounds present  Neurologic: Cranial nerves II-XII intact   Cortical functions normal  Motor system - Reflexes 2+ and symmetrical  Sensations normal  Musculoskeletal: Gait normal  No joint tenderness  Integumentary: Skin normal with no rash or lesions  Lymphatic: No palpable lymph nodes in neck, axilla or groin  Extremities: No clubbing, cyanosis, edema or varicosities  Psychological: Judgement and insight normal   Very anxious      Laboratory Results:  CBC with diff:   Lab Results   Component Value Date    WBC 8 36 10/17/2018    RBC 4 86 10/17/2018    HGB 14 4 10/17/2018    HCT 45 1 10/17/2018    MCV 93 10/17/2018    MCH 29 6 10/17/2018    RDW 13 3 10/17/2018     10/17/2018       CMP:  Lab Results   Component Value Date    CREATININE 0 79 10/17/2018    BUN 16 10/17/2018    K 4 0 10/17/2018     10/17/2018    CO2 28 10/17/2018    ALKPHOS 70 10/17/2018    ALT 18 10/17/2018    AST 18 10/17/2018       Lab Results   Component Value Date    MG 2 4 07/25/2018       No results found for: TROPONINI, CKMB, CKTOTAL    Lipid Profile:   No results found for: CHOL  Lab Results   Component Value Date    HDL 51 10/17/2018     Lab Results   Component Value Date    LDLCALC 123 (H) 10/17/2018     Lab Results   Component Value Date    TRIG 127 10/17/2018         Health Maintenance:  Health Maintenance   Topic Date Due    Hepatitis C Screening  1945    Medicare Annual Wellness Visit (AWV)  1945    BMI: Followup Plan  02/23/1964    Fall Risk  08/14/2019    Depression Screening PHQ  08/14/2019    Urinary Incontinence Screening  08/14/2019    BMI: Adult  11/27/2019    DTaP,Tdap,and Td Vaccines (2 - Td) 10/25/2022    CRC Screening: Colonoscopy  09/05/2028    INFLUENZA VACCINE  Completed    Pneumococcal PPSV23/PCV13 65+ Years / Low and Medium Risk  Completed    HEPATITIS B VACCINES  Aged Out     Immunization History   Administered Date(s) Administered    H1N1 Inj 12/06/2009    H1N1, All Formulations 12/06/2009, 12/14/2009    Hep A, adult 06/06/1996, 05/22/1997    INFLUENZA 12/04/2006, 11/07/2007, 10/03/2008, 02/12/2010, 09/15/2010, 09/21/2011, 10/25/2012, 11/01/2013, 10/15/2014, 10/05/2015, 10/02/2016, 11/13/2017, 09/18/2018    Influenza TIV (IM) 12/04/2006, 11/07/2007, 10/03/2008, 02/12/2010, 09/15/2010, 09/21/2011, 10/25/2012, 11/01/2013, 10/15/2014    Pneumococcal Conjugate 13-Valent 05/03/2016    Pneumococcal Polysaccharide PPV23 03/17/2011    Td (adult), adsorbed 06/10/2002    Tdap 10/25/2012    Zoster 09/12/2012         Kamilah Magaña MD  2/11/2019,2:32 PM

## 2019-02-26 ENCOUNTER — OFFICE VISIT (OUTPATIENT)
Dept: INTERNAL MEDICINE CLINIC | Facility: CLINIC | Age: 74
End: 2019-02-26
Payer: COMMERCIAL

## 2019-02-26 VITALS
TEMPERATURE: 97.9 F | SYSTOLIC BLOOD PRESSURE: 120 MMHG | OXYGEN SATURATION: 97 % | WEIGHT: 133.6 LBS | HEIGHT: 61 IN | HEART RATE: 75 BPM | DIASTOLIC BLOOD PRESSURE: 84 MMHG | BODY MASS INDEX: 25.22 KG/M2

## 2019-02-26 DIAGNOSIS — M54.50 ACUTE RIGHT-SIDED LOW BACK PAIN WITHOUT SCIATICA: Primary | ICD-10-CM

## 2019-02-26 PROCEDURE — 1160F RVW MEDS BY RX/DR IN RCRD: CPT | Performed by: PHYSICIAN ASSISTANT

## 2019-02-26 PROCEDURE — 99214 OFFICE O/P EST MOD 30 MIN: CPT | Performed by: PHYSICIAN ASSISTANT

## 2019-02-26 PROCEDURE — 1036F TOBACCO NON-USER: CPT | Performed by: PHYSICIAN ASSISTANT

## 2019-02-26 PROCEDURE — 3008F BODY MASS INDEX DOCD: CPT | Performed by: PHYSICIAN ASSISTANT

## 2019-02-26 RX ORDER — CYCLOBENZAPRINE HCL 10 MG
10 TABLET ORAL 3 TIMES DAILY PRN
Qty: 30 TABLET | Refills: 0 | Status: SHIPPED | OUTPATIENT
Start: 2019-02-26 | End: 2019-04-01 | Stop reason: ALTCHOICE

## 2019-02-26 RX ORDER — LEVOTHYROXINE SODIUM 0.05 MG/1
50 TABLET ORAL DAILY
Qty: 90 TABLET | Refills: 2 | Status: CANCELLED | OUTPATIENT
Start: 2019-02-26

## 2019-02-26 NOTE — PATIENT INSTRUCTIONS
1- Ibuprofen 400-600mg (2-3 pills) 3 times a day for 5 days, TAKE WITH FOOD  2-Cylcobenzaprine 10mg 3 times a day as needed    3- Heat to the back 2-3 times a day for 30 min for 5 days  4- sleep with a pillow between your knees

## 2019-02-26 NOTE — PROGRESS NOTES
Assessment/Plan:    Right-sided low back pain-this is likely musculoskeletal   The patient is given a regimen of anti-inflammatories such as ibuprofen 3 times a day with food for 5 days  A muscle relaxer is added to be taken as needed, sedation is cautioned  Apply heat to the area 2 to 3 times a day for 30 minutes at a time  Notify the office if you do not improve    No problem-specific Assessment & Plan notes found for this encounter  Diagnoses and all orders for this visit:    Acute right-sided low back pain without sciatica  -     cyclobenzaprine (FLEXERIL) 10 mg tablet; Take 1 tablet (10 mg total) by mouth 3 (three) times a day as needed for muscle spasms    Thyroid disorder    Other orders  -     Cancel: levothyroxine 50 mcg tablet; Take 1 tablet (50 mcg total) by mouth daily          Subjective:      Patient ID: Anmol Martino is a 69 y o  female  Patient comes in complaining of back pain  She said yesterday she reached under her sink to get her trash can out which she has done thousands of times, and she felt a sharp pain in her right lower back  She said she has had back pain in the past from time to time but it was never this severe  It is located in the right lower back to the right of the sacral spine  There is no radiation, there are no paresthesias  There is no other activity, fall, injury or trauma to explain the pain  The patient says she took an aspirin 325 mg yesterday, 1 this morning and 1 before she came to the office  She said this seems to be helping a little bit  The following portions of the patient's history were reviewed and updated as appropriate: allergies, current medications, past medical history, past social history and problem list     Review of Systems   Constitutional: Negative for chills, fatigue and fever  HENT: Negative for congestion  Respiratory: Negative for cough and shortness of breath      Cardiovascular: Negative for chest pain and palpitations  Gastrointestinal: Negative for abdominal pain, diarrhea and nausea  Musculoskeletal: Positive for back pain  Negative for gait problem  Objective:      /84 (BP Location: Left arm, Patient Position: Sitting)   Pulse 75   Temp 97 9 °F (36 6 °C)   Ht 5' 1" (1 549 m)   Wt 60 6 kg (133 lb 9 6 oz) Comment: shoes on  SpO2 97%   BMI 25 24 kg/m²          Physical Exam   Constitutional: She appears well-developed and well-nourished  Cardiovascular: Normal rate, regular rhythm and normal heart sounds  Pulmonary/Chest: Effort normal and breath sounds normal    Abdominal: Soft  Bowel sounds are normal    Musculoskeletal: Normal range of motion  She exhibits no tenderness  Lumbar back: She exhibits pain and spasm  She exhibits normal range of motion, no tenderness and no swelling     Scoliosis is present, this is chronic

## 2019-03-14 DIAGNOSIS — G20 PARKINSON'S DISEASE (HCC): ICD-10-CM

## 2019-04-01 ENCOUNTER — OFFICE VISIT (OUTPATIENT)
Dept: NEUROLOGY | Facility: CLINIC | Age: 74
End: 2019-04-01
Payer: COMMERCIAL

## 2019-04-01 VITALS
SYSTOLIC BLOOD PRESSURE: 118 MMHG | WEIGHT: 133 LBS | HEART RATE: 83 BPM | BODY MASS INDEX: 25.11 KG/M2 | HEIGHT: 61 IN | DIASTOLIC BLOOD PRESSURE: 74 MMHG

## 2019-04-01 DIAGNOSIS — G20 PARKINSON'S DISEASE (HCC): Primary | ICD-10-CM

## 2019-04-01 DIAGNOSIS — G47.52 REM BEHAVIORAL DISORDER: ICD-10-CM

## 2019-04-01 DIAGNOSIS — F41.9 ANXIETY: ICD-10-CM

## 2019-04-01 PROCEDURE — 99214 OFFICE O/P EST MOD 30 MIN: CPT | Performed by: PSYCHIATRY & NEUROLOGY

## 2019-04-24 ENCOUNTER — OFFICE VISIT (OUTPATIENT)
Dept: INTERNAL MEDICINE CLINIC | Facility: CLINIC | Age: 74
End: 2019-04-24
Payer: COMMERCIAL

## 2019-04-24 VITALS
BODY MASS INDEX: 25.19 KG/M2 | DIASTOLIC BLOOD PRESSURE: 72 MMHG | WEIGHT: 133.4 LBS | OXYGEN SATURATION: 97 % | SYSTOLIC BLOOD PRESSURE: 100 MMHG | HEART RATE: 78 BPM | HEIGHT: 61 IN

## 2019-04-24 DIAGNOSIS — M54.50 CHRONIC BILATERAL LOW BACK PAIN WITHOUT SCIATICA: ICD-10-CM

## 2019-04-24 DIAGNOSIS — G20 PARKINSON'S DISEASE (HCC): ICD-10-CM

## 2019-04-24 DIAGNOSIS — N39.46 MIXED STRESS AND URGE URINARY INCONTINENCE: ICD-10-CM

## 2019-04-24 DIAGNOSIS — F41.9 ANXIETY: ICD-10-CM

## 2019-04-24 DIAGNOSIS — E03.9 ACQUIRED HYPOTHYROIDISM: ICD-10-CM

## 2019-04-24 DIAGNOSIS — M81.0 SENILE OSTEOPOROSIS: ICD-10-CM

## 2019-04-24 DIAGNOSIS — E55.9 VITAMIN D DEFICIENCY: ICD-10-CM

## 2019-04-24 DIAGNOSIS — G89.29 CHRONIC BILATERAL LOW BACK PAIN WITHOUT SCIATICA: ICD-10-CM

## 2019-04-24 DIAGNOSIS — E78.2 MIXED HYPERLIPIDEMIA: Primary | ICD-10-CM

## 2019-04-24 PROCEDURE — 99214 OFFICE O/P EST MOD 30 MIN: CPT | Performed by: INTERNAL MEDICINE

## 2019-04-25 ENCOUNTER — OFFICE VISIT (OUTPATIENT)
Dept: INTERNAL MEDICINE CLINIC | Facility: CLINIC | Age: 74
End: 2019-04-25
Payer: COMMERCIAL

## 2019-04-25 ENCOUNTER — TELEPHONE (OUTPATIENT)
Dept: INTERNAL MEDICINE CLINIC | Facility: CLINIC | Age: 74
End: 2019-04-25

## 2019-04-25 ENCOUNTER — APPOINTMENT (OUTPATIENT)
Dept: LAB | Facility: CLINIC | Age: 74
End: 2019-04-25
Payer: COMMERCIAL

## 2019-04-25 VITALS
HEIGHT: 61 IN | OXYGEN SATURATION: 97 % | TEMPERATURE: 97.7 F | HEART RATE: 75 BPM | WEIGHT: 136.4 LBS | SYSTOLIC BLOOD PRESSURE: 108 MMHG | BODY MASS INDEX: 25.75 KG/M2 | DIASTOLIC BLOOD PRESSURE: 70 MMHG
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
## 2019-04-25 DIAGNOSIS — E03.9 ACQUIRED HYPOTHYROIDISM: Primary | ICD-10-CM

## 2019-04-25 DIAGNOSIS — E03.9 ACQUIRED HYPOTHYROIDISM: ICD-10-CM

## 2019-04-25 DIAGNOSIS — E78.2 MIXED HYPERLIPIDEMIA: ICD-10-CM

## 2019-04-25 DIAGNOSIS — E55.9 VITAMIN D DEFICIENCY: ICD-10-CM

## 2019-04-25 DIAGNOSIS — G47.52 REM BEHAVIORAL DISORDER: ICD-10-CM

## 2019-04-25 LAB
25(OH)D3 SERPL-MCNC: 42.3 NG/ML (ref 30–100)
ALBUMIN SERPL BCP-MCNC: 3.8 G/DL (ref 3.5–5)
ALP SERPL-CCNC: 63 U/L (ref 46–116)
ALT SERPL W P-5'-P-CCNC: 8 U/L (ref 12–78)
ANION GAP SERPL CALCULATED.3IONS-SCNC: 5 MMOL/L (ref 4–13)
AST SERPL W P-5'-P-CCNC: 15 U/L (ref 5–45)
BASOPHILS # BLD AUTO: 0.08 THOUSANDS/ΜL (ref 0–0.1)
BASOPHILS NFR BLD AUTO: 1 % (ref 0–1)
BILIRUB SERPL-MCNC: 0.65 MG/DL (ref 0.2–1)
BUN SERPL-MCNC: 20 MG/DL (ref 5–25)
CALCIUM SERPL-MCNC: 8.2 MG/DL (ref 8.3–10.1)
CHLORIDE SERPL-SCNC: 106 MMOL/L (ref 100–108)
CHOLEST SERPL-MCNC: 226 MG/DL (ref 50–200)
CO2 SERPL-SCNC: 28 MMOL/L (ref 21–32)
CREAT SERPL-MCNC: 0.81 MG/DL (ref 0.6–1.3)
EOSINOPHIL # BLD AUTO: 0.42 THOUSAND/ΜL (ref 0–0.61)
EOSINOPHIL NFR BLD AUTO: 5 % (ref 0–6)
ERYTHROCYTE [DISTWIDTH] IN BLOOD BY AUTOMATED COUNT: 13.6 % (ref 11.6–15.1)
GFR SERPL CREATININE-BSD FRML MDRD: 72 ML/MIN/1.73SQ M
GLUCOSE P FAST SERPL-MCNC: 89 MG/DL (ref 65–99)
HCT VFR BLD AUTO: 44.4 % (ref 34.8–46.1)
HDLC SERPL-MCNC: 63 MG/DL (ref 40–60)
HGB BLD-MCNC: 14.2 G/DL (ref 11.5–15.4)
IMM GRANULOCYTES # BLD AUTO: 0.04 THOUSAND/UL (ref 0–0.2)
IMM GRANULOCYTES NFR BLD AUTO: 1 % (ref 0–2)
LDLC SERPL CALC-MCNC: 143 MG/DL (ref 0–100)
LYMPHOCYTES # BLD AUTO: 2.07 THOUSANDS/ΜL (ref 0.6–4.47)
LYMPHOCYTES NFR BLD AUTO: 24 % (ref 14–44)
MCH RBC QN AUTO: 29.7 PG (ref 26.8–34.3)
MCHC RBC AUTO-ENTMCNC: 32 G/DL (ref 31.4–37.4)
MCV RBC AUTO: 93 FL (ref 82–98)
MONOCYTES # BLD AUTO: 0.65 THOUSAND/ΜL (ref 0.17–1.22)
MONOCYTES NFR BLD AUTO: 7 % (ref 4–12)
NEUTROPHILS # BLD AUTO: 5.55 THOUSANDS/ΜL (ref 1.85–7.62)
NEUTS SEG NFR BLD AUTO: 62 % (ref 43–75)
NONHDLC SERPL-MCNC: 163 MG/DL
NRBC BLD AUTO-RTO: 0 /100 WBCS
PLATELET # BLD AUTO: 229 THOUSANDS/UL (ref 149–390)
PMV BLD AUTO: 11 FL (ref 8.9–12.7)
POTASSIUM SERPL-SCNC: 4.2 MMOL/L (ref 3.5–5.3)
PROT SERPL-MCNC: 7 G/DL (ref 6.4–8.2)
RBC # BLD AUTO: 4.78 MILLION/UL (ref 3.81–5.12)
SODIUM SERPL-SCNC: 139 MMOL/L (ref 136–145)
TRIGL SERPL-MCNC: 98 MG/DL
TSH SERPL DL<=0.05 MIU/L-ACNC: 1.18 UIU/ML (ref 0.36–3.74)
WBC # BLD AUTO: 8.81 THOUSAND/UL (ref 4.31–10.16)

## 2019-04-25 PROCEDURE — 82306 VITAMIN D 25 HYDROXY: CPT

## 2019-04-25 PROCEDURE — 84443 ASSAY THYROID STIM HORMONE: CPT

## 2019-04-25 PROCEDURE — 80061 LIPID PANEL: CPT

## 2019-04-25 PROCEDURE — 85025 COMPLETE CBC W/AUTO DIFF WBC: CPT

## 2019-04-25 PROCEDURE — 80053 COMPREHEN METABOLIC PANEL: CPT

## 2019-04-25 PROCEDURE — 99213 OFFICE O/P EST LOW 20 MIN: CPT | Performed by: PHYSICIAN ASSISTANT

## 2019-04-25 PROCEDURE — 36415 COLL VENOUS BLD VENIPUNCTURE: CPT

## 2019-04-25 RX ORDER — DOXYCYCLINE HYCLATE 100 MG/1
100 CAPSULE ORAL EVERY 12 HOURS SCHEDULED
Qty: 2 CAPSULE | Refills: 0 | Status: SHIPPED | OUTPATIENT
Start: 2019-04-25 | End: 2019-04-26

## 2019-05-06 ENCOUNTER — TELEPHONE (OUTPATIENT)
Dept: INTERNAL MEDICINE CLINIC | Facility: CLINIC | Age: 74
End: 2019-05-06
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
## 2019-05-06 RX ORDER — DOXYCYCLINE HYCLATE 100 MG/1
100 CAPSULE ORAL EVERY 12 HOURS SCHEDULED
Qty: 42 CAPSULE | Refills: 0 | Status: SHIPPED | OUTPATIENT
Start: 2019-05-06 | End: 2019-05-22

## 2019-05-07 DIAGNOSIS — G20 PARKINSON'S DISEASE (HCC): ICD-10-CM

## 2019-05-07 RX ORDER — RASAGILINE 1 MG/1
TABLET ORAL
Qty: 30 TABLET | Refills: 3 | Status: SHIPPED | OUTPATIENT
Start: 2019-05-07 | End: 2019-08-30 | Stop reason: SDUPTHER

## 2019-05-22 ENCOUNTER — OFFICE VISIT (OUTPATIENT)
Dept: INTERNAL MEDICINE CLINIC | Facility: CLINIC | Age: 74
End: 2019-05-22
Payer: COMMERCIAL

## 2019-05-22 VITALS
SYSTOLIC BLOOD PRESSURE: 116 MMHG | OXYGEN SATURATION: 97 % | BODY MASS INDEX: 25.04 KG/M2 | HEIGHT: 61 IN | HEART RATE: 84 BPM | WEIGHT: 132.6 LBS | DIASTOLIC BLOOD PRESSURE: 94 MMHG

## 2019-05-22 DIAGNOSIS — G20 PARKINSON'S DISEASE (HCC): ICD-10-CM

## 2019-05-22 DIAGNOSIS — E78.2 MIXED HYPERLIPIDEMIA: ICD-10-CM

## 2019-05-22 DIAGNOSIS — E03.9 ACQUIRED HYPOTHYROIDISM: Primary | ICD-10-CM

## 2019-05-22 DIAGNOSIS — K58.2 IRRITABLE BOWEL SYNDROME WITH BOTH CONSTIPATION AND DIARRHEA: ICD-10-CM

## 2019-05-22 PROCEDURE — 99214 OFFICE O/P EST MOD 30 MIN: CPT | Performed by: INTERNAL MEDICINE

## 2019-05-22 PROCEDURE — 1101F PT FALLS ASSESS-DOCD LE1/YR: CPT | Performed by: INTERNAL MEDICINE

## 2019-05-22 PROCEDURE — 3725F SCREEN DEPRESSION PERFORMED: CPT | Performed by: INTERNAL MEDICINE

## 2019-05-22 RX ORDER — LANOLIN ALCOHOL/MO/W.PET/CERES
1000 CREAM (GRAM) TOPICAL DAILY
COMMUNITY
End: 2022-02-08 | Stop reason: ALTCHOICE

## 2019-06-06 ENCOUNTER — TELEPHONE (OUTPATIENT)
Dept: INTERNAL MEDICINE CLINIC | Facility: CLINIC | Age: 74
End: 2019-06-06

## 2019-06-10 ENCOUNTER — OFFICE VISIT (OUTPATIENT)
Dept: INTERNAL MEDICINE CLINIC | Facility: CLINIC | Age: 74
End: 2019-06-10
Payer: COMMERCIAL

## 2019-06-10 ENCOUNTER — APPOINTMENT (OUTPATIENT)
Dept: LAB | Facility: CLINIC | Age: 74
End: 2019-06-10
Payer: COMMERCIAL

## 2019-06-10 VITALS
BODY MASS INDEX: 25.64 KG/M2 | HEIGHT: 61 IN | WEIGHT: 135.8 LBS | OXYGEN SATURATION: 98 % | DIASTOLIC BLOOD PRESSURE: 60 MMHG | SYSTOLIC BLOOD PRESSURE: 96 MMHG | HEART RATE: 84 BPM | TEMPERATURE: 96.6 F

## 2019-06-10 DIAGNOSIS — L23.7 POISON IVY DERMATITIS: Primary | ICD-10-CM
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
## 2019-06-10 PROCEDURE — 86618 LYME DISEASE ANTIBODY: CPT

## 2019-06-10 PROCEDURE — 1036F TOBACCO NON-USER: CPT | Performed by: INTERNAL MEDICINE

## 2019-06-10 PROCEDURE — 1160F RVW MEDS BY RX/DR IN RCRD: CPT | Performed by: INTERNAL MEDICINE

## 2019-06-10 PROCEDURE — 3008F BODY MASS INDEX DOCD: CPT | Performed by: INTERNAL MEDICINE

## 2019-06-10 PROCEDURE — 36415 COLL VENOUS BLD VENIPUNCTURE: CPT

## 2019-06-10 PROCEDURE — 99213 OFFICE O/P EST LOW 20 MIN: CPT | Performed by: INTERNAL MEDICINE

## 2019-06-10 RX ORDER — PREDNISONE 10 MG/1
TABLET ORAL
Qty: 30 TABLET | Refills: 0 | Status: SHIPPED | OUTPATIENT
Start: 2019-06-10 | End: 2019-09-05 | Stop reason: SDUPTHER

## 2019-06-12 ENCOUNTER — TELEPHONE (OUTPATIENT)
Dept: INTERNAL MEDICINE CLINIC | Facility: CLINIC | Age: 74
End: 2019-06-12

## 2019-06-12 LAB
B BURGDOR IGG SER IA-ACNC: 0.14
B BURGDOR IGM SER IA-ACNC: 0.11

## 2019-07-23 DIAGNOSIS — G20 PARKINSON'S DISEASE (HCC): ICD-10-CM

## 2019-07-23 RX ORDER — ROTIGOTINE 4 MG/24H
PATCH, EXTENDED RELEASE TRANSDERMAL
Qty: 30 PATCH | Refills: 5 | Status: SHIPPED | OUTPATIENT
Start: 2019-07-23 | End: 2020-01-31 | Stop reason: SDUPTHER

## 2019-08-19 ENCOUNTER — APPOINTMENT (OUTPATIENT)
Dept: RADIOLOGY | Facility: CLINIC | Age: 74
End: 2019-08-19
Payer: COMMERCIAL

## 2019-08-19 ENCOUNTER — OFFICE VISIT (OUTPATIENT)
Dept: INTERNAL MEDICINE CLINIC | Facility: CLINIC | Age: 74
End: 2019-08-19
Payer: COMMERCIAL

## 2019-08-19 VITALS
DIASTOLIC BLOOD PRESSURE: 72 MMHG | HEIGHT: 61 IN | SYSTOLIC BLOOD PRESSURE: 108 MMHG | BODY MASS INDEX: 25.68 KG/M2 | WEIGHT: 136 LBS | HEART RATE: 78 BPM | OXYGEN SATURATION: 96 %

## 2019-08-19 DIAGNOSIS — R07.81 RIB PAIN ON RIGHT SIDE: Primary | ICD-10-CM

## 2019-08-19 DIAGNOSIS — R07.81 RIB PAIN ON RIGHT SIDE: ICD-10-CM

## 2019-08-19 PROCEDURE — 3008F BODY MASS INDEX DOCD: CPT | Performed by: INTERNAL MEDICINE

## 2019-08-19 PROCEDURE — 1036F TOBACCO NON-USER: CPT | Performed by: INTERNAL MEDICINE

## 2019-08-19 PROCEDURE — 71101 X-RAY EXAM UNILAT RIBS/CHEST: CPT

## 2019-08-19 PROCEDURE — 1160F RVW MEDS BY RX/DR IN RCRD: CPT | Performed by: INTERNAL MEDICINE

## 2019-08-19 PROCEDURE — 99213 OFFICE O/P EST LOW 20 MIN: CPT | Performed by: INTERNAL MEDICINE

## 2019-08-19 RX ORDER — NAPROXEN 500 MG/1
500 TABLET ORAL 2 TIMES DAILY WITH MEALS
Qty: 14 TABLET | Refills: 0 | Status: SHIPPED | OUTPATIENT
Start: 2019-08-19 | End: 2019-09-12 | Stop reason: CLARIF

## 2019-08-19 NOTE — PROGRESS NOTES
INTERNAL MEDICINE FOLLOW-UP OFFICE VISIT  [facility]    NAME: Ching Chew  AGE: 69 y o  SEX: female  : 1945   MRN: [id number]    DATE: 2019  TIME: 11:30 AM    Assessment and Plan     Diagnoses and all orders for this visit:    Rib pain on right side  -     XR ribs right w pa chest min 3 views; Future  -     naproxen (NAPROSYN) 500 mg tablet; Take 1 tablet (500 mg total) by mouth 2 (two) times a day with meals for 7 days      She was advised to apply ice on the ribs and to do deep breathing exercises  - Counseling Documentation: patient was counseled regarding: instructions for management, risk factor reductions, prognosis, patient and family education and impressions  - Medication Side Effects: Adverse side effects of medications were reviewed with the patient/guardian today  Return to office in: as needed    Chief Complaint     Chief Complaint   Patient presents with    Fall       History of Present Illness     HPI    Patient fell 2 days ago while she was playing golf and injured her right ribs and both the knees  She has bruises on them and complains of pain in the area of the right ribs  She does not complain of any difficulty breathing  The following portions of the patient's history were reviewed and updated as appropriate: allergies, current medications, past family history, past medical history, past social history, past surgical history and problem list     Review of Systems     Review of Systems   Constitutional: Negative for chills, diaphoresis, fatigue and fever  HENT: Negative for congestion, ear discharge, ear pain, hearing loss, postnasal drip, rhinorrhea, sinus pressure, sinus pain, sneezing, sore throat and voice change  Eyes: Negative for pain, discharge, redness and visual disturbance  Respiratory: Negative for cough, chest tightness, shortness of breath and wheezing      Cardiovascular: Negative for chest pain, palpitations and leg swelling  Gastrointestinal: Negative for abdominal distention, abdominal pain, blood in stool, constipation, diarrhea, nausea and vomiting  Endocrine: Negative for cold intolerance, heat intolerance, polydipsia, polyphagia and polyuria  Genitourinary: Negative for dysuria, flank pain, frequency, hematuria and urgency  Musculoskeletal: Negative for arthralgias, back pain, gait problem, joint swelling, myalgias, neck pain and neck stiffness  Complains of pain in the right ribs   Skin: Negative for rash  Neurological: Negative for dizziness, tremors, syncope, facial asymmetry, speech difficulty, weakness, light-headedness, numbness and headaches  Hematological: Does not bruise/bleed easily  Psychiatric/Behavioral: Negative for behavioral problems, confusion and sleep disturbance  The patient is not nervous/anxious  Active Problem List     Patient Active Problem List   Diagnosis    [address]'s [address] [address]    Scoliosis    Low back pain    Senile osteoporosis    Acquired hypothyroidism    Patient refuses to disclose advance directive information    Elevated antinuclear antibody (RAS) level    Family hx-breast malignancy    Mixed hyperlipidemia    Rosacea    Trochanteric bursitis    Urinary incontinence    Vitamin D deficiency    Irritable bowel syndrome with both constipation and diarrhea    REM behavioral disorder    Anxiety    Rib pain on right side       Objective     /72 (BP Location: Left arm, Patient Position: Sitting, Cuff Size: Standard)   Pulse 78   Ht 5' 1" (1 549 m)   Wt 61 7 kg (136 lb)   SpO2 96%   BMI 25 70 kg/m²     Physical Exam   Constitutional: She is oriented to person, place, and time  She appears well-developed and well-nourished  No distress  HENT:   Head: Normocephalic and atraumatic     Right Ear: External ear normal    Left Ear: External ear normal    Nose: Nose normal    Mouth/Throat: Oropharynx is clear and moist    Eyes: Conjunctivae and EOM are normal  Right eye exhibits no discharge  Left eye exhibits no discharge  No scleral icterus  Neck: Normal range of motion  Neck supple  No JVD present  No tracheal deviation present  No thyromegaly present  Cardiovascular: Normal rate, regular rhythm, normal heart sounds and intact distal pulses  Exam reveals no gallop and no friction rub  No murmur heard  Pulmonary/Chest: Effort normal and breath sounds normal  No respiratory distress  She has no wheezes  She has no rales  She exhibits no tenderness  Abdominal: Soft  Bowel sounds are normal  She exhibits no distension  There is no tenderness  There is no rebound and no guarding  Musculoskeletal: Normal range of motion  She exhibits tenderness  She exhibits no edema  Tenderness present in the right rib area laterally   Lymphadenopathy:     She has no cervical adenopathy  Neurological: She is alert and oriented to person, place, and time  No cranial nerve deficit  She exhibits normal muscle tone  Coordination normal    Skin: Skin is warm and dry  No rash noted  She is not diaphoretic  No erythema  Psychiatric: She has a normal mood and affect   Judgment normal            Current Medications       Current Outpatient Medications:     aspirin (ECOTRIN LOW STRENGTH) 81 mg EC tablet, Take 81 mg by mouth daily, Disp: , Rfl:     carbidopa-levodopa (SINEMET)  mg per tablet, TAKE 1 & 1/2 (ONE & ONE-HALF) TABLETS BY MOUTH 4 TIMES DAILY (Patient taking differently: 2 tablets in the morning, ( 3 hours later) 1 and 1/2 around  ( 3 hours later) 1 and 1/2, and (sometimes) 1 tab at bedtimes), Disp: 180 tablet, Rfl: 5    Cholecalciferol (VITAMIN D) 2000 units tablet, 1 daily, Disp: , Rfl:     Cranberry 1000 MG CAPS, Take 4,200 mg by mouth, Disp: , Rfl:     cyanocobalamin (VITAMIN B-12) 1,000 mcg tablet, Take 1,000 mcg by mouth daily, Disp: , Rfl:     Homeopathic Products (PROSACEA) GEL, Apply topically, Disp: , Rfl:    levothyroxine 50 mcg tablet, Take 1 tablet (50 mcg total) by mouth daily, Disp: 90 tablet, Rfl: 2    Multiple Vitamin (MULTIVITAMINS PO), daily, Disp: , Rfl:     NEUPRO 4 MG/24HR, APPLY 1 PATCH TOPICALLY ONCE DAILY, Disp: 30 patch, Rfl: 5    polyethylene glycol (MIRALAX) 17 g packet, Take 17 g by mouth as needed , Disp: , Rfl:     predniSONE 10 mg tablet, Take 4 tabs daily for 3 days followed by 3 tabs daily for 3 days then 2 tabs daily for 3 days then 1 tab daily for 3 days, Disp: 30 tablet, Rfl: 0    Probiotic Product (ALIGN) CHEW, Chew daily , Disp: , Rfl:     rasagiline (AZILECT) 1 MG, TAKE 1 TABLET BY MOUTH ONCE DAILY, Disp: 30 tablet, Rfl: 3    sertraline (ZOLOFT) 50 mg tablet, Take 1 tablet (50 mg total) by mouth daily, Disp: 90 tablet, Rfl: 1    Wheat Dextrin (BENEFIBER DRINK MIX PO), Take by mouth, Disp: , Rfl:     naproxen (NAPROSYN) 500 mg tablet, Take 1 tablet (500 mg total) by mouth 2 (two) times a day with meals for 7 days, Disp: 14 tablet, Rfl: 0    Health Maintenance     Health Maintenance   Topic Date Due    Hepatitis C Screening  1945    Medicare Annual Wellness Visit (AWV)  1945    INFLUENZA VACCINE  07/01/2019    Fall Risk  05/22/2020    Depression Screening PHQ  05/22/2020    Urinary Incontinence Screening  05/22/2020    BMI: Adult  06/10/2020    DTaP,Tdap,and Td Vaccines (2 - Td) 10/25/2022    CRC Screening: Colonoscopy  09/05/2028    Pneumococcal Vaccine: 65+ Years  Completed    Pneumococcal Vaccine: Pediatrics (0 to 5 Years) and At-Risk Patients (6 to 59 Years)  Aged Out    HEPATITIS B VACCINES  Aged [employer] History   Administered Date(s) Administered    H1N1 Inj 12/06/2009    H1N1, All Formulations 12/14/2009, 12/14/2009    Hep A, adult 06/06/1996, 05/22/1997    INFLUENZA 12/04/2006, 11/07/2007, 10/03/2008, 02/12/2010, 09/15/2010, 09/21/2011, 10/25/2012, 11/01/2013, 10/15/2014, 10/05/2015, 10/02/2016, 11/13/2017, 09/18/2018    Influenza Quadrivalent 3 years and older 10/05/2015    Influenza Quadrivalent 6 mos and older IM 09/18/2018    Influenza TIV (IM) 12/04/2006, 11/07/2007, 10/03/2008, 02/12/2010, 09/15/2010, 09/21/2011, 10/25/2012, 11/01/2013, 10/15/2014    Pneumococcal Conjugate 13-Valent 05/03/2016    Pneumococcal Polysaccharide PPV23 03/17/2011    Td (adult), adsorbed 06/10/2002    Tdap 10/25/2012    Zoster 09/12/2012         Flora Jean MD  [address] [facility]

## 2019-08-26 ENCOUNTER — TELEPHONE (OUTPATIENT)
Dept: INTERNAL MEDICINE CLINIC | Facility: CLINIC | Age: 74
End: 2019-08-26

## 2019-08-26 NOTE — TELEPHONE ENCOUNTER
----- Message from Natan Beltran MD sent at 8/26/2019 12:36 PM EDT -----  No rib fracture seen on x-ray

## 2019-08-30 DIAGNOSIS — G20 PARKINSON'S DISEASE (HCC): ICD-10-CM

## 2019-08-30 RX ORDER — RASAGILINE 1 MG/1
TABLET ORAL
Qty: 30 TABLET | Refills: 3 | Status: SHIPPED | OUTPATIENT
Start: 2019-08-30 | End: 2020-01-21

## 2019-09-05 ENCOUNTER — TELEPHONE (OUTPATIENT)
Dept: INTERNAL MEDICINE CLINIC | Facility: CLINIC | Age: 74
End: 2019-09-05

## 2019-09-05 DIAGNOSIS — L23.7 POISON IVY DERMATITIS: ICD-10-CM

## 2019-09-05 RX ORDER — PREDNISONE 10 MG/1
TABLET ORAL
Qty: 30 TABLET | Refills: 0 | Status: SHIPPED | OUTPATIENT
Start: 2019-09-05 | End: 2019-09-12 | Stop reason: CLARIF

## 2019-09-05 NOTE — TELEPHONE ENCOUNTER
Patient saw Dr Saadia Valle in June for Poison ivy and was prescribed prednisone 10 mg tablet  Asking if script can be renewed because she has it all over her and its spreading and itchy  Please send script to [address] and let her know script has been sent   [phone]

## 2019-09-09 ENCOUNTER — OFFICE VISIT (OUTPATIENT)
Dept: INTERNAL MEDICINE CLINIC | Facility: CLINIC | Age: 74
End: 2019-09-09
Payer: COMMERCIAL

## 2019-09-09 VITALS
WEIGHT: 134.6 LBS | HEART RATE: 73 BPM | DIASTOLIC BLOOD PRESSURE: 80 MMHG | BODY MASS INDEX: 25.43 KG/M2 | OXYGEN SATURATION: 98 % | SYSTOLIC BLOOD PRESSURE: 122 MMHG

## 2019-09-09 DIAGNOSIS — L23.7 POISON IVY DERMATITIS: Primary | ICD-10-CM

## 2019-09-09 PROCEDURE — 99213 OFFICE O/P EST LOW 20 MIN: CPT | Performed by: INTERNAL MEDICINE

## 2019-09-09 RX ORDER — CEPHALEXIN 250 MG/1
250 CAPSULE ORAL EVERY 8 HOURS SCHEDULED
Qty: 21 CAPSULE | Refills: 0 | Status: SHIPPED | OUTPATIENT
Start: 2019-09-09 | End: 2019-09-12 | Stop reason: CLARIF

## 2019-09-09 RX ORDER — TRIAMCINOLONE ACETONIDE 1 MG/G
CREAM TOPICAL 2 TIMES DAILY PRN
Qty: 80 G | Refills: 3 | Status: SHIPPED | OUTPATIENT
Start: 2019-09-09 | End: 2019-10-07 | Stop reason: ALTCHOICE

## 2019-09-09 NOTE — PATIENT INSTRUCTIONS
Poison Ivy   WHAT YOU NEED TO KNOW:   What is poison ivy? Poison ivy is a plant that can cause an itchy, uncomfortable rash on your skin  Poison ivy grows as a shrub or vine in woods, fields, and areas of thick [place]  It has 3 bright green leaves on each stem that turn red in jhon  What causes a poison ivy rash? A poison ivy rash can occur when the plant oil soaks into your skin  You may get a rash if you touch:  · Any part of the poison ivy plant: This includes the leaves, stem, vine, roots, flowers, and berries  · Pets with poison ivy on their fur:  They can spread poison ivy oil to your skin and to items inside your car and house  · Items with poison ivy oil on them: This includes clothing, shoes, camping or sports equipment, or outdoor tools  · A person with poison ivy oil on him:  Poison ivy oil may be on their skin or clothing  What can I do if I have been exposed to poison ivy? If you think you have touched poison ivy, rinse your skin with cool water right away  Then, wash it with soap and water  Rinse your skin well  Do not use hot water because it may cause the oil to spread on your skin  You may also put rubbing alcohol or a solution of 1/2 alcohol and 1/2 water on your skin  This may help your rash to be less severe when it breaks out on your skin  What are the signs and symptoms of a poison ivy rash? · A red, swollen, itchy rash that develops within hours to days of exposure to poison ivy    · A rash that appears in thick patches or thin lines where the plant leaves rubbed against your skin    · Blisters that may leak clear to yellow liquid, then crust over and become scaly  How is a poison ivy rash treated? · Antiseptic or drying creams or ointments:  Your healthcare provider may recommend medicine to dry out the rash and decrease the itching  These products may be available without a doctor's order  · Steroids: This medicine helps decrease itching and inflammation  It can be given as a cream to apply to your skin or as a pill  · Antihistamines: This medicine may help decrease itching and help you sleep  It is available without a doctor's order  How can I manage my symptoms? · Keep your rash clean and dry:  Wash it with soap and water  Gently pat it dry with a clean towel  · Try not to scratch or rub your rash: This can cause your skin to become infected  · Use a compress on your rash:  Dip a clean washcloth in cool water  Wring it out and place it on your rash  Leave the washcloth on your skin for 15 minutes  Do this at least 3 times per day  · Take a cornstarch or oatmeal bath: If your rash is too large to cover with wet washcloths, take 3 or 4 cornstarch baths daily  Mix 1 pound of cornstarch with a little water to make a paste  Add the paste to a tub full of water and mix well  You may also use colloidal oatmeal in the bath water  Use lukewarm water  Avoid hot water because it may cause your itching to increase  Can a poison ivy rash be spread by scratching or touching it? You cannot spread poison ivy by touching your rash or the liquid from your blisters  Poison ivy is spread only if you scratch your skin while it still has oil on it  You may think your rash is spreading because new rashes appear over a number of days  This happens because areas covered by thin skin break out in a rash first  Your face or forearms may develop a rash before thicker areas, such as the palms of your hands  How can I prevent a poison ivy rash? · Wear skin protection:  Wear long pants, a long-sleeved shirt, and gloves  Use a skin block lotion to protect your skin from poison ivy oil  You can find this at a drugstore without a prescription  · Wash clothing after possible exposure: If you think you have been near a poison ivy plant, wash the clothes you were wearing separately from other clothes  Rinse the washing machine well after you take the clothes out   Scrub boots and shoes with warm, soapy water  Dry clean items and clothing that you cannot wash in water  Poison ivy oil is sticky and can stay on surfaces for a long time  It can cause a new rash even years later  · Bathe your pet:  Use warm water and shampoo on your pet's fur  This will prevent the spread of oil to your skin, car, and home  Wear long sleeves, long pants, and gloves while washing pets or any items that may have oil on them  · Reduce exposure to poison ivy:  Do not touch plants that look like poison ivy  Keep your yard free of poison ivy  While protecting your skin, remove the plant and the roots  Place them in a plastic bag and seal the bag tightly  · Do not burn poison ivy plants: This can spread the oil through the air  If you breathe the oil into your lungs, you could have swelling and serious breathing problems  Oil that clings to the fire jamey can land on your skin and cause a rash  When should I contact my healthcare provider? · You have pus, soft yellow scabs, or tenderness on the rash  · The itching gets worse or keeps you awake at night  · The rash covers more than 1/4 of your skin or spreads to your eyes, mouth, or genital area  · The rash is not better after 2 to 3 weeks  · You have tender, swollen glands on the sides of your neck  · You have swelling in your arms and legs  · You have questions or concerns about your condition or care  When should I seek immediate care or call 911? · You have a fever  · You have redness, swelling, and tenderness around the rash  · You have trouble breathing  CARE AGREEMENT:   You have the right to help plan your care  Learn about your health condition and how it may be treated  Discuss treatment options with your caregivers to decide what care you want to receive  You always have the right to refuse treatment  The above information is an  only   It is not intended as medical advice for individual conditions or treatments  Talk to your doctor, nurse or pharmacist before following any medical regimen to see if it is safe and effective for you  © 2017 [address] [employer] Information is for End User's use only and may not be sold, redistributed or otherwise used for commercial purposes  All illustrations and images included in CareNotes® are the copyrighted property of A D A M , Inc  or Ruben Talbot

## 2019-09-09 NOTE — PROGRESS NOTES
INTERNAL MEDICINE FOLLOW-UP OFFICE VISIT  [facility] - [facility] [address] [place] [address]    NAME: Dinora Vidal  AGE: 69 y o  SEX: female  : 1945     DATE: 2019     Assessment and Plan:     1  Poison ivy dermatitis    Continue oral prednisone and cool compresses  Will prescribe stronger topical steroid and 7 day course of antibiotics  - triamcinolone (KENALOG) 0 1 % cream; Apply topically 2 (two) times a day as needed for rash Arms/legs/groin  Dispense: 80 g; Refill: 3  - cephalexin (KEFLEX) 250 mg capsule; Take 1 capsule (250 mg total) by mouth every 8 (eight) hours for 7 days  Dispense: 21 capsule; Refill: 0     Chief Complaint:     Chief Complaint   Patient presents with   [address]     last thursday- very itchy      History of Present Illness:     Bad case of poison ivy  Very itchy  On arms/legs/groin  Started prednisone and hasn't helped ease the lesions or the pruritus  Has been using cool compresses and 1% hydrocortisone as well  Review of Systems:     Review of Systems   Skin: Positive for rash  Pruritus      Problem List:     Patient Active Problem List   Diagnosis    Parkinson's disease ([address] )    Scoliosis    Low back pain    Senile osteoporosis    Acquired hypothyroidism    Patient refuses to disclose advance directive information    Elevated antinuclear antibody (RAS) level    Family hx-breast malignancy    Mixed hyperlipidemia    Rosacea    Trochanteric bursitis    Urinary incontinence    Vitamin D deficiency    Irritable bowel syndrome with both constipation and diarrhea    REM behavioral disorder    Anxiety    Rib pain on right side      Objective:     /80 (BP Location: Left arm, Patient Position: Sitting, Cuff Size: Standard)   Pulse 73   Wt 61 1 kg (134 lb 9 6 oz) Comment: w/ shoes deneid off  SpO2 98%   BMI 25 43 kg/m²     Physical Exam   Skin: Rash (erythematous patches noted arms/legs/groin   Some weeping and increased warmth right distal arm) noted       Lexus Hernández DO  [facility]

## 2019-09-11 ENCOUNTER — HOSPITAL ENCOUNTER (EMERGENCY)
Facility: HOSPITAL | Age: 74
Discharge: HOME | End: 2019-09-11
Attending: EMERGENCY MEDICINE
Payer: COMMERCIAL

## 2019-09-11 VITALS
WEIGHT: 138.45 LBS | DIASTOLIC BLOOD PRESSURE: 99 MMHG | HEIGHT: 63 IN | OXYGEN SATURATION: 99 % | TEMPERATURE: 97.7 F | RESPIRATION RATE: 16 BRPM | HEART RATE: 79 BPM | BODY MASS INDEX: 24.53 KG/M2 | SYSTOLIC BLOOD PRESSURE: 194 MMHG

## 2019-09-11 DIAGNOSIS — L25.9 CONTACT DERMATITIS: Primary | ICD-10-CM

## 2019-09-11 PROCEDURE — 99283 EMERGENCY DEPT VISIT LOW MDM: CPT

## 2019-09-11 PROCEDURE — 99283 EMERGENCY DEPT VISIT LOW MDM: CPT | Performed by: PHYSICIAN ASSISTANT

## 2019-09-11 RX ORDER — PREDNISONE 20 MG/1
40 TABLET ORAL DAILY
Qty: 10 TABLET | Refills: 0 | Status: SHIPPED | OUTPATIENT
Start: 2019-09-11 | End: 2019-09-12 | Stop reason: SDUPTHER

## 2019-09-11 RX ADMIN — DEXAMETHASONE SODIUM PHOSPHATE 10 MG: 10 INJECTION, SOLUTION INTRAMUSCULAR; INTRAVENOUS at 02:54

## 2019-09-12 ENCOUNTER — OFFICE VISIT (OUTPATIENT)
Dept: INTERNAL MEDICINE CLINIC | Facility: CLINIC | Age: 74
End: 2019-09-12
Payer: COMMERCIAL

## 2019-09-12 VITALS
DIASTOLIC BLOOD PRESSURE: 80 MMHG | SYSTOLIC BLOOD PRESSURE: 120 MMHG | HEART RATE: 65 BPM | BODY MASS INDEX: 24.2 KG/M2 | OXYGEN SATURATION: 95 % | WEIGHT: 136.6 LBS

## 2019-09-12 DIAGNOSIS — L25.9 CONTACT DERMATITIS: Primary | ICD-10-CM

## 2019-09-12 PROCEDURE — 99213 OFFICE O/P EST LOW 20 MIN: CPT | Performed by: INTERNAL MEDICINE

## 2019-09-12 RX ORDER — PREDNISONE 20 MG/1
60 TABLET ORAL DAILY
Qty: 15 TABLET | Refills: 0 | Status: SHIPPED | OUTPATIENT
Start: 2019-09-12 | End: 2019-09-17

## 2019-09-12 NOTE — PROGRESS NOTES
INTERNAL MEDICINE FOLLOW-UP OFFICE VISIT  [facility] - [facility] [address] [place] [address]    NAME: Torrey Mancia  AGE: 69 y o  SEX: female  : 1945     DATE: 2019     Assessment and Plan:     1  Contact dermatitis    Bump up to 1mg/kg of prednisone (60mg)  See dermatology tomorrow  - predniSONE 20 mg tablet; Take 3 tablets (60 mg total) by mouth daily for 5 days  Dispense: 15 tablet; Refill: 0     Chief Complaint:     Chief Complaint   Patient presents with   [facility]     not getting better      History of Present Illness:     Severe contact dermatitis - presumed poison ivy  Some lesions were improving, but other lesions started worsening over her body  So irritating to her and uncomfortable, she went to ER  They bumped her back up to 40mg  She took first dose of 40mg this AM  Has appt with dermatology tomorrow  Review of Systems:     Review of Systems   Skin: Positive for rash  Problem List:     Patient Active Problem List   Diagnosis    Parkinson's disease ([address] )    Scoliosis    Low back pain    Senile osteoporosis    Acquired hypothyroidism    Patient refuses to disclose advance directive information    Elevated antinuclear antibody (RAS) level    Family hx-breast malignancy    Mixed hyperlipidemia    Rosacea    Trochanteric bursitis    Urinary incontinence    Vitamin D deficiency    Irritable bowel syndrome with both constipation and diarrhea    REM behavioral disorder    Anxiety    Rib pain on right side    Contact dermatitis      Objective:     /80 (BP Location: Left arm, Patient Position: Sitting, Cuff Size: Standard)   Pulse 65   Wt 62 kg (136 lb 9 6 oz) Comment: w/ shoe denied off  SpO2 95%   BMI 24 20 kg/m²     Physical Exam   Skin: Rash (erythematous rash over body; some areas more red/raised then others) noted  Vitals reviewed      Lauryn Matias DO  [facility] [address] [place] [address]

## 2019-09-12 NOTE — PATIENT INSTRUCTIONS
Contact Dermatitis   WHAT YOU NEED TO KNOW:   Contact dermatitis is a skin rash  It develops when you touch something that irritates your skin or causes an allergic reaction  DISCHARGE INSTRUCTIONS:   Call 911 for any of the following:   · You have sudden trouble breathing  · Your throat swells and you have trouble eating  · Your face is swollen  Contact your healthcare provider if:   · You have a fever  · Your blisters are draining pus  · Your rash spreads or does not get better, even after treatment  · You have questions or concerns about your condition or care  Medicines:   · Medicines  help decrease itching and swelling  They will be given as a topical medicine to apply to your rash or as a pill  · Take your medicine as directed  Contact your healthcare provider if you think your medicine is not helping or if you have side effects  Tell him or her if you are allergic to any medicine  Keep a list of the medicines, vitamins, and herbs you take  Include the amounts, and when and why you take them  Bring the list or the pill bottles to follow-up visits  Carry your medicine list with you in case of an emergency  Manage contact dermatitis:   · Take short baths or showers in cool water  Use mild soap or soap-free cleansers  Add oatmeal, baking soda, or cornstarch to the bath water to help decrease skin irritation  · Avoid skin irritants , such as makeup, hair products, soaps, and cleansers  Use products that do not contain perfume or dye  · Apply a cool compress to your rash  This will help soothe your skin  · Keep your skin moist   Rub unscented cream or lotion on your skin to prevent dryness and itching  Do this right after a bath or shower when your skin is still damp  Follow up with your healthcare provider or dermatologist in 2 to 3 days:  Write down your questions so you remember to ask them during your visits     © 2017 [employer] Information is for End User's use only and may not be sold, redistributed or otherwise used for commercial purposes  All illustrations and images included in CareNotes® are the copyrighted property of A D A M , Inc  or [employer]en Talbot  The above information is an  only  It is not intended as medical advice for individual conditions or treatments  Talk to your doctor, nurse or pharmacist before following any medical regimen to see if it is safe and effective for you

## 2019-09-13 NOTE — ED PROVIDER NOTES
History  Chief Complaint   Patient presents with    Rash     Pt presents to ED with rash x 2 weeks  Pt saw PCP on  was dx with poison ivy and was rx'd Prednisone and hydrocortisone  Pt reported improvement of symptoms for a short time, with a sudden return/worsening of symptoms  Pt visited her PCP again on  and was rx'd Triamcindone Acetondie Cream along with Keflex  Pt again reports relief of symptoms for a short time with a sudden onset of worsening symptoms  Pt presents with rash on chest, abdomen, bilateral arms and legs  Patient is 67-year-old female presents to the emergency department for evaluation regarding a rash that is on her chest, abdomen, arms, legs  Patient states that the rash 1st appeared on   She states that she was told was poison ivy and was given prednisone as well as hydrocortisone cream   States that she has some improvement symptoms and then was re-evaluated on   Patient states she was treated with Keflex and triamcinolone cream   She states that a day later she began having increased rash and itching  She states the rash is very itchy  She denies any fever, chills, nausea, vomiting, chest pain, shortness of breath  Prior to Admission Medications   Prescriptions Last Dose Informant Patient Reported? Taking?    Cholecalciferol (VITAMIN D) 2000 units tablet  Self Yes No   Si daily   Cranberry 1000 MG CAPS  Self Yes No   Sig: Take 4,200 mg by mouth daily    Homeopathic Products (PROSACEA) GEL  Self Yes No   Sig: Apply topically as needed    Multiple Vitamin (MULTIVITAMINS PO)  Self Yes No   Sig: daily   NEUPRO 4 MG/24HR  Self No No   Sig: APPLY 1 PATCH TOPICALLY ONCE DAILY   Probiotic Product (ALIGN) CHEW  Self Yes No   Sig: Chew daily    Wheat Dextrin (BENEFIBER DRINK MIX PO)  Self Yes No   Sig: Take by mouth as needed    aspirin (ECOTRIN LOW STRENGTH) 81 mg EC tablet  Self Yes No   Sig: Take 81 mg by mouth daily   carbidopa-levodopa (SINEMET)  mg per tablet  Self No No   Sig: TAKE 1 & 1/2 (ONE & ONE-HALF) TABLETS BY MOUTH 4 TIMES DAILY   Patient taking differently: 2 tablets in the morning, ( 3 hours later) 1 and 1/2 around  ( 3 hours later) 1 and 1/2, and (sometimes) 1 tab at bedtimes   cyanocobalamin (VITAMIN B-12) 1,000 mcg tablet  Self Yes No   Sig: Take 1,000 mcg by mouth daily   levothyroxine 50 mcg tablet  Self No No   Sig: Take 1 tablet (50 mcg total) by mouth daily   polyethylene glycol (MIRALAX) 17 g packet  Self Yes No   Sig: Take 17 g by mouth as needed    rasagiline (AZILECT) 1 MG  Self No No   Sig: TAKE 1 TABLET BY MOUTH ONCE DAILY   sertraline (ZOLOFT) 50 mg tablet  Self No No   Sig: Take 1 tablet (50 mg total) by mouth daily   Patient not taking: Reported on 9/12/2019   triamcinolone (KENALOG) 0 1 % cream  Self No No   Sig: Apply topically 2 (two) times a day as needed for rash Arms/legs/groin      Facility-Administered Medications: None       Past Medical History:   Diagnosis Date    Anxiety     Disease of thyroid gland     Generalized anxiety disorder 4/23/2015    Hepatic disease     Infectious viral hepatitis     Liver disease     Osteoporosis     Parkinson's disease (HCC)     Rosacea     Shortness of breath        Past Surgical History:   Procedure Laterality Date    FOOT SURGERY Bilateral     HYSTERECTOMY      GA ESOPHAGOGASTRODUODENOSCOPY TRANSORAL DIAGNOSTIC N/A 9/5/2018    Procedure: EGD AND COLONOSCOPY;  Surgeon: Ramya Arriaza MD;  Location: [facility];   Service: Gastroenterology    URETHRA SURGERY      laparoscopic urethral suspension for stress incontinence       Family History   Problem Relation Age of Onset    Lung cancer Mother     Dementia Mother     Cancer Mother     Lung cancer Father     Cancer Father     Esophageal cancer Brother     Cancer Brother     Cavazos's esophagus Brother     Esophageal cancer Family     Heart disease Family     Stroke Family         syndrome     I have reviewed and agree with the history as documented  Social History     Tobacco Use    Smoking status: Never Smoker    Smokeless tobacco: Never Used   Substance Use Topics    Alcohol use: Not Currently     Alcohol/week: 1 0 standard drinks     Types: 1 Glasses of wine per week     Frequency: Monthly or less     Drinks per session: 1 or 2     Binge frequency: Never     Comment: Alcohol use per [employer]    Drug use: No        Review of Systems   Constitutional: Negative for fever  Respiratory: Negative for shortness of breath  Skin: Positive for rash  All other systems reviewed and are negative  Physical Exam  Physical Exam   Constitutional: She is oriented to person, place, and time  She appears well-developed and well-nourished  HENT:   Head: Normocephalic and atraumatic  Cardiovascular: Normal rate, regular rhythm and normal heart sounds  Pulmonary/Chest: Effort normal and breath sounds normal    Abdominal: Soft  Bowel sounds are normal    Neurological: She is alert and oriented to person, place, and time  Skin: Skin is warm  Rash noted  Rash is maculopapular  Diffuse macular papular rash noted on abdomen, bilateral forearms, bilateral legs  The rash is consistent with contact dermatitis  There is no infectious process noted  Psychiatric: She has a normal mood and affect  Her behavior is normal  Judgment and thought content normal    Vitals reviewed        Vital Signs  ED Triage Vitals [09/11/19 0212]   Temperature Pulse Respirations Blood Pressure SpO2   97 7 °F (36 5 °C) 79 16 (!) 194/99 99 %      Temp Source Heart Rate Source Patient Position - Orthostatic VS BP Location FiO2 (%)   Oral Monitor Sitting Right arm --      Pain Score       2           Vitals:    09/11/19 0212   BP: (!) 194/99   Pulse: 79   Patient Position - Orthostatic VS: Sitting         Visual Acuity      ED Medications  Medications   dexamethasone 10 mg/mL oral liquid 10 mg 1 mL (10 mg Oral Given 9/11/19 0254) Diagnostic Studies  Results Reviewed     None                 No orders to display              Procedures  Procedures       ED Course                               MDM  Number of Diagnoses or Management Options  Contact dermatitis:   Diagnosis management comments: Patient is a 71-year-old female presents emergency department for evaluation regarding a rash on her abdomen, bilateral arms, bilateral legs  Patient was started on prednisone and has some symptom improvement at the initial onset  States that she was then switched antibiotics  She states a day later she developed increasing rash  Discussed with patient that her rash may be related to and reaction to the Keflex, or maybe increasing rash that she ready had  Recommend discontinuing Keflex, there is no evidence of infection  Recommend resuming prednisone for the phlegm jodie action  Return parameters were discussed  Patient stable discharge  Risk of Complications, Morbidity, and/or Mortality  Presenting problems: moderate  Diagnostic procedures: low  Management options: moderate    Patient Progress  Patient progress: stable      Disposition  Final diagnoses:   Contact dermatitis     Time reflects when diagnosis was documented in both MDM as applicable and the Disposition within this note     Time User Action Codes Description Comment    9/11/2019  2:40 AM Bailey Ken Add [L25 9] Contact dermatitis       ED Disposition     ED Disposition Condition Date/Time Comment    Discharge Good Wed Sep 11, 2019  2:40 AM Panfilo Arthur discharge to home/self care  Follow-up Information     Follow up With Specialties Details Why 19 Luis Peña MD Internal Medicine Schedule an appointment as soon as possible for a visit   [address] [place] [address] Drive [address] [place] [address]  [phone]            Discharge Medication List as of 9/11/2019  2:41 AM      START taking these medications    Details   ! ! predniSONE 20 mg tablet Take 2 tablets (40 mg total) by mouth daily for 5 days, Starting Wed 9/11/2019, Until Mon 9/16/2019, Print       !! - Potential duplicate medications found  Please discuss with provider        CONTINUE these medications which have NOT CHANGED    Details   aspirin (ECOTRIN LOW STRENGTH) 81 mg EC tablet Take 81 mg by mouth daily, Historical Med      carbidopa-levodopa (SINEMET)  mg per tablet TAKE 1 & 1/2 (ONE & ONE-HALF) TABLETS BY MOUTH 4 TIMES DAILY, Normal      Cholecalciferol (VITAMIN D) 2000 units tablet 1 daily, Historical Med      Cranberry 1000 MG CAPS Take 4,200 mg by mouth daily , Historical Med      cyanocobalamin (VITAMIN B-12) 1,000 mcg tablet Take 1,000 mcg by mouth daily, Historical Med      Homeopathic Products (PROSACEA) GEL Apply topically, Historical Med      levothyroxine 50 mcg tablet Take 1 tablet (50 mcg total) by mouth daily, Starting Fri 12/14/2018, Normal      Multiple Vitamin (MULTIVITAMINS PO) daily, Historical Med      NEUPRO 4 MG/24HR APPLY 1 PATCH TOPICALLY ONCE DAILY, Normal      polyethylene glycol (MIRALAX) 17 g packet Take 17 g by mouth as needed , Historical Med      Probiotic Product (ALIGN) CHEW Chew daily , Historical Med      rasagiline (AZILECT) 1 MG TAKE 1 TABLET BY MOUTH ONCE DAILY, Normal      sertraline (ZOLOFT) 50 mg tablet Take 1 tablet (50 mg total) by mouth daily, Starting Mon 2/11/2019, Normal      triamcinolone (KENALOG) 0 1 % cream Apply topically 2 (two) times a day as needed for rash Arms/legs/groin, Starting Mon 9/9/2019, Normal      Wheat Dextrin (BENEFIBER DRINK MIX PO) Take by mouth as needed , Historical Med      cephalexin (KEFLEX) 250 mg capsule Take 1 capsule (250 mg total) by mouth every 8 (eight) hours for 7 days, Starting Mon 9/9/2019, Until Mon 9/16/2019, Normal      naproxen (NAPROSYN) 500 mg tablet Take 1 tablet (500 mg total) by mouth 2 (two) times a day with meals for 7 days, Starting Mon 8/19/2019, Until Mon 9/9/2019, Normal      !! predniSONE 10 mg tablet Take 4 tabs daily for 3 days followed by 3 tabs daily for 3 days then 2 tabs daily for 3 days then 1 tab daily for 3 days, Normal       !! - Potential duplicate medications found  Please discuss with provider  No discharge procedures on file      ED Provider  Electronically Signed by           Opal Cope PA-C  09/12/19 9632

## 2019-09-21 DIAGNOSIS — G20 PARKINSON'S DISEASE (HCC): ICD-10-CM

## 2019-09-21 DIAGNOSIS — E07.9 THYROID DISORDER: ICD-10-CM

## 2019-09-23 ENCOUNTER — OFFICE VISIT (OUTPATIENT)
Dept: INTERNAL MEDICINE CLINIC | Facility: CLINIC | Age: 74
End: 2019-09-23
Payer: COMMERCIAL

## 2019-09-23 VITALS
BODY MASS INDEX: 24.17 KG/M2 | DIASTOLIC BLOOD PRESSURE: 84 MMHG | OXYGEN SATURATION: 98 % | WEIGHT: 136.4 LBS | HEIGHT: 63 IN | HEART RATE: 100 BPM | SYSTOLIC BLOOD PRESSURE: 122 MMHG

## 2019-09-23 DIAGNOSIS — F41.9 ANXIETY: ICD-10-CM

## 2019-09-23 DIAGNOSIS — F33.41 RECURRENT MAJOR DEPRESSIVE DISORDER, IN PARTIAL REMISSION (HCC): Primary | ICD-10-CM

## 2019-09-23 PROCEDURE — 99213 OFFICE O/P EST LOW 20 MIN: CPT | Performed by: INTERNAL MEDICINE

## 2019-09-23 PROCEDURE — 3008F BODY MASS INDEX DOCD: CPT | Performed by: INTERNAL MEDICINE

## 2019-09-23 RX ORDER — PREDNISONE 10 MG/1
TABLET ORAL
COMMUNITY
Start: 2019-09-13 | End: 2019-10-07 | Stop reason: ALTCHOICE

## 2019-09-23 RX ORDER — ESCITALOPRAM OXALATE 10 MG/1
10 TABLET ORAL DAILY
Qty: 90 TABLET | Refills: 1 | Status: SHIPPED | OUTPATIENT
Start: 2019-09-23 | End: 2019-11-19 | Stop reason: SDUPTHER

## 2019-09-23 RX ORDER — LEVOTHYROXINE SODIUM 0.05 MG/1
TABLET ORAL
Qty: 90 TABLET | Refills: 2 | Status: SHIPPED | OUTPATIENT
Start: 2019-09-23 | End: 2020-05-13

## 2019-10-07 ENCOUNTER — OFFICE VISIT (OUTPATIENT)
Dept: NEUROLOGY | Facility: CLINIC | Age: 74
End: 2019-10-07
Payer: COMMERCIAL

## 2019-10-07 VITALS
SYSTOLIC BLOOD PRESSURE: 110 MMHG | DIASTOLIC BLOOD PRESSURE: 78 MMHG | BODY MASS INDEX: 24.8 KG/M2 | WEIGHT: 140 LBS | HEART RATE: 84 BPM | HEIGHT: 63 IN

## 2019-10-07 DIAGNOSIS — G47.52 REM BEHAVIORAL DISORDER: ICD-10-CM

## 2019-10-07 DIAGNOSIS — F41.8 DEPRESSION WITH ANXIETY: ICD-10-CM

## 2019-10-07 DIAGNOSIS — G20 PARKINSON'S DISEASE (HCC): Primary | ICD-10-CM

## 2019-10-07 PROCEDURE — 99215 OFFICE O/P EST HI 40 MIN: CPT | Performed by: PSYCHIATRY & NEUROLOGY

## 2019-10-07 NOTE — PROGRESS NOTES
Assessment/Plan:    Parkinson's disease [facility])  78-year-old woman with levodopa responsive Parkinson's disease presents in follow-up  Her exam again demonstrates minimal parkinsonism with mild dyskinesias  Her most bothersome symptom at this point is fatigue which appears to be most highly connected to her increased psychosocial stressors and poor sleep  We spent the majority of today discussing the patient's family situation and difficulties with her daughter including coping strategies  We discussed the importance increased physical activity  We agreed to keep her medications unchanged for the time being  We discussed some recent research and medications  Her REM behavior disorder is under decent control  Follow-up in 4 months  Diagnoses and all orders for this visit:    Parkinson's disease ([facility] )    REM behavioral disorder    Depression with anxiety        Total time spent today 45 minutes  Greater than 50% of total time was spent with the patient and / or family counseling and / or coordination of care      Subjective:     Patient ID: Clare De Guzman is a 69 y o  female  I had the pleasure of seeing your patient, Clare De Guzman in the Movement Disorders Clinic at the [facility]  Remi Jean is a 69year-old right-handed woman with levodopa-responsive Parkinson's disease, symptom onset in 2011 with left foot tremor, now complicated by non-motor wearing-off and slight dyskinesias  In Fall of 2011 she noted some balance trouble and later developed micrographia  She was started on Azilect 1mg with little notable improvement  Neupro patch later added   Sinemet was added later and has been titrated up to 1 5 tabs 4 times per day      Current medications and timing:  - Azilect 1mg in the evening   - Sinemet 25/100; 4 times per day (2/1 5/1 5/1) at 8am, noon, 3:30pm, +/-7 pm roughly  - Neupro Patch 4mg evening       Prior medication trials:  None     Interval history: here with her  for the first time   Fatigue has been an issues with her wearing off  Daughter left her job in [employer] [address] and moved back home  Mental health issues  38 yo  Blames all of her problems on her mother, the patient  Very verbally abusive  Fearful for her safety at times  She has never been physically abusive  Knows she can call the police  Switched zoloft to [employer] like overall things are ok  Hasn't gotten back to physical activity  Regarding medication complications:  Wearing off: meds last about 3:30 hours  Dyskinesias: not bothersome   Doesn't notice them  Impulse control issues: no       The following portions of the patient's history were reviewed and updated as appropriate: allergies, current medications, past family history, past medical history, past social history, past surgical history and problem list       Objective:  /78 (BP Location: Left arm, Patient Position: Standing, Cuff Size: Standard)   Pulse 84   Ht 5' 3" (1 6 m)   Wt 63 5 kg (140 lb)   BMI 24 80 kg/m²     Physical Exam    Neurological Exam     UPDRS motor:                              Time since last dose:  45     Speech  0     Facial Expression  1     Rigidity - Neck  0     Rigidity - Upper Extremity (Right)  0     Rigidity - Upper Extremity (Left)   0     Rigidity - Lower Extremity (Right)  0     Rigidity - Lower Extremity (Left)   0     Finger Taps (Right)   0     Finger Taps (Left)   0     Hand Movement (Right)  0     Hand Movement (Left)   0     Pronation/Supination (Right)  0     Pronation/Supination (Left)   0     Toe Tapping (Right) 1     Toe Tapping (Left) 2     Leg Agility (Right)  1     Leg Agility (Left)   1     Arising from Chair   1     Gait   0     Freezing of Gait 0     Postural Stability        Posture 1     Global spontaneity of movement 0     Postural Tremor (Right) 0     Postural Tremor (Left) 0     Kinetic Tremor (Right)  0     Kinetic Tremor (Left)  0     Rest tremor amplitude RUE 0 Rest tremor amplitude LUE 0     Rest tremor amplitude RLE 0     Reset tremor amplitude LLE 2     Lip/Jaw Tremor  0     Consistency of tremor 1     Motor Exam Total:          Mild central dyskinesia     Review of Systems   Constitutional: Positive for fatigue  Negative for appetite change and fever  HENT: Negative  Negative for hearing loss, tinnitus, trouble swallowing and voice change  Eyes: Negative  Negative for photophobia and pain  Respiratory: Negative  Negative for shortness of breath  Cardiovascular: Negative  Negative for palpitations  Gastrointestinal: Negative  Negative for nausea and vomiting  Endocrine: Negative  Negative for cold intolerance and heat intolerance  Genitourinary: Positive for frequency  Negative for dysuria and urgency  Musculoskeletal: Positive for gait problem (falls)  Negative for myalgias and neck pain  Skin: Negative  Negative for rash  Neurological: Negative for dizziness, tremors, seizures, syncope, facial asymmetry, speech difficulty, weakness, light-headedness, numbness and headaches  Hematological: Negative  Does not bruise/bleed easily  Psychiatric/Behavioral: Negative  Negative for confusion, hallucinations and sleep disturbance  The above ROS was reviewed and updated       Caprice Ovalles MD  Medical Director   [facility]  Movement and Memory Specialist       Current Outpatient Medications on File Prior to Visit   Medication Sig Dispense Refill    aspirin (ECOTRIN LOW STRENGTH) 81 mg EC tablet Take 81 mg by mouth daily      carbidopa-levodopa (SINEMET)  mg per tablet TAKE 1 & 1/2 (ONE & ONE-HALF) TABLETS BY MOUTH 4 TIMES DAILY 540 tablet 3    Cholecalciferol (VITAMIN D) 2000 units tablet 1 daily      Cranberry 1000 MG CAPS Take 4,200 mg by mouth daily       cyanocobalamin (VITAMIN B-12) 1,000 mcg tablet Take 1,000 mcg by mouth daily      escitalopram (LEXAPRO) 10 mg tablet Take 1 tablet (10 mg total) by mouth daily 90 tablet 1    Homeopathic Products (PROSACEA) GEL Apply topically as needed       levothyroxine 50 mcg tablet TAKE 1 TABLET BY MOUTH ONCE DAILY 90 tablet 2    Multiple Vitamin (MULTIVITAMINS PO) daily      NEUPRO 4 MG/24HR APPLY 1 PATCH TOPICALLY ONCE DAILY 30 patch 5    polyethylene glycol (MIRALAX) 17 g packet Take 17 g by mouth as needed       Probiotic Product (ALIGN) CHEW Chew daily       rasagiline (AZILECT) 1 MG TAKE 1 TABLET BY MOUTH ONCE DAILY 30 tablet 3    Wheat Dextrin (BENEFIBER DRINK MIX PO) Take by mouth as needed       [DISCONTINUED] predniSONE 10 mg tablet Take 3 tabs daily X 4 days, then 2 tabs daily X 7 days, then 1 tab daily X 7 d      [DISCONTINUED] sertraline (ZOLOFT) 50 mg tablet Take 1 tablet (50 mg total) by mouth daily (Patient not taking: Reported on 9/12/2019) 90 tablet 1    [DISCONTINUED] triamcinolone (KENALOG) 0 1 % cream Apply topically 2 (two) times a day as needed for rash Arms/legs/groin (Patient not taking: Reported on 9/23/2019) 80 g 3     No current facility-administered medications on file prior to visit

## 2019-10-07 NOTE — ASSESSMENT & PLAN NOTE
78-year-old woman with levodopa responsive Parkinson's disease presents in follow-up  Her exam again demonstrates minimal parkinsonism with mild dyskinesias  Her most bothersome symptom at this point is fatigue which appears to be most highly connected to her increased psychosocial stressors and poor sleep  We spent the majority of today discussing the patient's family situation and difficulties with her daughter including coping strategies  We discussed the importance increased physical activity  We agreed to keep her medications unchanged for the time being  We discussed some recent research and medications  Her REM behavior disorder is under decent control  Follow-up in 4 months

## 2019-10-26 ENCOUNTER — APPOINTMENT (OUTPATIENT)
Dept: LAB | Facility: CLINIC | Age: 74
End: 2019-10-26
Payer: COMMERCIAL

## 2019-10-26 DIAGNOSIS — E03.9 ACQUIRED HYPOTHYROIDISM: ICD-10-CM

## 2019-10-26 DIAGNOSIS — E78.2 MIXED HYPERLIPIDEMIA: ICD-10-CM

## 2019-10-26 LAB
ALBUMIN SERPL BCP-MCNC: 3.7 G/DL (ref 3.5–5)
ALP SERPL-CCNC: 70 U/L (ref 46–116)
ALT SERPL W P-5'-P-CCNC: 9 U/L (ref 12–78)
ANION GAP SERPL CALCULATED.3IONS-SCNC: 5 MMOL/L (ref 4–13)
AST SERPL W P-5'-P-CCNC: 16 U/L (ref 5–45)
BASOPHILS # BLD AUTO: 0.07 THOUSANDS/ΜL (ref 0–0.1)
BASOPHILS NFR BLD AUTO: 1 % (ref 0–1)
BILIRUB SERPL-MCNC: 0.49 MG/DL (ref 0.2–1)
BUN SERPL-MCNC: 18 MG/DL (ref 5–25)
CALCIUM SERPL-MCNC: 8.7 MG/DL (ref 8.3–10.1)
CHLORIDE SERPL-SCNC: 108 MMOL/L (ref 100–108)
CHOLEST SERPL-MCNC: 217 MG/DL (ref 50–200)
CO2 SERPL-SCNC: 27 MMOL/L (ref 21–32)
CREAT SERPL-MCNC: 0.75 MG/DL (ref 0.6–1.3)
EOSINOPHIL # BLD AUTO: 0.35 THOUSAND/ΜL (ref 0–0.61)
EOSINOPHIL NFR BLD AUTO: 5 % (ref 0–6)
ERYTHROCYTE [DISTWIDTH] IN BLOOD BY AUTOMATED COUNT: 13.6 % (ref 11.6–15.1)
GFR SERPL CREATININE-BSD FRML MDRD: 79 ML/MIN/1.73SQ M
GLUCOSE P FAST SERPL-MCNC: 94 MG/DL (ref 65–99)
HCT VFR BLD AUTO: 45.1 % (ref 34.8–46.1)
HDLC SERPL-MCNC: 61 MG/DL
HGB BLD-MCNC: 14.3 G/DL (ref 11.5–15.4)
IMM GRANULOCYTES # BLD AUTO: 0.05 THOUSAND/UL (ref 0–0.2)
IMM GRANULOCYTES NFR BLD AUTO: 1 % (ref 0–2)
LDLC SERPL CALC-MCNC: 141 MG/DL (ref 0–100)
LYMPHOCYTES # BLD AUTO: 1.43 THOUSANDS/ΜL (ref 0.6–4.47)
LYMPHOCYTES NFR BLD AUTO: 20 % (ref 14–44)
MCH RBC QN AUTO: 30 PG (ref 26.8–34.3)
MCHC RBC AUTO-ENTMCNC: 31.7 G/DL (ref 31.4–37.4)
MCV RBC AUTO: 95 FL (ref 82–98)
MONOCYTES # BLD AUTO: 0.58 THOUSAND/ΜL (ref 0.17–1.22)
MONOCYTES NFR BLD AUTO: 8 % (ref 4–12)
NEUTROPHILS # BLD AUTO: 4.87 THOUSANDS/ΜL (ref 1.85–7.62)
NEUTS SEG NFR BLD AUTO: 65 % (ref 43–75)
NONHDLC SERPL-MCNC: 156 MG/DL
NRBC BLD AUTO-RTO: 0 /100 WBCS
PLATELET # BLD AUTO: 249 THOUSANDS/UL (ref 149–390)
PMV BLD AUTO: 11.1 FL (ref 8.9–12.7)
POTASSIUM SERPL-SCNC: 4.3 MMOL/L (ref 3.5–5.3)
PROT SERPL-MCNC: 6.7 G/DL (ref 6.4–8.2)
RBC # BLD AUTO: 4.76 MILLION/UL (ref 3.81–5.12)
SODIUM SERPL-SCNC: 140 MMOL/L (ref 136–145)
TRIGL SERPL-MCNC: 77 MG/DL
TSH SERPL DL<=0.05 MIU/L-ACNC: 1.36 UIU/ML (ref 0.36–3.74)
WBC # BLD AUTO: 7.35 THOUSAND/UL (ref 4.31–10.16)

## 2019-10-26 PROCEDURE — 84443 ASSAY THYROID STIM HORMONE: CPT

## 2019-10-26 PROCEDURE — 85025 COMPLETE CBC W/AUTO DIFF WBC: CPT

## 2019-10-26 PROCEDURE — 80053 COMPREHEN METABOLIC PANEL: CPT

## 2019-10-26 PROCEDURE — 36415 COLL VENOUS BLD VENIPUNCTURE: CPT

## 2019-10-26 PROCEDURE — 80061 LIPID PANEL: CPT

## 2019-11-06 ENCOUNTER — OFFICE VISIT (OUTPATIENT)
Dept: INTERNAL MEDICINE CLINIC | Facility: CLINIC | Age: 74
End: 2019-11-06
Payer: COMMERCIAL

## 2019-11-06 VITALS
DIASTOLIC BLOOD PRESSURE: 78 MMHG | HEIGHT: 63 IN | OXYGEN SATURATION: 99 % | HEART RATE: 82 BPM | SYSTOLIC BLOOD PRESSURE: 116 MMHG | WEIGHT: 140.6 LBS | BODY MASS INDEX: 24.91 KG/M2

## 2019-11-06 DIAGNOSIS — F33.41 RECURRENT MAJOR DEPRESSIVE DISORDER, IN PARTIAL REMISSION (HCC): Primary | ICD-10-CM

## 2019-11-06 DIAGNOSIS — E03.9 ACQUIRED HYPOTHYROIDISM: ICD-10-CM

## 2019-11-06 DIAGNOSIS — G20 PARKINSON'S DISEASE (HCC): ICD-10-CM

## 2019-11-06 DIAGNOSIS — E78.2 MIXED HYPERLIPIDEMIA: ICD-10-CM

## 2019-11-06 DIAGNOSIS — E55.9 VITAMIN D DEFICIENCY: ICD-10-CM

## 2019-11-06 DIAGNOSIS — M81.0 SENILE OSTEOPOROSIS: ICD-10-CM

## 2019-11-06 DIAGNOSIS — Z11.59 NEED FOR HEPATITIS C SCREENING TEST: ICD-10-CM

## 2019-11-06 DIAGNOSIS — F41.9 ANXIETY: ICD-10-CM

## 2019-11-06 PROBLEM — R07.81 RIB PAIN ON RIGHT SIDE: Status: RESOLVED | Noted: 2019-08-19 | Resolved: 2019-11-06

## 2019-11-06 PROBLEM — L25.9 CONTACT DERMATITIS: Status: RESOLVED | Noted: 2019-09-11 | Resolved: 2019-11-06

## 2019-11-06 PROCEDURE — 99214 OFFICE O/P EST MOD 30 MIN: CPT | Performed by: INTERNAL MEDICINE

## 2019-11-06 NOTE — PROGRESS NOTES
INTERNAL MEDICINE OFFICE VISIT  [facility] AT [address]  Tel: [phone]      NAME: Burton Nielson  AGE: 69 y o  SEX: female  : 1945   MRN: [id number]    DATE: 2019  TIME: 4:48 PM      Assessment and Plan:  1  Recurrent major depressive disorder, in partial remission ([facility] [address] )    She was started on 10 mg of Lexapro at her last visit  She feels a little better but still is very depressed and cries all the time  She does not want me to increase the dose of the Lexapro at this point  She will call me in a month  2  Anxiety    Continue Lexapro    3  Mixed hyperlipidemia   continue a low-fat diet  - CBC and differential; Future  - Comprehensive metabolic panel; Future  - Lipid panel; Future    4  Parkinson's disease ([facility] [address] )   continue Sinemet    5  Acquired hypothyroidism   continue levothyroxine at the same dose  - TSH, 3rd generation; Future    6  Vitamin D deficiency   continue vitamin-D  - Vitamin D 25 hydroxy; Future    7  Need for hepatitis C screening test    - Hepatitis C antibody; Future    8  Senile osteoporosis   will repeat a DEXA scan  - DXA bone density spine hip and pelvis; Future      - Counseling Documentation: patient was counseled regarding: diagnostic results, instructions for management, risk factor reductions, prognosis, patient and family education, impressions, risks and benefits of treatment options and importance of compliance with treatment  - Medication Side Effects: Adverse side effects of medications were reviewed with the patient/guardian today  Return for follow up visit in  3 months or earlier, if needed  Chief Complaint:  Chief Complaint   Patient presents with    Well Check     6 month         History of Present Illness:    depression and anxiety - she was started on the Lexapro but still feels that she has a lot of symptoms which are not getting controlled with this dosage  Hyperlipidemia -does not take any medication for it  Parkinson's disease -stable on the Sinemet  Hypothyroidism -TSH is normal on the present dose of levothyroxine  Vitamin-D deficiency -takes vitamin-D supplement regularly      Active Problem List:  Patient Active Problem List   Diagnosis    [facility] ([facility]    Scoliosis    Low back pain    Senile osteoporosis    Acquired hypothyroidism    Patient refuses to disclose advance directive information    Elevated antinuclear antibody (RAS) level    Family hx-breast malignancy    Mixed hyperlipidemia    Rosacea    Trochanteric bursitis    Urinary incontinence    Vitamin D deficiency    Irritable bowel syndrome with both constipation and diarrhea    REM behavioral disorder    Anxiety    Recurrent major depressive disorder, in partial remission ([facility] )         Past Medical History:  Past Medical History:   Diagnosis Date    Anxiety     Disease of thyroid gland     Generalized anxiety disorder 4/23/2015    Hepatic disease     Infectious viral hepatitis     Liver disease     Osteoporosis     [facility]'s disease ([facility] )     Rosacea     Shortness of breath          Past Surgical History:  Past Surgical History:   Procedure Laterality Date    FOOT SURGERY Bilateral     HYSTERECTOMY      MN ESOPHAGOGASTRODUODENOSCOPY TRANSORAL DIAGNOSTIC N/A 9/5/2018    Procedure: EGD AND COLONOSCOPY;  Surgeon: Gerhardt Kitten, MD;  Location: [facility];   Service: Gastroenterology    URETHRA SURGERY      laparoscopic urethral suspension for stress incontinence         Family History:  Family History   Problem Relation Age of Onset    Lung cancer Mother     Dementia Mother     Cancer Mother     Lung cancer Father     Cancer Father     Esophageal cancer Brother     Cancer Brother     Cavazos's esophagus Brother     Esophageal cancer Family     Heart disease Family     Stroke Family         syndrome         Social History:  Social History Socioeconomic History    Marital status: Significant Other     Spouse name: None    Number of children: None    Years of education: Master's Degree    Highest education level: None   Occupational History     Comment: Retired   Social Needs    Financial resource strain: None    Food insecurity:     Worry: None     Inability: None    Transportation needs:     Medical: None     Non-medical: None   Tobacco Use    Smoking status: Never Smoker    Smokeless tobacco: Never Used   Substance and Sexual Activity    Alcohol use: Not Currently     Alcohol/week: 1 0 standard drinks     Types: 1 Glasses of wine per week     Frequency: Monthly or less     Drinks per session: 1 or 2     Binge frequency: Never     Comment: Alcohol use per [employer]    Drug use: No    Sexual activity: Never   Lifestyle    Physical activity:     Days per week: 1 day     Minutes per session: 10 min    Stress: Not at all   Relationships    Social connections:     Talks on phone: None     Gets together: None     Attends Roman [employer] service: None     Active member of club or organization: None     Attends meetings of clubs or organizations: None     Relationship status: None    Intimate partner violence:     Fear of current or ex partner: None     Emotionally abused: None     Physically abused: None     Forced sexual activity: None   Other Topics Concern    None   Social History Narrative    Denied:  History of caffeine use         Allergies:   Allergies   Allergen Reactions    Statins Other (See Comments)     jaundice    Naproxen Hives    Simvastatin Other (See Comments)     Jaundice    Dust Mite Extract Other (See Comments)     Per pt states does not know the type of reaction    Pollen Extract Sneezing and Nasal Congestion         Medications:    Current Outpatient Medications:     aspirin (ECOTRIN LOW STRENGTH) 81 mg EC tablet, Take 81 mg by mouth daily, Disp: , Rfl:     carbidopa-levodopa (SINEMET)  mg per tablet, TAKE 1 & 1/2 (ONE & ONE-HALF) TABLETS BY MOUTH 4 TIMES DAILY, Disp: 540 tablet, Rfl: 3    Cholecalciferol (VITAMIN D) 2000 units tablet, 1 daily, Disp: , Rfl:     Cranberry 1000 MG CAPS, Take 4,200 mg by mouth daily , Disp: , Rfl:     cyanocobalamin (VITAMIN B-12) 1,000 mcg tablet, Take 1,000 mcg by mouth daily, Disp: , Rfl:     escitalopram (LEXAPRO) 10 mg tablet, Take 1 tablet (10 mg total) by mouth daily, Disp: 90 tablet, Rfl: 1    Homeopathic Products (PROSACEA) GEL, Apply topically as needed , Disp: , Rfl:     levothyroxine 50 mcg tablet, TAKE 1 TABLET BY MOUTH ONCE DAILY, Disp: 90 tablet, Rfl: 2    Multiple Vitamin (MULTIVITAMINS PO), daily, Disp: , Rfl:     NEUPRO 4 MG/24HR, APPLY 1 PATCH TOPICALLY ONCE DAILY, Disp: 30 patch, Rfl: 5    polyethylene glycol (MIRALAX) 17 g packet, Take 17 g by mouth as needed , Disp: , Rfl:     Probiotic Product (ALIGN) CHEW, Chew daily , Disp: , Rfl:     rasagiline (AZILECT) 1 MG, TAKE 1 TABLET BY MOUTH ONCE DAILY, Disp: 30 tablet, Rfl: 3    Wheat Dextrin (BENEFIBER DRINK MIX PO), Take by mouth as needed , Disp: , Rfl:       The following portions of the patient's history were reviewed and updated as appropriate: past medical history, past surgical history, family history, social history, allergies, current medications and active problem list       Review of Systems:  Constitutional: Denies fever, chills, weight gain, weight loss, fatigue  Eyes: Denies eye redness, eye discharge, double vision, change in visual acuity  ENT: Denies hearing loss, tinnitus, sneezing, nasal congestion, nasal discharge, sore throat   Respiratory: Denies cough, expectoration, hemoptysis, shortness of breath, wheezing  Cardiovascular: Denies chest pain, palpitations, lower extremity swelling, orthopnea, PND  Gastrointestinal: Denies abdominal pain, heartburn, nausea, vomiting, hematemesis, diarrhea, bloody stools  Genito-Urinary: Denies dysuria, frequency, difficulty in micturition, nocturia, incontinence  Musculoskeletal: Denies back pain, joint pain, muscle pain  Neurologic: Denies confusion, lightheadedness, syncope, headache, focal weakness, sensory changes, seizures  Endocrine: Denies polyuria, polydipsia, temperature intolerance  Allergy and Immunology: Denies hives, insect bite sensitivity  Hematological and Lymphatic: Denies bleeding problems, swollen glands   Psychological: has depression,  anxiety  Dermatological: Denies pruritus, rash, skin lesion changes      Vitals:  Vitals:    11/06/19 1102   BP: 116/78   Pulse: 82   SpO2: 99%       Body mass index is 24 91 kg/m²  Weight (last 2 days)     Date/Time   Weight    11/06/19 1102   63 8 (140 6)                Physical Examination:  General: Patient is not in acute distress  Awake, alert, responding to commands  No weight gain or loss  Head: Normocephalic  Atraumatic  Eyes: Conjunctiva and lids with no swelling, erythema or discharge  Both pupils normal sized, round and reactive to light  Sclera nonicteric  ENT: External examination of nose and ear normal  Otoscopic examination shows translucent tympanic membranes with patent canals without erythema  Oropharynx moist with no erythema, edema, exudate or lesions  Neck: Supple  JVP not raised  Trachea midline  No masses  No thyromegaly  Lungs: No signs of increased work of breathing or respiratory distress  Bilateral bronchovascular breath sounds with no crackles or rhonchi  Chest wall: No tenderness  Cardiovascular: Normal PMI  No thrills  Regular rate and rhythm  S1 and S2 normal  No murmur, rub or gallop  Gastrointestinal: Abdomen soft, nontender  No guarding or rigidity  Liver and spleen not palpable  Bowel sounds present  Neurologic: Cranial nerves II-XII intact   Cortical functions normal  Motor system - Reflexes 2+ and symmetrical  Sensations normal  Musculoskeletal: Gait normal  No joint tenderness  Integumentary: Skin normal with no rash or lesions  Lymphatic: No palpable lymph nodes in neck, axilla or groin  Extremities: No clubbing, cyanosis, edema or varicosities  Psychological: Judgement and insight normal  Depressed      Laboratory Results:  CBC with diff:   Lab Results   Component Value Date    WBC 7 35 10/26/2019    RBC 4 76 10/26/2019    HGB 14 3 10/26/2019    HCT 45 1 10/26/2019    MCV 95 10/26/2019    MCH 30 0 10/26/2019    RDW 13 6 10/26/2019     10/26/2019       CMP:  Lab Results   Component Value Date    CREATININE 0 75 10/26/2019    BUN 18 10/26/2019    K 4 3 10/26/2019     10/26/2019    CO2 27 10/26/2019    ALKPHOS 70 10/26/2019    ALT 9 (L) 10/26/2019    AST 16 10/26/2019       Lab Results   Component Value Date    MG 2 4 07/25/2018       No results found for: TROPONINI, CKMB, CKTOTAL    Lipid Profile:   No results found for: CHOL  Lab Results   Component Value Date    HDL 61 10/26/2019    HDL 63 (H) 04/25/2019     Lab Results   Component Value Date    LDLCALC 141 (H) 10/26/2019    LDLCALC 143 (H) 04/25/2019     Lab Results   Component Value Date    TRIG 77 10/26/2019    TRIG 98 04/25/2019       Imaging Results:  XR ribs right w pa chest min 3 views  Narrative: RIGHT RIBS AND CHEST    INDICATION:   R07 81: Pleurodynia  Right rib pain after fall  COMPARISON:  None    VIEWS:  XR RIBS RIGHT W PA CHEST MIN 3 VIEWS     FINDINGS:    Cardiomediastinal silhouette appears unremarkable  Lungs are clear  No pleural effusions  There is no pneumothorax  No rib fractures are identified  Severe S-shaped scoliosis of the thoracolumbar spine  Impression: No active cardiopulmonary disease  No evidence of rib fractures  Severe S-shaped scoliosis of the thoracolumbar spine      Workstation performed: [id number]       Health Maintenance:  Health Maintenance   Topic Date Due    Hepatitis C Screening  1945   [facility] Annual Wellness Visit (AWV)  1945    DXA SCAN  1945    Fall Risk  05/22/2020    Urinary Incontinence Screening  05/22/2020    BMI: Adult  11/06/2020    DTaP,Tdap,and Td Vaccines (2 - Td) 10/25/2022    CRC Screening: Colonoscopy  09/05/2028    INFLUENZA VACCINE  Completed    Pneumococcal Vaccine: 65+ Years  Completed    Pneumococcal Vaccine: Pediatrics (0 to 5 Years) and At-Risk Patients (6 to 59 Years)  Aged Out    HEPATITIS B VACCINES  Aged [employer] History   Administered Date(s) Administered    H1N1 Inj 12/06/2009    H1N1, All Formulations 12/14/2009, 12/14/2009    Hep A, adult 06/06/1996, 05/22/1997    INFLUENZA 12/04/2006, 11/07/2007, 10/03/2008, 02/12/2010, 09/15/2010, 09/21/2011, 10/25/2012, 11/01/2013, 10/15/2014, 10/05/2015, 10/02/2016, 11/13/2017, 09/18/2018    Influenza Quadrivalent 3 years and older 10/05/2015    Influenza Quadrivalent 6 mos and older IM 09/18/2018    Influenza TIV (IM) 12/04/2006, 11/07/2007, 10/03/2008, 02/12/2010, 09/15/2010, 09/21/2011, 10/25/2012, 11/01/2013, 10/15/2014    Pneumococcal Conjugate 13-Valent 05/03/2016    Pneumococcal Polysaccharide PPV23 03/17/2011    Td (adult), adsorbed 06/10/2002    Tdap 10/25/2012    Zoster 09/12/2012    influenza, trivalent, adjuvanted 10/14/2019         Sandy Vázquez MD  11/6/2019,4:48 PM

## 2019-11-14 DIAGNOSIS — G20 PARKINSON'S DISEASE (HCC): ICD-10-CM

## 2019-11-14 NOTE — TELEPHONE ENCOUNTER
Refill request     Carbidopa-levodopa 25-100mg   1 1/2 tablets 4 times a day     Pt called [facility] for a refill but they told her they do not have refills on medication    Send to [address]

## 2019-11-19 ENCOUNTER — TELEPHONE (OUTPATIENT)
Dept: INTERNAL MEDICINE CLINIC | Facility: CLINIC | Age: 74
End: 2019-11-19

## 2019-11-19 DIAGNOSIS — F33.41 RECURRENT MAJOR DEPRESSIVE DISORDER, IN PARTIAL REMISSION (HCC): ICD-10-CM

## 2019-11-19 RX ORDER — ESCITALOPRAM OXALATE 20 MG/1
20 TABLET ORAL DAILY
Qty: 90 TABLET | Refills: 1 | Status: SHIPPED | OUTPATIENT
Start: 2019-11-19 | End: 2020-02-12 | Stop reason: SDUPTHER

## 2019-11-19 NOTE — TELEPHONE ENCOUNTER
Patient wants to know if escitalopram (LEXAPRO) 10 mg tablet  Will be increased to 20 mg   Would like to talk with Dr Teresa Hartmann about it first

## 2019-11-21 ENCOUNTER — TELEPHONE (OUTPATIENT)
Dept: GASTROENTEROLOGY | Facility: CLINIC | Age: 74
End: 2019-11-21

## 2019-11-29 ENCOUNTER — DOCUMENTATION (OUTPATIENT)
Dept: GASTROENTEROLOGY | Facility: CLINIC | Age: 74
End: 2019-11-29

## 2019-12-31 ENCOUNTER — APPOINTMENT (OUTPATIENT)
Dept: LAB | Facility: CLINIC | Age: 74
End: 2019-12-31
Payer: COMMERCIAL

## 2019-12-31 DIAGNOSIS — E55.9 VITAMIN D DEFICIENCY: ICD-10-CM

## 2019-12-31 DIAGNOSIS — Z11.59 NEED FOR HEPATITIS C SCREENING TEST: ICD-10-CM

## 2019-12-31 DIAGNOSIS — E78.2 MIXED HYPERLIPIDEMIA: ICD-10-CM

## 2019-12-31 DIAGNOSIS — E03.9 ACQUIRED HYPOTHYROIDISM: ICD-10-CM

## 2019-12-31 LAB
25(OH)D3 SERPL-MCNC: 28.5 NG/ML (ref 30–100)
ALBUMIN SERPL BCP-MCNC: 3.6 G/DL (ref 3.5–5)
ALP SERPL-CCNC: 77 U/L (ref 46–116)
ALT SERPL W P-5'-P-CCNC: 26 U/L (ref 12–78)
ANION GAP SERPL CALCULATED.3IONS-SCNC: 3 MMOL/L (ref 4–13)
AST SERPL W P-5'-P-CCNC: 15 U/L (ref 5–45)
BASOPHILS # BLD AUTO: 0.07 THOUSANDS/ΜL (ref 0–0.1)
BASOPHILS NFR BLD AUTO: 1 % (ref 0–1)
BILIRUB SERPL-MCNC: 0.49 MG/DL (ref 0.2–1)
BUN SERPL-MCNC: 16 MG/DL (ref 5–25)
CALCIUM SERPL-MCNC: 8.7 MG/DL (ref 8.3–10.1)
CHLORIDE SERPL-SCNC: 107 MMOL/L (ref 100–108)
CHOLEST SERPL-MCNC: 236 MG/DL (ref 50–200)
CO2 SERPL-SCNC: 29 MMOL/L (ref 21–32)
CREAT SERPL-MCNC: 0.78 MG/DL (ref 0.6–1.3)
EOSINOPHIL # BLD AUTO: 0.42 THOUSAND/ΜL (ref 0–0.61)
EOSINOPHIL NFR BLD AUTO: 6 % (ref 0–6)
ERYTHROCYTE [DISTWIDTH] IN BLOOD BY AUTOMATED COUNT: 12.9 % (ref 11.6–15.1)
GFR SERPL CREATININE-BSD FRML MDRD: 76 ML/MIN/1.73SQ M
GLUCOSE P FAST SERPL-MCNC: 93 MG/DL (ref 65–99)
HCT VFR BLD AUTO: 46.8 % (ref 34.8–46.1)
HCV AB SER QL: NORMAL
HDLC SERPL-MCNC: 60 MG/DL
HGB BLD-MCNC: 14.7 G/DL (ref 11.5–15.4)
IMM GRANULOCYTES # BLD AUTO: 0.02 THOUSAND/UL (ref 0–0.2)
IMM GRANULOCYTES NFR BLD AUTO: 0 % (ref 0–2)
LDLC SERPL CALC-MCNC: 144 MG/DL (ref 0–100)
LYMPHOCYTES # BLD AUTO: 2.05 THOUSANDS/ΜL (ref 0.6–4.47)
LYMPHOCYTES NFR BLD AUTO: 29 % (ref 14–44)
MCH RBC QN AUTO: 29.5 PG (ref 26.8–34.3)
MCHC RBC AUTO-ENTMCNC: 31.4 G/DL (ref 31.4–37.4)
MCV RBC AUTO: 94 FL (ref 82–98)
MONOCYTES # BLD AUTO: 0.44 THOUSAND/ΜL (ref 0.17–1.22)
MONOCYTES NFR BLD AUTO: 6 % (ref 4–12)
NEUTROPHILS # BLD AUTO: 3.99 THOUSANDS/ΜL (ref 1.85–7.62)
NEUTS SEG NFR BLD AUTO: 58 % (ref 43–75)
NONHDLC SERPL-MCNC: 176 MG/DL
NRBC BLD AUTO-RTO: 0 /100 WBCS
PLATELET # BLD AUTO: 243 THOUSANDS/UL (ref 149–390)
PMV BLD AUTO: 11.1 FL (ref 8.9–12.7)
POTASSIUM SERPL-SCNC: 4.1 MMOL/L (ref 3.5–5.3)
PROT SERPL-MCNC: 7 G/DL (ref 6.4–8.2)
RBC # BLD AUTO: 4.98 MILLION/UL (ref 3.81–5.12)
SODIUM SERPL-SCNC: 139 MMOL/L (ref 136–145)
TRIGL SERPL-MCNC: 159 MG/DL
TSH SERPL DL<=0.05 MIU/L-ACNC: 1.99 UIU/ML (ref 0.36–3.74)
WBC # BLD AUTO: 6.99 THOUSAND/UL (ref 4.31–10.16)

## 2019-12-31 PROCEDURE — 36415 COLL VENOUS BLD VENIPUNCTURE: CPT

## 2019-12-31 PROCEDURE — 86803 HEPATITIS C AB TEST: CPT

## 2019-12-31 PROCEDURE — 80061 LIPID PANEL: CPT

## 2019-12-31 PROCEDURE — 85025 COMPLETE CBC W/AUTO DIFF WBC: CPT

## 2019-12-31 PROCEDURE — 80053 COMPREHEN METABOLIC PANEL: CPT

## 2019-12-31 PROCEDURE — 84443 ASSAY THYROID STIM HORMONE: CPT

## 2019-12-31 PROCEDURE — 82306 VITAMIN D 25 HYDROXY: CPT

## 2020-01-06 ENCOUNTER — TELEPHONE (OUTPATIENT)
Dept: INTERNAL MEDICINE CLINIC | Facility: CLINIC | Age: 75
End: 2020-01-06

## 2020-01-07 NOTE — TELEPHONE ENCOUNTER
----- Message from Charlee Dee MD sent at 1/6/2020  7:06 PM EST -----  Please continue to take the vitamin-D, rest of the labs are stable

## 2020-01-07 NOTE — TELEPHONE ENCOUNTER
Spoke with: patient  Re:  Lab results/Vit D instructions  Provider's message/resuts/instructions/inquiries relayed in full detal   Comments:

## 2020-01-20 DIAGNOSIS — G20 PARKINSON'S DISEASE (HCC): ICD-10-CM

## 2020-01-21 ENCOUNTER — TELEPHONE (OUTPATIENT)
Dept: NEUROLOGY | Facility: CLINIC | Age: 75
End: 2020-01-21

## 2020-01-21 RX ORDER — RASAGILINE 1 MG/1
TABLET ORAL
Qty: 90 EACH | Refills: 3 | Status: SHIPPED | OUTPATIENT
Start: 2020-01-21 | End: 2020-08-14

## 2020-01-21 NOTE — TELEPHONE ENCOUNTER
Called patient to see if she would be available to come in on March 2nd to see Dr Luigi Hampton in [facility]  Patient is on wait list for sooner appointment  Per Everett Jaspreet can double book Dr Luigi Hampton for [employer] or 2 on March 2nd  No answer  Left voicemail  Please transfer the call to me or send me a message if the patient can come in that day so I can have ayaz double book the patient

## 2020-01-31 DIAGNOSIS — G20 PARKINSON'S DISEASE (HCC): ICD-10-CM

## 2020-02-12 ENCOUNTER — OFFICE VISIT (OUTPATIENT)
Dept: INTERNAL MEDICINE CLINIC | Facility: CLINIC | Age: 75
End: 2020-02-12
Payer: COMMERCIAL

## 2020-02-12 ENCOUNTER — TELEPHONE (OUTPATIENT)
Dept: ADMINISTRATIVE | Facility: OTHER | Age: 75
End: 2020-02-12

## 2020-02-12 ENCOUNTER — TELEPHONE (OUTPATIENT)
Dept: NEUROLOGY | Facility: CLINIC | Age: 75
End: 2020-02-12

## 2020-02-12 VITALS
HEART RATE: 78 BPM | WEIGHT: 149.2 LBS | OXYGEN SATURATION: 97 % | SYSTOLIC BLOOD PRESSURE: 100 MMHG | HEIGHT: 63 IN | BODY MASS INDEX: 26.44 KG/M2 | DIASTOLIC BLOOD PRESSURE: 70 MMHG

## 2020-02-12 DIAGNOSIS — G20 PARKINSON'S DISEASE (HCC): ICD-10-CM

## 2020-02-12 DIAGNOSIS — F33.41 RECURRENT MAJOR DEPRESSIVE DISORDER, IN PARTIAL REMISSION (HCC): ICD-10-CM

## 2020-02-12 DIAGNOSIS — M81.0 SENILE OSTEOPOROSIS: ICD-10-CM

## 2020-02-12 DIAGNOSIS — E03.9 ACQUIRED HYPOTHYROIDISM: ICD-10-CM

## 2020-02-12 DIAGNOSIS — F41.9 ANXIETY: ICD-10-CM

## 2020-02-12 DIAGNOSIS — Z00.00 MEDICARE ANNUAL WELLNESS VISIT, INITIAL: ICD-10-CM

## 2020-02-12 DIAGNOSIS — E55.9 VITAMIN D DEFICIENCY: ICD-10-CM

## 2020-02-12 DIAGNOSIS — E78.2 MIXED HYPERLIPIDEMIA: Primary | ICD-10-CM

## 2020-02-12 PROBLEM — E66.3 OVERWEIGHT (BMI 25.0-29.9): Status: ACTIVE | Noted: 2020-02-12

## 2020-02-12 PROCEDURE — 1170F FXNL STATUS ASSESSED: CPT | Performed by: INTERNAL MEDICINE

## 2020-02-12 PROCEDURE — 1125F AMNT PAIN NOTED PAIN PRSNT: CPT | Performed by: INTERNAL MEDICINE

## 2020-02-12 PROCEDURE — 3008F BODY MASS INDEX DOCD: CPT | Performed by: INTERNAL MEDICINE

## 2020-02-12 PROCEDURE — 99214 OFFICE O/P EST MOD 30 MIN: CPT | Performed by: INTERNAL MEDICINE

## 2020-02-12 PROCEDURE — 1036F TOBACCO NON-USER: CPT | Performed by: INTERNAL MEDICINE

## 2020-02-12 PROCEDURE — 1160F RVW MEDS BY RX/DR IN RCRD: CPT | Performed by: INTERNAL MEDICINE

## 2020-02-12 PROCEDURE — G0439 PPPS, SUBSEQ VISIT: HCPCS | Performed by: INTERNAL MEDICINE

## 2020-02-12 PROCEDURE — 4040F PNEUMOC VAC/ADMIN/RCVD: CPT | Performed by: INTERNAL MEDICINE

## 2020-02-12 RX ORDER — ESCITALOPRAM OXALATE 20 MG/1
20 TABLET ORAL DAILY
Qty: 90 TABLET | Refills: 1 | Status: SHIPPED | OUTPATIENT
Start: 2020-02-12 | End: 2020-09-02 | Stop reason: SDUPTHER

## 2020-02-12 RX ORDER — IBANDRONATE SODIUM 150 MG/1
150 TABLET, FILM COATED ORAL
Qty: 3 TABLET | Refills: 3 | Status: SHIPPED | OUTPATIENT
Start: 2020-02-12 | End: 2020-06-24

## 2020-02-12 NOTE — TELEPHONE ENCOUNTER
Pt concern that she would not be in the group for the new medication trial due to her appt being on April 6   Pt requested a call from provider, sent message to Dr Luigi Hampton

## 2020-02-12 NOTE — TELEPHONE ENCOUNTER
Upon review of the In Basket request we were able to locate, review, and update the patient chart as requested for DEXA Scan  Any additional questions or concerns should be emailed to the Practice Liaisons via [email or web address] email, please do not reply via In Basket      Thank you  Shawn Dietz

## 2020-02-12 NOTE — PROGRESS NOTES
Assessment and Plan:     Problem List Items Addressed This Visit        Endocrine    Acquired hypothyroidism       Nervous and Auditory    Parkinson's disease ([facility] [address] )       Musculoskeletal and Integument    Senile osteoporosis    Relevant Medications    ibandronate (BONIVA) 150 MG tablet       Other    Mixed hyperlipidemia - Primary    Relevant Orders    CBC and differential    Comprehensive metabolic panel    Lipid panel    TSH, 3rd generation    Vitamin D deficiency    Relevant Orders    Vitamin D 25 hydroxy    Anxiety    Recurrent major depressive disorder, in partial remission (HCC)    Relevant Medications    escitalopram (LEXAPRO) 20 mg tablet      Other Visit Diagnoses     Medicare annual wellness visit, initial               Preventive health issues were discussed with patient, and age appropriate screening tests were ordered as noted in patient's After Visit Summary  Personalized health advice and appropriate referrals for health education or preventive services given if needed, as noted in patient's After Visit Summary       History of Present Illness:     Patient presents for Medicare Annual Wellness visit    Patient Care Team:  Ludwig Malcolm MD as PCP - General (Internal Medicine)  Ludwig Malcolm MD as PCP - [address] [place] [address] (E)  MD Devi Coppola PA-C as Physician Assistant (Gastroenterology)  Rama Arango MD as Endoscopist     Problem List:     Patient Active Problem List   Diagnosis    Parkinson's disease [facility])    Scoliosis    Low back pain    Senile osteoporosis    Acquired hypothyroidism    Patient refuses to disclose advance directive information    Elevated antinuclear antibody (RAS) level    Family hx-breast malignancy    Mixed hyperlipidemia    Rosacea    Trochanteric bursitis    Urinary incontinence    Vitamin D deficiency    Irritable bowel syndrome with both constipation and diarrhea    REM behavioral disorder    Anxiety    Recurrent major depressive disorder, in partial remission ([facility] )    Overweight (BMI 25 0-29  9)      Past Medical and Surgical History:     Past Medical History:   Diagnosis Date    Anxiety     Disease of thyroid gland     Generalized anxiety disorder 4/23/2015    Hepatic disease     Infectious viral hepatitis     Liver disease     Osteoporosis     Parkinson's disease ([facility] )     Rosacea     Shortness of breath      Past Surgical History:   Procedure Laterality Date    FOOT SURGERY Bilateral     HYSTERECTOMY      IN ESOPHAGOGASTRODUODENOSCOPY TRANSORAL DIAGNOSTIC N/A 9/5/2018    Procedure: EGD AND COLONOSCOPY;  Surgeon: Jonathan Mosquera MD;  Location: [facility]; Service: Gastroenterology    URETHRA SURGERY      laparoscopic urethral suspension for stress incontinence      Family History:     Family History   Problem Relation Age of Onset    Lung cancer Mother     Dementia Mother     Cancer Mother     Lung cancer Father     Cancer Father     Esophageal cancer Brother     Cancer Brother     Cavazos's esophagus Brother     Esophageal cancer Family     Heart disease Family     Stroke Family         syndrome      Social History:        Social History     Socioeconomic History    Marital status: Significant Other     Spouse name: None    Number of children: None    Years of education: Master's Degree    Highest education level: None   Occupational History     Comment: Retired   Social Needs    Financial resource strain: None    Food insecurity:     Worry: None     Inability: None    Transportation needs:     Medical: None     Non-medical: None   Tobacco Use    Smoking status: Never Smoker    Smokeless tobacco: Never Used   Substance and Sexual Activity    Alcohol use: Not Currently     Alcohol/week: 1 0 standard drinks     Types: 1 Glasses of wine per week     Frequency: Monthly or less     Drinks per session: 1 or 2     Binge frequency: Never     Comment: Alcohol use per Allscripts    Drug use:  No  Sexual activity: Never   Lifestyle    Physical activity:     Days per week: 1 day     Minutes per session: 10 min    Stress: Not at all   Relationships    Social connections:     Talks on phone: None     Gets together: None     Attends Mandaen service: None     Active member of club or organization: None     Attends meetings of clubs or organizations: None     Relationship status: None    Intimate partner violence:     Fear of current or ex partner: None     Emotionally abused: None     Physically abused: None     Forced sexual activity: None   Other Topics Concern    None   Social History Narrative    Denied:  History of caffeine use      Medications and Allergies:     Current Outpatient Medications   Medication Sig Dispense Refill    aspirin (ECOTRIN LOW STRENGTH) 81 mg EC tablet Take 81 mg by mouth daily      carbidopa-levodopa (SINEMET)  mg per tablet Take 1 and half tablets 4 times a day 540 tablet 3    Cholecalciferol (VITAMIN D) 2000 units tablet 1 daily      Cranberry 1000 MG CAPS Take 4,200 mg by mouth daily       cyanocobalamin (VITAMIN B-12) 1,000 mcg tablet Take 1,000 mcg by mouth daily      escitalopram (LEXAPRO) 20 mg tablet Take 1 tablet (20 mg total) by mouth daily 90 tablet 1    Homeopathic Products (PROSACEA) GEL Apply topically as needed       levothyroxine 50 mcg tablet TAKE 1 TABLET BY MOUTH ONCE DAILY 90 tablet 2    Multiple Vitamin (MULTIVITAMINS PO) daily      polyethylene glycol (MIRALAX) 17 g packet Take 17 g by mouth as needed       rasagiline (AZILECT) 1 MG TAKE 1 TABLET BY MOUTH ONCE DAILY 90 each 3    rotigotine (NEUPRO) 4 MG/24HR Place 1 patch on the skin daily 30 patch 5    ibandronate (BONIVA) 150 MG tablet Take 1 tablet (150 mg total) by mouth every 30 (thirty) days 3 tablet 3    Probiotic Product (ALIGN) CHEW Chew daily       Wheat Dextrin (BENEFIBER DRINK MIX PO) Take by mouth as needed        No current facility-administered medications for this visit  Allergies   Allergen Reactions    Statins Other (See Comments)     jaundice    Naproxen Hives    Simvastatin Other (See Comments)     Jaundice    Dust Mite Extract Other (See Comments)     Per pt states does not know the type of reaction    Pollen Extract Sneezing and Nasal Congestion      Immunizations:     Immunization History   Administered Date(s) Administered    H1N1 Inj 12/06/2009    H1N1, All Formulations 12/14/2009, 12/14/2009    Hep A, adult 06/06/1996, 05/22/1997    INFLUENZA 12/04/2006, 11/07/2007, 10/03/2008, 02/12/2010, 09/15/2010, 09/21/2011, 10/25/2012, 11/01/2013, 10/15/2014, 10/05/2015, 10/02/2016, 11/13/2017, 09/18/2018    Influenza Quadrivalent 3 years and older 10/05/2015    Influenza Quadrivalent 6 mos and older IM 09/18/2018    Influenza TIV (IM) 12/04/2006, 11/07/2007, 10/03/2008, 02/12/2010, 09/15/2010, 09/21/2011, 10/25/2012, 11/01/2013, 10/15/2014    Pneumococcal Conjugate 13-Valent 05/03/2016    Pneumococcal Polysaccharide PPV23 03/17/2011    Td (adult), adsorbed 06/10/2002    Tdap 10/25/2012    Zoster 09/12/2012    influenza, trivalent, adjuvanted 10/14/2019      Health Maintenance:         Topic Date Due    DXA SCAN  1945    CRC Screening: Colonoscopy  09/05/2028    Hepatitis C Screening  Completed     There are no preventive care reminders to display for this patient  Medicare Health Risk Assessment:     /70   Pulse 78   Ht 5' 3" (1 6 m)   Wt 67 7 kg (149 lb 3 2 oz)   SpO2 97%   BMI 26 43 kg/m²      Chipley is here for her Initial Wellness visit  Health Risk Assessment:   Patient rates overall health as fair  Patient feels that their physical health rating is slightly worse  Eyesight was rated as slightly worse  Hearing was rated as same  Patient feels that their emotional and mental health rating is slightly better  Pain experienced in the last 7 days has been none       Depression Screening:   PHQ-2 Score: 2  PHQ-9 Score: 3 Fall Risk Screening: In the past year, patient has experienced: history of falling in past year    Number of falls: 1  Injured during fall?: No    Feels unsteady when standing or walking?: No    Worried about falling?: Yes      Urinary Incontinence Screening:   Patient has leaked urine accidently in the last six months  Home Safety:  Patient does not have trouble with stairs inside or outside of their home  Patient has working smoke alarms and has working carbon monoxide detector  Home safety hazards include: none  Nutrition:   Current diet is Regular  Medications:   Patient is currently taking over-the-counter supplements  OTC medications include: see medication list  Patient is able to manage medications  Activities of Daily Living (ADLs)/Instrumental Activities of Daily Living (IADLs):   Walk and transfer into and out of bed and chair?: Yes  Dress and groom yourself?: Yes    Bathe or shower yourself?: Yes    Feed yourself?  Yes  Do your laundry/housekeeping?: Yes  Manage your money, pay your bills and track your expenses?: Yes  Make your own meals?: Yes    Do your own shopping?: No    Previous Hospitalizations:   Any hospitalizations or ED visits within the last 12 months?: No      Advance Care Planning:   Living will: Yes    Advanced directive: Yes    End of Life Decisions reviewed with patient: Yes      Cognitive Screening:   Provider or family/friend/caregiver concerned regarding cognition?: No    PREVENTIVE SCREENINGS      Cardiovascular Screening:    General: Screening Not Indicated and History Lipid Disorder      Diabetes Screening:     General: Screening Current      Colorectal Cancer Screening:     General: Screening Current      Breast Cancer Screening:     General: Screening Current      Cervical Cancer Screening:    General: Screening Not Indicated      Osteoporosis Screening:    General: Screening Not Indicated and History Osteoporosis      Abdominal Aortic Aneurysm (AAA) Screening:        General: Screening Not Indicated      Lung Cancer Screening:     General: Screening Not Indicated      Hepatitis C Screening:    General: Screening Current    Other Counseling Topics:   Alcohol use counseling, car/seat belt/driving safety, skin self-exam, sunscreen and calcium and vitamin D intake and regular weightbearing exercise         Mariama Jacobs MD

## 2020-02-12 NOTE — TELEPHONE ENCOUNTER
----- Message from Caroline Xavier sent at 2/12/2020 12:18 PM EST -----  Regarding: Dexa  Contact: [phone]  02/12/20 12:19 PM    Hello, our patient Razia Day has had DEXA Scan completed/performed  Please assist in updating the patient chart by pulling the Care Everywhere (CE) document  The date of service is 11/2019       Thank you,  Guillermo Apodaca MA  [facility]  [address]

## 2020-02-12 NOTE — TELEPHONE ENCOUNTER
Not sure what that means exactly  If we start any clinical trials here we will likely send something out to the Parkinson's mailing list  If she would like I can send her contact information to the research coordinator at [facility] if she wants to get involved in something now

## 2020-02-12 NOTE — PROGRESS NOTES
INTERNAL MEDICINE OFFICE VISIT  [facility]  [address], [facility] [facility], [address] [facility]  Tel: [phone]      NAME: Jayde Pena  AGE: 69 y o  SEX: female  : 1945   MRN: [id number]    DATE: 2020  TIME: 7:19 PM      Assessment and Plan:  1  Mixed hyperlipidemia   she was told to cut down on the fat intake in her diet  - CBC and differential; Future  - Comprehensive metabolic panel; Future  - Lipid panel; Future  - TSH, 3rd generation; Future    2  Acquired hypothyroidism    Continue levothyroxine at the same dose    3  Recurrent major depressive disorder, in partial remission (HCC)   continue Lexapro  - escitalopram (LEXAPRO) 20 mg tablet; Take 1 tablet (20 mg total) by mouth daily  Dispense: 90 tablet; Refill: 1    4  Anxiety   continue Lexapro    5  Vitamin D deficiency   continue vitamin-D supplement  - Vitamin D 25 hydroxy; Future    6  Parkinson's disease ([address] )   continue Sinemet and follow up with Neurology  She wants to take part in a clinical trial    7  Senile osteoporosis   was started on  Boniva and told to continue taking the calcium and vitamin-D  - ibandronate (BONIVA) 150 MG tablet; Take 1 tablet (150 mg total) by mouth every 30 (thirty) days  Dispense: 3 tablet; Refill: 3    8  Medicare annual wellness visit, initial        - Counseling Documentation: patient was counseled regarding: diagnostic results, instructions for management, risk factor reductions, prognosis, patient and family education, risks and benefits of treatment options and importance of compliance with treatment  - Medication Side Effects: Adverse side effects of medications were reviewed with the patient/guardian today  Return for follow up visit in  4 months or earlier, if needed        Chief Complaint:  Chief Complaint   Patient presents with    Medicare Wellness Visit         History of Present Illness:    hyperlipidemia- does not take any medication for it Hypothyroidism -TSH is stable   Depression and anxiety -has been taking medication and still has a lot of symptoms as she has a lot of issues at home especially with her daughter  Vitamin-D deficiency -stable   Parkinson's disease -follows up with Neurology and takes medication regularly  Osteoporosis - she was presently not taking any medication      Active Problem List:  Patient Active Problem List   Diagnosis    [facility]    Scoliosis    Low back pain    Senile osteoporosis    Acquired hypothyroidism    Patient refuses to disclose advance directive information    Elevated antinuclear antibody (RAS) level    Family hx-breast malignancy    Mixed hyperlipidemia    Rosacea    Trochanteric bursitis    Urinary incontinence    Vitamin D deficiency    Irritable bowel syndrome with both constipation and diarrhea    REM behavioral disorder    Anxiety    Recurrent major depressive disorder, in partial remission ([facility] )    Overweight (BMI 25 0-29  9)         Past Medical History:  Past Medical History:   Diagnosis Date    Anxiety     Disease of thyroid gland     Generalized anxiety disorder 4/23/2015    Hepatic disease     Infectious viral hepatitis     Liver disease     Osteoporosis     Parkinson's disease ([facility] )     Rosacea     Shortness of breath          Past Surgical History:  Past Surgical History:   Procedure Laterality Date    FOOT SURGERY Bilateral     HYSTERECTOMY      PA ESOPHAGOGASTRODUODENOSCOPY TRANSORAL DIAGNOSTIC N/A 9/5/2018    Procedure: EGD AND COLONOSCOPY;  Surgeon: Anette Unger MD;  Location: [facility];   Service: Gastroenterology    URETHRA SURGERY      laparoscopic urethral suspension for stress incontinence         Family History:  Family History   Problem Relation Age of Onset    Lung cancer Mother     Dementia Mother     Cancer Mother     Lung cancer Father     Cancer Father     Esophageal cancer Brother     Cancer Brother     Cavazos's esophagus Brother     Esophageal cancer Family     Heart disease Family     Stroke Family         syndrome         Social History:  Social History     Socioeconomic History    Marital status: Significant Other     Spouse name: None    Number of children: None    Years of education: Master's Degree    Highest education level: None   Occupational History     Comment: Retired   Social Needs    Financial resource strain: None    Food insecurity:     Worry: None     Inability: None    Transportation needs:     Medical: None     Non-medical: None   Tobacco Use    Smoking status: Never Smoker    Smokeless tobacco: Never Used   Substance and Sexual Activity    Alcohol use: Not Currently     Alcohol/week: 1 0 standard drinks     Types: 1 Glasses of wine per week     Frequency: Monthly or less     Drinks per session: 1 or 2     Binge frequency: Never     Comment: Alcohol use per Allscripts    Drug use: No    Sexual activity: Never   Lifestyle    Physical activity:     Days per week: 1 day     Minutes per session: 10 min    Stress: Not at all   Relationships    Social connections:     Talks on phone: None     Gets together: None     Attends Mormonism service: None     Active member of club or organization: None     Attends meetings of clubs or organizations: None     Relationship status: None    Intimate partner violence:     Fear of current or ex partner: None     Emotionally abused: None     Physically abused: None     Forced sexual activity: None   Other Topics Concern    None   Social History Narrative    Denied:  History of caffeine use         Allergies:   Allergies   Allergen Reactions    Statins Other (See Comments)     jaundice    Naproxen Hives    Simvastatin Other (See Comments)     Jaundice    Dust Mite Extract Other (See Comments)     Per pt states does not know the type of reaction    Pollen Extract Sneezing and Nasal Congestion         Medications:    Current Outpatient Medications:     aspirin (ECOTRIN LOW STRENGTH) 81 mg EC tablet, Take 81 mg by mouth daily, Disp: , Rfl:     carbidopa-levodopa (SINEMET)  mg per tablet, Take 1 and half tablets 4 times a day, Disp: 540 tablet, Rfl: 3    Cholecalciferol (VITAMIN D) 2000 units tablet, 1 daily, Disp: , Rfl:     Cranberry 1000 MG CAPS, Take 4,200 mg by mouth daily , Disp: , Rfl:     cyanocobalamin (VITAMIN B-12) 1,000 mcg tablet, Take 1,000 mcg by mouth daily, Disp: , Rfl:     escitalopram (LEXAPRO) 20 mg tablet, Take 1 tablet (20 mg total) by mouth daily, Disp: 90 tablet, Rfl: 1    Homeopathic Products (PROSACEA) GEL, Apply topically as needed , Disp: , Rfl:     levothyroxine 50 mcg tablet, TAKE 1 TABLET BY MOUTH ONCE DAILY, Disp: 90 tablet, Rfl: 2    Multiple Vitamin (MULTIVITAMINS PO), daily, Disp: , Rfl:     polyethylene glycol (MIRALAX) 17 g packet, Take 17 g by mouth as needed , Disp: , Rfl:     rasagiline (AZILECT) 1 MG, TAKE 1 TABLET BY MOUTH ONCE DAILY, Disp: 90 each, Rfl: 3    rotigotine (NEUPRO) 4 MG/24HR, Place 1 patch on the skin daily, Disp: 30 patch, Rfl: 5    ibandronate (BONIVA) 150 MG tablet, Take 1 tablet (150 mg total) by mouth every 30 (thirty) days, Disp: 3 tablet, Rfl: 3    Probiotic Product (ALIGN) CHEW, Chew daily , Disp: , Rfl:     Wheat Dextrin (BENEFIBER DRINK MIX PO), Take by mouth as needed , Disp: , Rfl:       The following portions of the patient's history were reviewed and updated as appropriate: past medical history, past surgical history, family history, social history, allergies, current medications and active problem list       Review of Systems:  Constitutional: Denies fever, chills, weight gain, weight loss, fatigue  Eyes: Denies eye redness, eye discharge, double vision, change in visual acuity  ENT: Denies hearing loss, tinnitus, sneezing, nasal congestion, nasal discharge, sore throat   Respiratory: Denies cough, expectoration, hemoptysis, shortness of breath, wheezing  Cardiovascular: Denies chest pain, palpitations, lower extremity swelling, orthopnea, PND  Gastrointestinal: Denies abdominal pain, heartburn, nausea, vomiting, hematemesis, diarrhea, bloody stools  Genito-Urinary: Denies dysuria, frequency, difficulty in micturition, nocturia, incontinence  Musculoskeletal: Denies back pain, joint pain, muscle pain  Neurologic: Denies confusion, lightheadedness, syncope, headache, focal weakness, sensory changes, seizures  Endocrine: Denies polyuria, polydipsia, temperature intolerance  Allergy and Immunology: Denies hives, insect bite sensitivity  Hematological and Lymphatic: Denies bleeding problems, swollen glands   Psychological: has depression,  anxiety, panic, mood swings  Dermatological: Denies pruritus, rash, skin lesion changes      Vitals:  Vitals:    02/12/20 1222   BP: 100/70   Pulse: 78   SpO2: 97%       Body mass index is 26 43 kg/m²  Weight (last 2 days)     Date/Time   Weight    02/12/20 1222   67 7 (149 2)                Physical Examination:  General: Patient is not in acute distress  Awake, alert, responding to commands  No weight gain or loss  Head: Normocephalic  Atraumatic  Eyes: Conjunctiva and lids with no swelling, erythema or discharge  Both pupils normal sized, round and reactive to light  Sclera nonicteric  ENT: External examination of nose and ear normal  Otoscopic examination shows translucent tympanic membranes with patent canals without erythema  Oropharynx moist with no erythema, edema, exudate or lesions  Neck: Supple  JVP not raised  Trachea midline  No masses  No thyromegaly  Lungs: No signs of increased work of breathing or respiratory distress  Bilateral bronchovascular breath sounds with no crackles or rhonchi  Chest wall: No tenderness  Cardiovascular: Normal PMI  No thrills  Regular rate and rhythm  S1 and S2 normal  No murmur, rub or gallop  Gastrointestinal: Abdomen soft, nontender  No guarding or rigidity   Liver and spleen not palpable  Bowel sounds present  Neurologic: Cranial nerves II-XII intact  Cortical functions normal  Motor system - Reflexes 2+ and symmetrical  Sensations normal  Musculoskeletal: Gait normal  No joint tenderness  Integumentary: Skin normal with no rash or lesions  Lymphatic: No palpable lymph nodes in neck, axilla or groin  Extremities: No clubbing, cyanosis, edema or varicosities  Psychological: Judgement and insight normal  Depressed      Laboratory Results:  CBC with diff:   Lab Results   Component Value Date    WBC 6 99 12/31/2019    RBC 4 98 12/31/2019    HGB 14 7 12/31/2019    HCT 46 8 (H) 12/31/2019    MCV 94 12/31/2019    MCH 29 5 12/31/2019    RDW 12 9 12/31/2019     12/31/2019       CMP:  Lab Results   Component Value Date    CREATININE 0 78 12/31/2019    BUN 16 12/31/2019    K 4 1 12/31/2019     12/31/2019    CO2 29 12/31/2019    ALKPHOS 77 12/31/2019    ALT 26 12/31/2019    AST 15 12/31/2019       Lab Results   Component Value Date    MG 2 4 07/25/2018       No results found for: TROPONINI, CKMB, CKTOTAL    Lipid Profile:   No results found for: CHOL  Lab Results   Component Value Date    HDL 60 12/31/2019    HDL 61 10/26/2019     Lab Results   Component Value Date    LDLCALC 144 (H) 12/31/2019    LDLCALC 141 (H) 10/26/2019     Lab Results   Component Value Date    TRIG 159 (H) 12/31/2019    TRIG 77 10/26/2019       Imaging Results:   Dexa Scan  This Care Everywhere (CE) document can be found within the Chart Review: Encounters Tab  Linking this document to the order is not available at this time          Health Maintenance:  Health Maintenance   Topic Date Due    BMI: Followup Plan  02/23/1964    DXA SCAN  11/08/2019    Fall Risk  02/12/2021    Medicare Annual Wellness Visit (AWV)  02/12/2021    BMI: Adult  02/12/2021    DTaP,Tdap,and Td Vaccines (2 - Td) 10/25/2022    CRC Screening: Colonoscopy  09/05/2028    Hepatitis C Screening  Completed    Influenza Vaccine  Completed  Pneumococcal Vaccine: 65+ Years  Completed    Pneumococcal Vaccine: Pediatrics (0 to 5 Years) and At-Risk Patients (6 to 59 Years)  Aged Out    HIB Vaccine  Aged Out    Hepatitis B Vaccine  Aged Out    IPV Vaccine  Aged Out    Hepatitis A Vaccine  Aged Out    Meningococcal ACWY Vaccine  Aged Out    HPV Vaccine  Aged [employer] History   Administered Date(s) Administered    H1N1 Inj 12/06/2009    H1N1, All Formulations 12/14/2009, 12/14/2009    Hep A, adult 06/06/1996, 05/22/1997    INFLUENZA 12/04/2006, 11/07/2007, 10/03/2008, 02/12/2010, 09/15/2010, 09/21/2011, 10/25/2012, 11/01/2013, 10/15/2014, 10/05/2015, 10/02/2016, 11/13/2017, 09/18/2018    Influenza Quadrivalent 3 years and older 10/05/2015    Influenza Quadrivalent 6 mos and older IM 09/18/2018    Influenza TIV (IM) 12/04/2006, 11/07/2007, 10/03/2008, 02/12/2010, 09/15/2010, 09/21/2011, 10/25/2012, 11/01/2013, 10/15/2014    Pneumococcal Conjugate 13-Valent 05/03/2016    Pneumococcal Polysaccharide PPV23 03/17/2011    Td (adult), adsorbed 06/10/2002    Tdap 10/25/2012    Zoster 09/12/2012    influenza, trivalent, adjuvanted 10/14/2019         Luz Marina Khan MD  2/12/2020,7:19 PM

## 2020-02-12 NOTE — TELEPHONE ENCOUNTER
----- Message from Sophie Sanford sent at 2/12/2020  4:16 PM EST -----  Good afternoon Mary Hull     Be advised I spoke with your patient Grabiel Loco she would like to be in the group for the new medication trial, her appointment with you is not till April 6 pt would like to know if it is possible that she could be in that group  Patient requested a call back #[phone]      Thank Yanna   Neurology [address] [place] [address]

## 2020-02-13 NOTE — TELEPHONE ENCOUNTER
Called and spoke to patient  Patient would like her contact info sent to the research coordinator at [place]  Please advise

## 2020-02-17 NOTE — TELEPHONE ENCOUNTER
I spoke to the coordinator there  She will most likely reach out  She didn't think there was a trial that Anamaria Patel would be a candidate for at this point so may be some delay to contacting her  Nothing for her to do

## 2020-02-17 NOTE — TELEPHONE ENCOUNTER
Pt called asking if her information was sent to research coordination at [facility]?    Asking if there is anything she needs to do on her end?

## 2020-03-10 ENCOUNTER — OFFICE VISIT (OUTPATIENT)
Dept: NEUROLOGY | Facility: CLINIC | Age: 75
End: 2020-03-10
Payer: COMMERCIAL

## 2020-03-10 VITALS
BODY MASS INDEX: 26.97 KG/M2 | HEART RATE: 83 BPM | SYSTOLIC BLOOD PRESSURE: 128 MMHG | WEIGHT: 152.2 LBS | DIASTOLIC BLOOD PRESSURE: 76 MMHG | HEIGHT: 63 IN

## 2020-03-10 DIAGNOSIS — M54.50 CHRONIC BILATERAL LOW BACK PAIN WITHOUT SCIATICA: ICD-10-CM

## 2020-03-10 DIAGNOSIS — G47.52 REM BEHAVIORAL DISORDER: ICD-10-CM

## 2020-03-10 DIAGNOSIS — G20 PARKINSON'S DISEASE (HCC): Primary | ICD-10-CM

## 2020-03-10 DIAGNOSIS — G89.29 CHRONIC BILATERAL LOW BACK PAIN WITHOUT SCIATICA: ICD-10-CM

## 2020-03-10 PROCEDURE — 1160F RVW MEDS BY RX/DR IN RCRD: CPT | Performed by: PSYCHIATRY & NEUROLOGY

## 2020-03-10 PROCEDURE — 99214 OFFICE O/P EST MOD 30 MIN: CPT | Performed by: PSYCHIATRY & NEUROLOGY

## 2020-03-10 PROCEDURE — 1036F TOBACCO NON-USER: CPT | Performed by: PSYCHIATRY & NEUROLOGY

## 2020-03-10 PROCEDURE — 4040F PNEUMOC VAC/ADMIN/RCVD: CPT | Performed by: PSYCHIATRY & NEUROLOGY

## 2020-03-10 PROCEDURE — 3008F BODY MASS INDEX DOCD: CPT | Performed by: PSYCHIATRY & NEUROLOGY

## 2020-03-10 PROCEDURE — 1170F FXNL STATUS ASSESSED: CPT | Performed by: PSYCHIATRY & NEUROLOGY

## 2020-03-10 NOTE — PATIENT INSTRUCTIONS
Lets keep the medications unchanged for the time being       -  www [employer] org ([address] for [employer]) - Order the "Every Victory Counts" gabriel under the "resources" tab   Or visit [employer] or call [phone]   - Contact the [address] [place] [address] for an "Aware in care kit" @ [phone] (this is a kit to take with you if you ever have to go to the hospital)

## 2020-03-10 NOTE — ASSESSMENT & PLAN NOTE
67-year-old woman with Parkinson's disease presents in follow-up  Again the patient's exam is quite subtle and her parkinsonism appears to be very well controlled with her current medications  She does report some new freezing of gait and we discussed some strategies to overcome this  We again spent a good deal of time discussing the patient's daughter and the impact that stress and anxiety can have on Parkinson's symptoms  She does report some new and worsening back pain for which will refer her to physical therapy  I encouraged her to stay as physically active as possible

## 2020-03-10 NOTE — PROGRESS NOTES
Patient ID: Panfilo Arthur is a 77 y o  female  Assessment/Plan:    REM behavioral disorder  Events are rare now, not bothersome  We will hold off on restarting melatonin  Parkinson's disease [facility])  23-year-old woman with Parkinson's disease presents in follow-up  Again the patient's exam is quite subtle and her parkinsonism appears to be very well controlled with her current medications  She does report some new freezing of gait and we discussed some strategies to overcome this  We again spent a good deal of time discussing the patient's daughter and the impact that stress and anxiety can have on Parkinson's symptoms  She does report some new and worsening back pain for which will refer her to physical therapy  I encouraged her to stay as physically active as possible  Diagnoses and all orders for this visit:    Parkinson's disease ([address] )    Chronic bilateral low back pain without sciatica  -     Ambulatory referral to Physical Therapy; Future    REM behavioral disorder           Subjective:    HPI    I had the pleasure of seeing your patient, Panfilo Arthur in the Movement Disorders Clinic at the [facility]  Eric Hines is a 77year-old right-handed woman with levodopa-responsive Parkinson's disease, symptom onset in 2011 with left foot tremor, now complicated by non-motor wearing-off and slight dyskinesias  In Fall of 2011 she noted some balance trouble and later developed micrographia  She was started on Azilect 1mg with little notable improvement  Neupro patch later added  Sinemet was added later and has been titrated up to 1 5 tabs 4 times per day      Current medications and timing:  - Azilect 1mg in the evening   - Sinemet 25/100; 4 times per day (2/1 5/1 5/1) at 8am, noon, 3:30pm, +/-7 pm roughly  - Neupro Patch 4mg evening         Interval history:  Just moved  Recent birthday   Has been exhausted which makes everything harder     The fatigue is her most bothersome symptom  Had been less active recently  Want to go to PT  Not acting out her dreams much  Rare now  A little freezing on initiation - new     Discussed issues surrounding her daughter again  Borderline personality      Regarding medication complications:  Wearing off: lasts about 3 5 hours   Dyskinesias: mild, not bothersome   Impulse control issues: no       The following portions of the patient's history were reviewed and updated as appropriate: allergies, current medications, past family history, past medical history, past social history, past surgical history and problem list       Objective:  Blood pressure 128/76, pulse 83, height 5' 3" (1 6 m), weight 69 kg (152 lb 3 2 oz)  Physical Exam    Neurological Exam    GENERAL MEDICAL EXAMINATION:  General appearance: alert, in no apparent distress  Appropriately dressed and groomed  Conversing and interacting appropriately  Eyes: Sclera are non-injected  Ears, nose, Mouth, Throat: Mucous membranes are moist    Resp: Breathing comfortably on RA   Musculoskeletal: No evidence of deformities  No contractures  No Edema  Skin: No visible rashes  Warm and well perfused  Psych: normal and appropriate affect     Mental Status:  Alert and oriented to person place and time  Able to relate history without difficulty  Attentive to conversation  Language is fluent and appropriate with normal prosody  There were no paraphasic errors  Speech was not dysarthric  Able to follow both midline and appendicular commands  General Neurology examination:   EOMI, Face activates symmetrically       UPDRS motor:                              Time since last dose:  1 5     Speech  0     Facial Expression  1     Rigidity - Neck  0     Rigidity - Upper Extremity (Right)  0     Rigidity - Upper Extremity (Left)   0     Rigidity - Lower Extremity (Right)  0     Rigidity - Lower Extremity (Left)   1     Finger Taps (Right)   1     Finger Taps (Left)   0     Hand Movement (Right)  0     Hand Movement (Left)   0     Pronation/Supination (Right)  0     Pronation/Supination (Left)   0     Toe Tapping (Right) 0     Toe Tapping (Left) 1     Leg Agility (Right)  0     Leg Agility (Left)   1     Arising from Chair   1     Gait   1     Freezing of Gait 1 New; once when turning    Postural Stability        Posture 1     Global spontaneity of movement 2     Postural Tremor (Right) 0     Postural Tremor (Left) 0     Kinetic Tremor (Right)  0     Kinetic Tremor (Left)  0     Rest tremor amplitude RUE 0     Rest tremor amplitude LUE 0     Rest tremor amplitude RLE 0     Reset tremor amplitude LLE 0     Lip/Jaw Tremor  0     Consistency of tremor 0     Motor Exam Total:            ROS:  Review of Systems   Constitutional: Positive for fatigue  Negative for appetite change and fever  HENT: Positive for congestion, sinus pressure and sinus pain  Negative for hearing loss, tinnitus, trouble swallowing and voice change  Eyes: Negative  Negative for photophobia and pain  Respiratory: Negative  Negative for shortness of breath  Cardiovascular: Negative  Negative for palpitations  Gastrointestinal: Negative  Negative for nausea and vomiting  Endocrine: Negative  Negative for cold intolerance and heat intolerance  Genitourinary: Negative  Negative for dysuria, frequency and urgency  Musculoskeletal: Positive for back pain and gait problem  Negative for myalgias and neck pain  Skin: Negative  Negative for rash  Neurological: Negative for dizziness, tremors, seizures, syncope, facial asymmetry, speech difficulty, weakness, light-headedness, numbness and headaches  Hematological: Negative  Does not bruise/bleed easily  Psychiatric/Behavioral: Negative  Negative for confusion, hallucinations and sleep disturbance  The above ROS was reviewed and updated       Carol Hernández MD  Medical Director   [facility]  Movement and Memory Specialist

## 2020-03-11 ENCOUNTER — TELEPHONE (OUTPATIENT)
Dept: NEUROLOGY | Facility: CLINIC | Age: 75
End: 2020-03-11

## 2020-03-11 NOTE — TELEPHONE ENCOUNTER
LMOM letting patient know I did schedule her appt for 6/1 at 11 a m  In the [facility]

## 2020-03-29 ENCOUNTER — NURSE TRIAGE (OUTPATIENT)
Dept: OTHER | Facility: OTHER | Age: 75
End: 2020-03-29

## 2020-03-29 ENCOUNTER — TELEPHONE (OUTPATIENT)
Dept: OTHER | Facility: OTHER | Age: 75
End: 2020-03-29

## 2020-03-29 NOTE — TELEPHONE ENCOUNTER
Spoke with Dr John Russo and discussed patient's symptoms  Stated patient needs to make an appt with PCP tomorrow for formal testing

## 2020-03-29 NOTE — TELEPHONE ENCOUNTER
Reason for Disposition   Urinating more frequently than usual (i e , frequency)    Answer Assessment - Initial Assessment Questions  1  SYMPTOM: "What's the main symptom you're concerned about?" (e g , frequency, incontinence)       Pain, frequency, urgency, burning, depressed, sleeping and crying  2  ONSET: "When did the  s/s  start?"       Month ago     3  PAIN: "Is there any pain?" If so, ask: "How bad is it?" (Scale: 1-10; mild, moderate, severe)       6/10     4  CAUSE: "What do you think is causing the symptoms?"      Possible UTI    5  OTHER SYMPTOMS: "Do you have any other symptoms?" (e g , fever, flank pain, blood in urine, pain with urination)      Patient reports having middle and lower back pain  Denies hematuria at this time      Protocols used: [facility]

## 2020-03-29 NOTE — TELEPHONE ENCOUNTER
Regarding: Possible UTI  ----- Message from [facility], [facility]  sent at 3/29/2020  1:31 PM EDT -----  "I think I have a UTI"

## 2020-03-30 ENCOUNTER — TELEPHONE (OUTPATIENT)
Dept: OTHER | Facility: OTHER | Age: 75
End: 2020-03-30

## 2020-03-31 ENCOUNTER — TELEMEDICINE (OUTPATIENT)
Dept: INTERNAL MEDICINE CLINIC | Facility: CLINIC | Age: 75
End: 2020-03-31
Payer: COMMERCIAL

## 2020-03-31 DIAGNOSIS — N39.0 URINARY TRACT INFECTION WITHOUT HEMATURIA, SITE UNSPECIFIED: Primary | ICD-10-CM

## 2020-03-31 PROCEDURE — G2012 BRIEF CHECK IN BY MD/QHP: HCPCS | Performed by: INTERNAL MEDICINE

## 2020-03-31 RX ORDER — CIPROFLOXACIN 500 MG/1
500 TABLET, FILM COATED ORAL EVERY 12 HOURS SCHEDULED
Qty: 14 TABLET | Refills: 0 | Status: SHIPPED | OUTPATIENT
Start: 2020-03-31 | End: 2020-04-07

## 2020-03-31 NOTE — PROGRESS NOTES
Virtual Brief Visit    Problem List Items Addressed This Visit     None      Visit Diagnoses     Urinary tract infection without hematuria, site unspecified    -  Primary    Relevant Medications    ciprofloxacin (CIPRO) 500 mg tablet                Reason for visit is UTI    Encounter provider Sintia Baird MD    Provider located at [address] [place] [address]      Recent Visits  No visits were found meeting these conditions  Showing recent visits within past 7 days and meeting all other requirements     Future Appointments  No visits were found meeting these conditions  Showing future appointments within next 150 days and meeting all other requirements        After connecting through telephone, the patient was identified by name and date of birth  Senthil Mcdonough was informed that this is a telemedicine visit and that the visit is being conducted through telephone  My office door was closed  No one else was in the room  She acknowledged consent and understanding of privacy and security of the video platform  The patient has agreed to participate and understands they can discontinue the visit at any time  Patient is aware this is a billable service  Subjective  Snethil Mcdonough is a 77 y o  female who complains of fever, chills and burning in urine for a couple of days  Past Medical History:   Diagnosis Date    Anxiety     Disease of thyroid gland     Generalized anxiety disorder 4/23/2015    Hepatic disease     Infectious viral hepatitis     Liver disease     Osteoporosis     Parkinson's disease ([facility] [address] )     Rosacea     Shortness of breath        Past Surgical History:   Procedure Laterality Date    FOOT SURGERY Bilateral     HYSTERECTOMY      [place] ESOPHAGOGASTRODUODENOSCOPY TRANSORAL DIAGNOSTIC N/A 9/5/2018    Procedure: EGD AND COLONOSCOPY;  Surgeon: Bertin Garcia MD;  Location: [facility];   Service: Gastroenterology    URETHRA SURGERY      laparoscopic urethral suspension for stress incontinence       Current Outpatient Medications   Medication Sig Dispense Refill    aspirin (ECOTRIN LOW STRENGTH) 81 mg EC tablet Take 81 mg by mouth daily      carbidopa-levodopa (SINEMET)  mg per tablet Take 1 and half tablets 4 times a day 540 tablet 3    Cholecalciferol (VITAMIN D) 2000 units tablet 1 daily      ciprofloxacin (CIPRO) 500 mg tablet Take 1 tablet (500 mg total) by mouth every 12 (twelve) hours for 7 days 14 tablet 0    Cranberry 1000 MG CAPS Take 4,200 mg by mouth daily       cyanocobalamin (VITAMIN B-12) 1,000 mcg tablet Take 1,000 mcg by mouth daily      escitalopram (LEXAPRO) 20 mg tablet Take 1 tablet (20 mg total) by mouth daily 90 tablet 1    Homeopathic Products (PROSACEA) GEL Apply topically as needed       ibandronate (BONIVA) 150 MG tablet Take 1 tablet (150 mg total) by mouth every 30 (thirty) days (Patient not taking: Reported on 3/10/2020) 3 tablet 3    levothyroxine 50 mcg tablet TAKE 1 TABLET BY MOUTH ONCE DAILY 90 tablet 2    Multiple Vitamin (MULTIVITAMINS PO) daily      polyethylene glycol (MIRALAX) 17 g packet Take 17 g by mouth as needed       Probiotic Product (ALIGN) CHEW Chew daily       rasagiline (AZILECT) 1 MG TAKE 1 TABLET BY MOUTH ONCE DAILY 90 each 3    rotigotine (NEUPRO) 4 MG/24HR Place 1 patch on the skin daily 30 patch 5    Wheat Dextrin (BENEFIBER DRINK MIX PO) Take by mouth as needed        No current facility-administered medications for this visit  Allergies   Allergen Reactions    Statins Other (See Comments)     jaundice    Naproxen Hives    Simvastatin Other (See Comments)     Jaundice    Dust Mite Extract Other (See Comments)     Per pt states does not know the type of reaction    Pollen Extract Sneezing and Nasal Congestion       Review of Systems   Constitutional: Positive for chills and fever  Negative for diaphoresis and fatigue  HENT: Negative for congestion, ear discharge, ear pain, hearing loss, postnasal drip, rhinorrhea, sinus pressure, sinus pain, sneezing, sore throat and voice change  Eyes: Negative for pain, discharge, redness and visual disturbance  Respiratory: Negative for cough, chest tightness, shortness of breath and wheezing  Cardiovascular: Negative for chest pain, palpitations and leg swelling  Gastrointestinal: Negative for abdominal distention, abdominal pain, blood in stool, constipation, diarrhea, nausea and vomiting  Endocrine: Negative for cold intolerance, heat intolerance, polydipsia, polyphagia and polyuria  Genitourinary: Positive for dysuria and frequency  Negative for flank pain, hematuria and urgency  Musculoskeletal: Negative for arthralgias, back pain, gait problem, joint swelling, myalgias, neck pain and neck stiffness  Skin: Negative for rash  Neurological: Negative for dizziness, tremors, syncope, facial asymmetry, speech difficulty, weakness, light-headedness, numbness and headaches  Hematological: Does not bruise/bleed easily  Psychiatric/Behavioral: Negative for behavioral problems, confusion and sleep disturbance  The patient is not nervous/anxious  She was told to take Cipro and drink a lot of fluids    I spent 10 minutes with the patient during this visit

## 2020-05-11 ENCOUNTER — TELEMEDICINE (OUTPATIENT)
Dept: NEUROLOGY | Facility: CLINIC | Age: 75
End: 2020-05-11
Payer: COMMERCIAL

## 2020-05-11 ENCOUNTER — TELEPHONE (OUTPATIENT)
Dept: NEUROLOGY | Facility: CLINIC | Age: 75
End: 2020-05-11

## 2020-05-11 DIAGNOSIS — G20 PARKINSON'S DISEASE (HCC): Primary | ICD-10-CM

## 2020-05-11 PROCEDURE — 99442 PR PHYS/QHP TELEPHONE EVALUATION 11-20 MIN: CPT | Performed by: PSYCHIATRY & NEUROLOGY

## 2020-05-13 DIAGNOSIS — E07.9 THYROID DISORDER: ICD-10-CM

## 2020-05-13 RX ORDER — LEVOTHYROXINE SODIUM 50 UG/1
TABLET ORAL
Qty: 90 TABLET | Refills: 0 | Status: SHIPPED | OUTPATIENT
Start: 2020-05-13 | End: 2020-08-14

## 2020-05-21 ENCOUNTER — TELEPHONE (OUTPATIENT)
Dept: NEUROLOGY | Facility: CLINIC | Age: 75
End: 2020-05-21

## 2020-05-29 ENCOUNTER — TELEPHONE (OUTPATIENT)
Dept: NEUROLOGY | Facility: CLINIC | Age: 75
End: 2020-05-29

## 2020-06-01 ENCOUNTER — OFFICE VISIT (OUTPATIENT)
Dept: NEUROLOGY | Facility: CLINIC | Age: 75
End: 2020-06-01
Payer: COMMERCIAL

## 2020-06-01 DIAGNOSIS — G20 PARKINSON'S DISEASE (HCC): Primary | ICD-10-CM

## 2020-06-01 PROCEDURE — 99442 PR PHYS/QHP TELEPHONE EVALUATION 11-20 MIN: CPT | Performed by: PSYCHIATRY & NEUROLOGY

## 2020-06-10 ENCOUNTER — TELEPHONE (OUTPATIENT)
Dept: INTERNAL MEDICINE CLINIC | Facility: CLINIC | Age: 75
End: 2020-06-10

## 2020-06-10 ENCOUNTER — HOSPITAL ENCOUNTER (OUTPATIENT)
Dept: MAMMOGRAPHY | Facility: CLINIC | Age: 75
Discharge: HOME | End: 2020-06-10
Payer: COMMERCIAL

## 2020-06-10 DIAGNOSIS — M81.0 SENILE OSTEOPOROSIS: ICD-10-CM

## 2020-06-10 PROCEDURE — 77080 DXA BONE DENSITY AXIAL: CPT

## 2020-06-11 ENCOUNTER — APPOINTMENT (OUTPATIENT)
Dept: LAB | Facility: CLINIC | Age: 75
End: 2020-06-11
Payer: COMMERCIAL

## 2020-06-11 DIAGNOSIS — E55.9 VITAMIN D DEFICIENCY: ICD-10-CM

## 2020-06-11 DIAGNOSIS — E78.2 MIXED HYPERLIPIDEMIA: ICD-10-CM

## 2020-06-11 LAB
25(OH)D3 SERPL-MCNC: 33.3 NG/ML (ref 30–100)
ALBUMIN SERPL BCP-MCNC: 3.8 G/DL (ref 3.5–5)
ALP SERPL-CCNC: 83 U/L (ref 46–116)
ALT SERPL W P-5'-P-CCNC: 9 U/L (ref 12–78)
ANION GAP SERPL CALCULATED.3IONS-SCNC: 4 MMOL/L (ref 4–13)
AST SERPL W P-5'-P-CCNC: 16 U/L (ref 5–45)
BASOPHILS # BLD AUTO: 0.05 THOUSANDS/ΜL (ref 0–0.1)
BASOPHILS NFR BLD AUTO: 1 % (ref 0–1)
BILIRUB SERPL-MCNC: 0.53 MG/DL (ref 0.2–1)
BUN SERPL-MCNC: 18 MG/DL (ref 5–25)
CALCIUM SERPL-MCNC: 9.1 MG/DL (ref 8.3–10.1)
CHLORIDE SERPL-SCNC: 106 MMOL/L (ref 100–108)
CHOLEST SERPL-MCNC: 221 MG/DL (ref 50–200)
CO2 SERPL-SCNC: 28 MMOL/L (ref 21–32)
CREAT SERPL-MCNC: 0.85 MG/DL (ref 0.6–1.3)
EOSINOPHIL # BLD AUTO: 0.47 THOUSAND/ΜL (ref 0–0.61)
EOSINOPHIL NFR BLD AUTO: 6 % (ref 0–6)
ERYTHROCYTE [DISTWIDTH] IN BLOOD BY AUTOMATED COUNT: 13.6 % (ref 11.6–15.1)
GFR SERPL CREATININE-BSD FRML MDRD: 68 ML/MIN/1.73SQ M
GLUCOSE P FAST SERPL-MCNC: 93 MG/DL (ref 65–99)
HCT VFR BLD AUTO: 46.8 % (ref 34.8–46.1)
HDLC SERPL-MCNC: 63 MG/DL
HGB BLD-MCNC: 15 G/DL (ref 11.5–15.4)
IMM GRANULOCYTES # BLD AUTO: 0.05 THOUSAND/UL (ref 0–0.2)
IMM GRANULOCYTES NFR BLD AUTO: 1 % (ref 0–2)
LDLC SERPL CALC-MCNC: 135 MG/DL (ref 0–100)
LYMPHOCYTES # BLD AUTO: 2.05 THOUSANDS/ΜL (ref 0.6–4.47)
LYMPHOCYTES NFR BLD AUTO: 28 % (ref 14–44)
MCH RBC QN AUTO: 29.4 PG (ref 26.8–34.3)
MCHC RBC AUTO-ENTMCNC: 32.1 G/DL (ref 31.4–37.4)
MCV RBC AUTO: 92 FL (ref 82–98)
MONOCYTES # BLD AUTO: 0.6 THOUSAND/ΜL (ref 0.17–1.22)
MONOCYTES NFR BLD AUTO: 8 % (ref 4–12)
NEUTROPHILS # BLD AUTO: 4.17 THOUSANDS/ΜL (ref 1.85–7.62)
NEUTS SEG NFR BLD AUTO: 56 % (ref 43–75)
NONHDLC SERPL-MCNC: 158 MG/DL
NRBC BLD AUTO-RTO: 0 /100 WBCS
PLATELET # BLD AUTO: 237 THOUSANDS/UL (ref 149–390)
PMV BLD AUTO: 10.9 FL (ref 8.9–12.7)
POTASSIUM SERPL-SCNC: 4 MMOL/L (ref 3.5–5.3)
PROT SERPL-MCNC: 7 G/DL (ref 6.4–8.2)
RBC # BLD AUTO: 5.11 MILLION/UL (ref 3.81–5.12)
SODIUM SERPL-SCNC: 138 MMOL/L (ref 136–145)
TRIGL SERPL-MCNC: 114 MG/DL
TSH SERPL DL<=0.05 MIU/L-ACNC: 3.3 UIU/ML (ref 0.36–3.74)
WBC # BLD AUTO: 7.39 THOUSAND/UL (ref 4.31–10.16)

## 2020-06-11 PROCEDURE — 84443 ASSAY THYROID STIM HORMONE: CPT

## 2020-06-11 PROCEDURE — 80053 COMPREHEN METABOLIC PANEL: CPT

## 2020-06-11 PROCEDURE — 80061 LIPID PANEL: CPT

## 2020-06-11 PROCEDURE — 36415 COLL VENOUS BLD VENIPUNCTURE: CPT

## 2020-06-11 PROCEDURE — 82306 VITAMIN D 25 HYDROXY: CPT

## 2020-06-11 PROCEDURE — 85025 COMPLETE CBC W/AUTO DIFF WBC: CPT

## 2020-06-23 ENCOUNTER — TELEPHONE (OUTPATIENT)
Dept: INTERNAL MEDICINE CLINIC | Facility: CLINIC | Age: 75
End: 2020-06-23

## 2020-06-24 ENCOUNTER — OFFICE VISIT (OUTPATIENT)
Dept: INTERNAL MEDICINE CLINIC | Facility: CLINIC | Age: 75
End: 2020-06-24
Payer: COMMERCIAL

## 2020-06-24 VITALS
HEIGHT: 63 IN | TEMPERATURE: 98.5 F | HEART RATE: 88 BPM | BODY MASS INDEX: 27.32 KG/M2 | SYSTOLIC BLOOD PRESSURE: 110 MMHG | DIASTOLIC BLOOD PRESSURE: 80 MMHG | OXYGEN SATURATION: 94 % | WEIGHT: 154.2 LBS

## 2020-06-24 DIAGNOSIS — E03.9 ACQUIRED HYPOTHYROIDISM: ICD-10-CM

## 2020-06-24 DIAGNOSIS — M85.89 OSTEOPENIA OF MULTIPLE SITES: ICD-10-CM

## 2020-06-24 DIAGNOSIS — E66.3 OVERWEIGHT: ICD-10-CM

## 2020-06-24 DIAGNOSIS — F33.41 RECURRENT MAJOR DEPRESSIVE DISORDER, IN PARTIAL REMISSION (HCC): ICD-10-CM

## 2020-06-24 DIAGNOSIS — Z12.11 SCREEN FOR COLON CANCER: ICD-10-CM

## 2020-06-24 DIAGNOSIS — G20 PARKINSON'S DISEASE (HCC): ICD-10-CM

## 2020-06-24 DIAGNOSIS — F41.9 ANXIETY: ICD-10-CM

## 2020-06-24 DIAGNOSIS — E78.2 MIXED HYPERLIPIDEMIA: Primary | ICD-10-CM

## 2020-06-24 DIAGNOSIS — B80 PINWORM INFECTION: ICD-10-CM

## 2020-06-24 PROCEDURE — 1036F TOBACCO NON-USER: CPT | Performed by: INTERNAL MEDICINE

## 2020-06-24 PROCEDURE — 3008F BODY MASS INDEX DOCD: CPT | Performed by: INTERNAL MEDICINE

## 2020-06-24 PROCEDURE — 99214 OFFICE O/P EST MOD 30 MIN: CPT | Performed by: INTERNAL MEDICINE

## 2020-06-24 PROCEDURE — 4040F PNEUMOC VAC/ADMIN/RCVD: CPT | Performed by: INTERNAL MEDICINE

## 2020-06-24 PROCEDURE — 1160F RVW MEDS BY RX/DR IN RCRD: CPT | Performed by: INTERNAL MEDICINE

## 2020-06-24 RX ORDER — ALBENDAZOLE 200 MG/1
400 TABLET, FILM COATED ORAL ONCE
Qty: 2 TABLET | Refills: 0 | Status: SHIPPED | OUTPATIENT
Start: 2020-06-24 | End: 2020-06-24

## 2020-07-15 DIAGNOSIS — G20 PARKINSON'S DISEASE (HCC): ICD-10-CM

## 2020-07-15 NOTE — TELEPHONE ENCOUNTER
Patient's significant other requesting neupro refills  She misplaced her current rx  Unsure if insurance will cover this, he stated they'll pay OOP if needed (if pharm cannot override)       [facility]

## 2020-08-12 ENCOUNTER — TELEPHONE (OUTPATIENT)
Dept: NEUROLOGY | Facility: CLINIC | Age: 75
End: 2020-08-12

## 2020-08-14 ENCOUNTER — TELEPHONE (OUTPATIENT)
Dept: INTERNAL MEDICINE CLINIC | Facility: CLINIC | Age: 75
End: 2020-08-14

## 2020-08-14 ENCOUNTER — OFFICE VISIT (OUTPATIENT)
Dept: INTERNAL MEDICINE CLINIC | Facility: CLINIC | Age: 75
End: 2020-08-14
Payer: COMMERCIAL

## 2020-08-14 VITALS
WEIGHT: 154.6 LBS | TEMPERATURE: 98.4 F | BODY MASS INDEX: 27.39 KG/M2 | HEIGHT: 63 IN | DIASTOLIC BLOOD PRESSURE: 70 MMHG | HEART RATE: 76 BPM | SYSTOLIC BLOOD PRESSURE: 116 MMHG | OXYGEN SATURATION: 98 %

## 2020-08-14 DIAGNOSIS — G20 PARKINSON'S DISEASE (HCC): Primary | ICD-10-CM

## 2020-08-14 DIAGNOSIS — R26.2 AMBULATORY DYSFUNCTION: ICD-10-CM

## 2020-08-14 PROCEDURE — 1160F RVW MEDS BY RX/DR IN RCRD: CPT | Performed by: INTERNAL MEDICINE

## 2020-08-14 PROCEDURE — 99213 OFFICE O/P EST LOW 20 MIN: CPT | Performed by: INTERNAL MEDICINE

## 2020-08-14 PROCEDURE — 3008F BODY MASS INDEX DOCD: CPT | Performed by: INTERNAL MEDICINE

## 2020-08-14 PROCEDURE — 4040F PNEUMOC VAC/ADMIN/RCVD: CPT | Performed by: INTERNAL MEDICINE

## 2020-08-14 PROCEDURE — 1036F TOBACCO NON-USER: CPT | Performed by: INTERNAL MEDICINE

## 2020-08-14 RX ORDER — LEVOTHYROXINE SODIUM 0.05 MG/1
50 TABLET ORAL DAILY
COMMUNITY
End: 2020-10-09

## 2020-08-14 NOTE — PROGRESS NOTES
INTERNAL MEDICINE FOLLOW-UP OFFICE VISIT  [facility] of BEHAVIORAL MEDICINE AT [facility]    NAME: Alma Hendrickson  AGE: 77 y o  SEX: female  : 1945   MRN: [id number]    DATE: 2020  TIME: 3:58 PM    Assessment and Plan     Diagnoses and all orders for this visit:    Parkinson's disease ([facility] [address] )   patient has developed hallucinations and has had 2 episodes and is very uncomfortable with it  She feels that it is related to the Azilect which she is taking for the Parkinson's disease  She was not able to get in to the neurology office and wanted to know what to do  I told her to wean herself off the Azilect for now till she has the neurology appointment  Ambulatory dysfunction  -     Wheelchair  She has a lot of difficulty ambulating and wanted to use a wheelchair which was ordered  Other orders  -     levothyroxine 50 mcg tablet; Take 50 mcg by mouth daily        - Counseling Documentation: patient was counseled regarding: instructions for management, risk factor reductions, prognosis, patient and family education, risks and benefits of treatment options and importance of compliance with treatment  - Medication Side Effects: Adverse side effects of medications were reviewed with the patient/guardian today  Return to office in:   As needed    Chief Complaint     Chief Complaint   Patient presents with    Parkinson's Disease     patient is having side effects of her medication for her condition     Medication Refill     patient stated she needs new refills and her pharmacy is giving her a tough time        History of Present Illness     HPI    Patient complains of hallucinations where she hears voices and is convinced that there is someone else in the room  She feels very uncomfortable when it happens  She is also having increasing difficulty ambulating and thinks that she needs a wheelchair        The following portions of the patient's history were reviewed and updated as appropriate: allergies, current medications, past family history, past medical history, past social history, past surgical history and problem list     Review of Systems     Review of Systems   Constitutional: Positive for activity change and fatigue  Negative for chills, diaphoresis and fever  HENT: Negative for congestion, ear discharge, ear pain, hearing loss, postnasal drip, rhinorrhea, sinus pressure, sinus pain, sneezing, sore throat and voice change  Eyes: Negative for pain, discharge, redness and visual disturbance  Respiratory: Negative for cough, chest tightness, shortness of breath and wheezing  Cardiovascular: Negative for chest pain, palpitations and leg swelling  Gastrointestinal: Negative for abdominal distention, abdominal pain, blood in stool, constipation, diarrhea, nausea and vomiting  Endocrine: Negative for cold intolerance, heat intolerance, polydipsia, polyphagia and polyuria  Genitourinary: Negative for dysuria, flank pain, frequency, hematuria and urgency  Musculoskeletal: Positive for gait problem  Negative for arthralgias, back pain, joint swelling, myalgias, neck pain and neck stiffness  Skin: Negative for rash  Neurological: Negative for dizziness, tremors, syncope, facial asymmetry, speech difficulty, weakness, light-headedness, numbness and headaches  Hematological: Does not bruise/bleed easily  Psychiatric/Behavioral: Positive for confusion and hallucinations  Negative for sleep disturbance  The patient is not nervous/anxious          Active Problem List     Patient Active Problem List   Diagnosis    Parkinson's disease ([facility] [address] )    Scoliosis    Low back pain    Acquired hypothyroidism    Patient refuses to disclose advance directive information    Elevated antinuclear antibody (RAS) level    Family hx-breast malignancy    Mixed hyperlipidemia    Rosacea    Trochanteric bursitis    Urinary incontinence    Vitamin D deficiency    Irritable bowel syndrome with both constipation and diarrhea    REM behavioral disorder    Anxiety    Recurrent major depressive disorder, in partial remission (HCC)    Overweight    Osteopenia of multiple sites       Objective     /70 (BP Location: Left arm, Patient Position: Sitting, Cuff Size: Standard)   Pulse 76   Temp 98 4 °F (36 9 °C) (Temporal) Comment: NO N SAIDS  Ht 5' 3" (1 6 m)   Wt 70 1 kg (154 lb 9 6 oz)   SpO2 98%   BMI 27 39 kg/m²     Physical Exam  Constitutional:       General: She is not in acute distress  Appearance: She is well-developed  She is not diaphoretic  HENT:      Head: Normocephalic and atraumatic  Right Ear: External ear normal       Left Ear: External ear normal       Nose: Nose normal    Eyes:      General: No scleral icterus  Right eye: No discharge  Left eye: No discharge  Conjunctiva/sclera: Conjunctivae normal    Neck:      Musculoskeletal: Normal range of motion and neck supple  Thyroid: No thyromegaly  Vascular: No JVD  Trachea: No tracheal deviation  Cardiovascular:      Rate and Rhythm: Normal rate and regular rhythm  Heart sounds: Normal heart sounds  No murmur  No friction rub  No gallop  Pulmonary:      Effort: Pulmonary effort is normal  No respiratory distress  Breath sounds: Normal breath sounds  No wheezing or rales  Chest:      Chest wall: No tenderness  Abdominal:      General: Bowel sounds are normal  There is no distension  Palpations: Abdomen is soft  Tenderness: There is no abdominal tenderness  There is no guarding or rebound  Musculoskeletal: Normal range of motion  General: No tenderness  Lymphadenopathy:      Cervical: No cervical adenopathy  Skin:     General: Skin is warm and dry  Findings: No erythema or rash  Neurological:      Mental Status: She is alert and oriented to person, place, and time  Cranial Nerves: No cranial nerve deficit  Motor: No abnormal muscle tone  Coordination: Coordination normal       Comments: Has tremors and ambulatory dysfunction   Psychiatric:         Judgment: Judgment normal              Current Medications       Current Outpatient Medications:     aspirin (ECOTRIN LOW STRENGTH) 81 mg EC tablet, Take 81 mg by mouth daily, Disp: , Rfl:     carbidopa-levodopa (SINEMET)  mg per tablet, Take 1 and half tablets 4 times a day, Disp: 540 tablet, Rfl: 3    Cholecalciferol (VITAMIN D) 2000 units tablet, 1 daily, Disp: , Rfl:     Cranberry 1000 MG CAPS, Take 4,200 mg by mouth as needed , Disp: , Rfl:     cyanocobalamin (VITAMIN B-12) 1,000 mcg tablet, Take 1,000 mcg by mouth daily, Disp: , Rfl:     escitalopram (LEXAPRO) 20 mg tablet, Take 1 tablet (20 mg total) by mouth daily, Disp: 90 tablet, Rfl: 1    Homeopathic Products (PROSACEA) GEL, Apply topically as needed , Disp: , Rfl:     levothyroxine 50 mcg tablet, Take 50 mcg by mouth daily, Disp: , Rfl:     Multiple Vitamin (MULTIVITAMINS PO), daily, Disp: , Rfl:     Probiotic Product (ALIGN) CHEW, Chew daily , Disp: , Rfl:     rotigotine (Neupro) 4 MG/24HR, Place 1 patch on the skin daily, Disp: 30 patch, Rfl: 5    polyethylene glycol (MIRALAX) 17 g packet, Take 17 g by mouth as needed , Disp: , Rfl:     Wheat Dextrin (BENEFIBER DRINK MIX PO), Take by mouth as needed , Disp: , Rfl:     Health Maintenance     Health Maintenance   Topic Date Due    Influenza Vaccine  07/01/2020    Fall Risk  02/12/2021    Medicare Annual Wellness Visit (AWV)  02/12/2021    BMI: Followup Plan  06/24/2021    BMI: Adult  08/14/2021    DXA SCAN  06/10/2022    DTaP,Tdap,and Td Vaccines (2 - Td) 10/25/2022    Colorectal Cancer Screening  09/05/2028    Hepatitis C Screening  Completed    Pneumococcal Vaccine: 65+ Years  Completed    HIB Vaccine  Aged Out    Hepatitis B Vaccine  Aged Out    IPV Vaccine  Aged Out    Hepatitis A Vaccine  Aged Out    Meningococcal ACWY Vaccine  Aged Out    HPV Vaccine  Aged Out     Immunization History   Administered Date(s) Administered    H1N1 Inj 12/06/2009    H1N1, All Formulations 12/14/2009, 12/14/2009    Hep A, adult 06/06/1996, 05/22/1997    INFLUENZA 12/04/2006, 11/07/2007, 10/03/2008, 02/12/2010, 09/15/2010, 09/21/2011, 10/25/2012, 11/01/2013, 10/15/2014, 10/05/2015, 10/02/2016, 11/13/2017, 09/18/2018    Influenza Quadrivalent 3 years and older 10/05/2015    Influenza Quadrivalent 6 mos and older IM 09/18/2018    Influenza TIV (IM) 12/04/2006, 11/07/2007, 10/03/2008, 02/12/2010, 09/15/2010, 09/21/2011, 10/25/2012, 11/01/2013, 10/15/2014    Pneumococcal Conjugate 13-Valent 05/03/2016    Pneumococcal Polysaccharide PPV23 03/17/2011    Td (adult), adsorbed 06/10/2002    Tdap 10/25/2012    Zoster 09/12/2012    influenza, trivalent, adjuvanted 10/14/2019         Rehan Crocker MD  [id number] [facility] of BEHAVIORAL MEDICINE AT [facility]

## 2020-08-25 ENCOUNTER — EVALUATION (OUTPATIENT)
Dept: PHYSICAL THERAPY | Facility: CLINIC | Age: 75
End: 2020-08-25
Payer: COMMERCIAL

## 2020-08-25 DIAGNOSIS — G20 PARKINSON'S DISEASE (HCC): Primary | ICD-10-CM

## 2020-08-25 PROCEDURE — 97163 PT EVAL HIGH COMPLEX 45 MIN: CPT

## 2020-08-25 NOTE — PROGRESS NOTES
PT Evaluation     Today's date: 2020  Patient name: Mariah Madrid  : 1945  MRN: [id number]  Referring provider: Jani Almendarez MD  Dx: No diagnosis found  Assessment  Assessment details: 77 y o  female presents to PT with dx of Parkinson's Disease  Pt states it is her first time having PT for her dx  During the PT examination the pt presents with undershooting during saccades (oculomotor dysfunction) and reduced static balance times  She displays festinating gait primarily when attempting to turn in place or make turns in gait, resulting in anteropusion  Torres Score indicates increased risk for falls  Pt demonstrates slow transfers per  5XSTS socre with10 sec age norm  Pt owns a rollator and Nordic walking poles  She will bring her Rollator to the next visit, and pt was advised to use her Rollator to walk in and out of PT  Physical therapy is advised, twice weekly, and pt is in agreement  Impairments: abnormal gait, abnormal movement, activity intolerance, impaired balance, lacks appropriate home exercise program and safety issue  Other impairment: poor/unsafe movement initiation  Functional limitations: Unsafe turns in gait and in place  Symptom irritability: moderateUnderstanding of Dx/Px/POC: excellent   Prognosis: good    Goals  STG 2-4 weeks  1  Pt will display independence with gait on level surfaces with Rollator and Nordic poles  2   Pt will be independent with early HEP  3  Pt will need cueing ravin 50% of the time for high-march technique, or swaying technique to override poor motion initiation, and to improve safety with turns in place  LTG 4-12 weeks  1  Pt will display independent, or safe with close supervision, ambulation on unlevel surfaces, stairs, curbs, perturb surfaces with AD as needed  2  Pt will be independent with final HEP  3  Improve 5XSTS score to approach 10 sec age norm      4  Improved Torres score to 48 or higher, to indicate mild impairments  5  Improve REC (narrow based eyes closed) score by 10 sec to improve static balance in ow lighting   6  Pt will need cueing ravin 10-25% of the time for high-march technique, or swaying technique to overide poor motion initiation, and to improve safety with turns in place  7  Pt will progress to Parkinson's appropriate maintenance program: LSVT, PWR or [employer]          Cut off score   All date taken from [facility] Neuro Section or Rehab Measures    VOGT test: 46/56                                              5 x STS Test:  MDC: 6 points                                                  MDC: 2 3 seconds   age norms                                                                 Age Norms   61-76 year old = M: 54, F: 55                        62-78 year old: 11 4 seconds   66-77 year old = M 47,  F: 50                       71-76 year old: 12 6 seconds     80-80 year old = M46,   F: 53                       80-80 year old: 14 8 seconds      TUG test:                                                                     10 Meter Walk Test:  MDC: 4 14 seconds       MDC:  59 ft/sec  Cut off score for Falls                                                  Age Norms  > 13 5 seconds community dwelling adults                 20-29; M: 4 56 ft/sec F: 4 62 ft/sec  > 32 2 Frail Elderly                                                      30-39: M 4 76 ft/sec  F: 4 68 ft/sec          40-49: M: 4 79 ft/sec  F: 4 62 ft/sec  6 Minute Walk Test      50-59: M: 4 76 ft/sec  F: 4 56 ft/sec  MDC: 190 feet       60-69: M: 4 56 ft/sec  F: 4 26 ft/sec  Age Norms       70-+    M: 4 36 ft/sec  F: 4 16 ft/sec  60-69:    M: 1876 F: 0384  65-32:    M: 1729 F: 7162  49-98 +: M: 6050 F; 1286           Plan  Planned therapy interventions: activity modification, balance, gait training, therapeutic exercise, therapeutic activities, transfer training, patient education, neuromuscular re-education and manual therapy  Frequency: 2x week  Duration in weeks: 12  Plan of Care beginning date: 2020  Plan of Care expiration date: 2020  Treatment plan discussed with: patient and family        Subjective Evaluation    History of Present Illness  Mechanism of injury: 77year old female reports to PT with dx of Parkinson's Ds  She states she wants to get her balance evaluated  She has had PT for her back previously  PT helped her back for a while  States when she exercises faithfully, Parkinson's sx are much reduced  Feels her symptoms are worse when she is stressed  Pt lives in [facility], is wondering if there is a program closer for her, but wiling to attend PT here for the time being  Quality of life: poor    Pain  No pain reported    Social Support  Steps to enter house: yes (railing present)  Interior stairs assessed: stairs to basement , she does not need them, railings present  Lives in: multiple-level home  Lives with: significant other    Life stress: family    Treatments  No previous or current treatments  Patient Goals  Patient goal: improve gait pattern, lessen the effects of stress on her mobiliy        Objective     Functional Assessment        Comments  Coordination:   Finger to nose: mild UE tremor bilat  Heel to shin:  Intact     BETTY:  UE:intact      LE:intact     Strength:  Hip flexion:  R=5/5 L=5/5  Quads:  R = 5 /5  L = 5/5  Ankles: R=5/5  L=5/5    Oculomotor:  SP:intact   Saccades:undershooting noted  Head thrust: NP    Static Balance:  REO:30 sec   REC:10 sec   SREO:NP  SREC:NP   SLS:R= 8 sec, L= 1 sec     Dynamic Balance and Transfers:   Gait speed:11 25 sec over 10M =0 88m/sec or  2 9ft/sec NO AD  Torres/56  TUG:NT   5XSTS:15 63 sec   Gait: freezing, stuttering steps noted when moving toward a chair  Pt has Nordic Poles, uses them occasionally  Positionals  Not tested  Pt denies dizziness         Flowsheet Rows      Most Recent Value   PT/OT G-Codes Current Score  25   Projected Score  43             Precautions:   Past Medical History:   Diagnosis Date    Anxiety     Disease of thyroid gland     Generalized anxiety disorder 4/23/2015    Hepatic disease     Infectious viral hepatitis     Liver disease     Osteoporosis     Overweight (BMI 25 0-29 9) 2/12/2020    Parkinson's disease ([facility] [address] )     Rosacea     Senile osteoporosis 5/27/2003    Shortness of breath          Manuals                                                                 Neuro Re-Ed                                                                                                        Ther Ex                                                                                                                     Ther Activity                                       Gait Training                                       Modalities

## 2020-09-01 ENCOUNTER — OFFICE VISIT (OUTPATIENT)
Dept: PHYSICAL THERAPY | Facility: CLINIC | Age: 75
End: 2020-09-01
Payer: COMMERCIAL

## 2020-09-01 ENCOUNTER — TELEPHONE (OUTPATIENT)
Dept: NEUROLOGY | Facility: CLINIC | Age: 75
End: 2020-09-01

## 2020-09-01 DIAGNOSIS — G20 PARKINSON'S DISEASE (HCC): Primary | ICD-10-CM

## 2020-09-01 PROCEDURE — 97112 NEUROMUSCULAR REEDUCATION: CPT | Performed by: PHYSICAL THERAPIST

## 2020-09-01 NOTE — TELEPHONE ENCOUNTER
Called and left a detailed message on pt's answering machine of all of the below and for a call back if any additional questions or concerns

## 2020-09-01 NOTE — PROGRESS NOTES
Assessment/Plan:    Parkinson's disease [facility])  25-year-old woman presents in follow-up for Parkinson's disease  She reports 2 episodes confusion neither of which I would consider to be hallucinations  It is very unlikely at her Lexapro or Azilect triggered these events  She has noticed that her gait is worse since discontinuing the Azilect however we agreed to hold off on restarting this medication given her concerns  Given the patient's baseline anxiety did recommend that she restart her Lexapro  Given the new episodes of confusion in her baseline freezing of gait I recommended starting a cholinesterase inhibitor, specifically rivastigmine  Follow-up in 4 months      Diagnoses and all orders for this visit:    Parkinson's disease ([facility] [address] )  -     rivastigmine (EXELON) 1 5 mg capsule; Take 1 capsule (1 5 mg total) by mouth 2 (two) times a day    Recurrent major depressive disorder, in partial remission (HCC)  -     rivastigmine (EXELON) 1 5 mg capsule; Take 1 capsule (1 5 mg total) by mouth 2 (two) times a day  -     escitalopram (LEXAPRO) 20 mg tablet; Take 1 tablet (20 mg total) by mouth daily        Subjective:     Patient ID: Israel Bar is a 77 y o  female who presents today     I had the pleasure of seeing your Jax Alas [facility]        To review, Linda Saleh is a 25-year-old right-handed woman with levodopa-responsive Parkinson's disease, symptom onset in 2011 with left foot tremor, now complicated by non-motor wearing-off, slight dyskinesias, FoG  In Fall of 2011 she noted some balance trouble and later developed micrographia  She was started on Azilect 1mg with little notable improvement  Neupro patch later added  Sinemet was added later and has been titrated up to 1 5 tabs 4 times per day      Prior medication trials:  - Azilect 1mg in the evening - stopped by PCP (gait worsened without it)     Current medications and timing:  - Sinemet 25/100; 1 5 tabs 4 times per day; @ 8am, noon, 3:30pm, +/-7 pm roughly  - Neupro Patch 4mg evening       Interval history  Two episodes of "hallucinations"  "Its like a bad dream"  PCP told her to wean off of the azilect and it sounds like her kids took her off of her Lexapro  Her history is a bit non-linear and confusing  The 1st of the events in question consisted of the patient waking around mid night and doing crafts  Her  came in and asked her what she was doing and she said I was having fun  The 2nd event happened late at night  Her and her  were staying in a new house they were considering purchasing and up late watching television  When the show ended she thought that the people from the TV show were actually in her house  It took her moment to realize that she was confusing the events of the TV show with reality  The patient found these events to be scary  The fear associated with PD in general is worrisome to her  Lots of anxiety  Started PT  The following portions of the patient's history were reviewed and updated as appropriate: allergies, current medications, past family history, past medical history, past social history, past surgical history and problem list     Objective:  /64 (BP Location: Left arm, Patient Position: Standing, Cuff Size: Adult)   Pulse 83   Temp (!) 96 4 °F (35 8 °C) (Temporal)   Ht 5' 3" (1 6 m)   Wt 70 3 kg (155 lb)   BMI 27 46 kg/m²     Physical Exam    Neurological Exam  GENERAL MEDICAL EXAMINATION:  General appearance: alert, in no apparent distress  Appropriately dressed and groomed  Conversing and interacting appropriately  Eyes: Sclera are non-injected  Ears, nose, Mouth, Throat: Mucous membranes are moist    Resp: Breathing comfortably on RA   Musculoskeletal: No evidence of deformities  No contractures  No Edema  Skin: No visible rashes  Warm and well perfused    Psych: normal and appropriate affect Mental Status:  Alert and oriented to person place and time  Able to relate history without difficulty  Attentive to conversation  Language is fluent and appropriate with normal prosody  There were no paraphasic errors  Speech was not dysarthric  Able to follow both midline and appendicular commands  General Neurology examination:     UPDRS motor:                              Time since last dose:       Speech  1     Facial Expression  +     Rigidity - Neck       Rigidity - Upper Extremity (Right)  0     Rigidity - Upper Extremity (Left)   0     Rigidity - Lower Extremity (Right)  0     Rigidity - Lower Extremity (Left)   0     Finger Taps (Right)   2     Finger Taps (Left)   1     Hand Movement (Right)  0     Hand Movement (Left)   0     Pronation/Supination (Right)  0     Pronation/Supination (Left)   0     Toe Tapping (Right) 1     Toe Tapping (Left) 2     Leg Agility (Right)  0     Leg Agility (Left)   0     Arising from Chair   1     Gait   1     Freezing of Gait 2     Postural Stability        Posture 2     Global spontaneity of movement 1     Postural Tremor (Right) 0     Postural Tremor (Left) 0     Kinetic Tremor (Right)  0     Kinetic Tremor (Left)  0     Rest tremor amplitude RUE 0     Rest tremor amplitude LUE 0     Rest tremor amplitude RLE 0     Reset tremor amplitude LLE 2     Lip/Jaw Tremor  0     Consistency of tremor 2     Motor Exam Total:          Dysmetria: none on FNF  Dyskinesia: mild, some in the legs, neck  Dystonia: none     Stance: narrow-based and stable   Stride: normal speed, stride length, step height, walks with no assistive device   Arm swing: symmetric, not reduced   Turn: stable, 3-5 steps       Review of Systems   Constitutional: Negative  Negative for appetite change and fever  HENT: Negative  Negative for hearing loss, tinnitus, trouble swallowing and voice change  Eyes: Negative  Negative for photophobia and pain  Respiratory: Negative    Negative for shortness of breath  Cardiovascular: Negative  Negative for palpitations  Gastrointestinal: Negative  Negative for nausea and vomiting  Endocrine: Negative  Negative for cold intolerance  Genitourinary: Negative  Negative for dysuria, frequency and urgency  Musculoskeletal: Positive for gait problem  Negative for myalgias and neck pain  Skin: Negative  Negative for rash  Allergic/Immunologic: Negative  Neurological: Negative for dizziness, tremors, seizures, syncope, facial asymmetry, speech difficulty, weakness, light-headedness, numbness and headaches  Hematological: Negative  Does not bruise/bleed easily  Psychiatric/Behavioral: Negative  Negative for confusion, hallucinations and sleep disturbance  The above ROS was reviewed and updated  Current Outpatient Medications on File Prior to Visit   Medication Sig Dispense Refill    aspirin (ECOTRIN LOW STRENGTH) 81 mg EC tablet Take 81 mg by mouth daily      carbidopa-levodopa (SINEMET)  mg per tablet Take 1 and half tablets 4 times a day 540 tablet 3    Cholecalciferol (VITAMIN D) 2000 units tablet 1 daily      Cranberry 1000 MG CAPS Take 4,200 mg by mouth as needed       cyanocobalamin (VITAMIN B-12) 1,000 mcg tablet Take 1,000 mcg by mouth daily      Homeopathic Products (PROSACEA) GEL Apply topically as needed       levothyroxine 50 mcg tablet Take 50 mcg by mouth daily      Multiple Vitamin (MULTIVITAMINS PO) daily      rotigotine (Neupro) 4 MG/24HR Place 1 patch on the skin daily 30 patch 5    [DISCONTINUED] escitalopram (LEXAPRO) 20 mg tablet Take 1 tablet (20 mg total) by mouth daily 90 tablet 1    polyethylene glycol (MIRALAX) 17 g packet Take 17 g by mouth as needed       Probiotic Product (ALIGN) CHEW Chew daily       Wheat Dextrin (BENEFIBER DRINK MIX PO) Take by mouth as needed        No current facility-administered medications on file prior to visit          Kevin Casillas MD  Medical Director [facility]  Movement and Memory Specialist

## 2020-09-01 NOTE — TELEPHONE ENCOUNTER
Pt's SO Gómez connell Angela called and states that pt was taking azilect but pcp weaned pt off of azilect d/t episode of hallucination  Last dose was last week  States that hallucination is bothersome  Pt feels that it is r/t azilect  Most recent episode of hallucination was yesterday  Pt was watching tv, then she start asking where did everyone go? Sees fish in the bathtub  Gómez connell Angela then gave the phone to his wife  Pt states that she was also weaned off lexapro prescribed by pcp  Last dose last week  Pt c/o buzzing in her head  Slight dizziness and lightheadedness  She is unsure if this is because she discontinued azilect and lexapro? Pt is asking if she should restart taking both meds? Advised pt that Dr Erica Carrel only manage her parkinson's meds  Pt verbalized understanding  She will call her pcp to discuss restarting lexarpro  Pt is asking what she should do today  Should she restart azilect or wait tmrw to discuss this w/ Dr Erica Carrel?   Pls advise    Confirmed appt tmrw at 0815 [phone] ok to leave detailed message

## 2020-09-01 NOTE — PROGRESS NOTES
Daily Note     Today's date: 2020  Patient name: Mary Thurman  : 1945  MRN: [id number]  Referring provider: Norah Almeida MD  Dx:   Encounter Diagnosis     ICD-10-CM    1  Parkinson's disease ([facility] [address] )  G20                   Subjective: Pt reports shuffling gait and balance is an issue  Notes doing ok so far today  Objective: See treatment diary below  1  Floor to ceiling- 10 reps  2  Side to side- 10 reps bilaterally   3  forward step- 10 reps bilaterally   4  side step- 10 reps bilaterally   5  backward step- 10 reps bilaterally with chair support due to loss of balance  6  rocking- 10 reps bilaterally  7  twist- 10 reps bilaterally     Sit to stand- 10 reps     Amb: stepping like kicking seat on rollator 150 feet to increase stride length  Assessment: challenged with program this date which focused on flexibility and balance with 1 UE support  Increased guidiance with VCs and Tcs for number 2, 5, 6 and 7  Reviewed exercises with spouse to conduct all exercises 2 times a day  Pt will continue to benefit from skilled PT to enhance mobility to highest level possible  Plan: Continue per plan of care        Precautions:   Past Medical History:   Diagnosis Date    Anxiety     Disease of thyroid gland     Generalized anxiety disorder 2015    Hepatic disease     Infectious viral hepatitis     Liver disease     Osteoporosis     Overweight (BMI 25 0-29 9) 2020    Parkinson's disease ([facility] [address] )     Rosacea     Senile osteoporosis 2003    Shortness of breath          Manuals                                                                 Neuro Re-Ed                                                                                                        Ther Ex                                                                                                                     Ther Activity                                       Gait Training Modalities

## 2020-09-01 NOTE — TELEPHONE ENCOUNTER
She has been on the azilect for years  I would strongly prefer that she discuss any changes to her Parkinson's medications with me prior to making changes, especially significant ones like this  She is probably not feeling well because she came of of the lexapro which is almost certainly not causing her hallucinations  I would recommend restarting the lexapro today  We can discuss the hallucinations and azilect tomorrow

## 2020-09-02 ENCOUNTER — OFFICE VISIT (OUTPATIENT)
Dept: NEUROLOGY | Facility: CLINIC | Age: 75
End: 2020-09-02
Payer: COMMERCIAL

## 2020-09-02 VITALS
SYSTOLIC BLOOD PRESSURE: 106 MMHG | BODY MASS INDEX: 27.46 KG/M2 | TEMPERATURE: 96.4 F | DIASTOLIC BLOOD PRESSURE: 64 MMHG | HEART RATE: 83 BPM | WEIGHT: 155 LBS | HEIGHT: 63 IN

## 2020-09-02 DIAGNOSIS — F33.41 RECURRENT MAJOR DEPRESSIVE DISORDER, IN PARTIAL REMISSION (HCC): ICD-10-CM

## 2020-09-02 DIAGNOSIS — G20 PARKINSON'S DISEASE (HCC): Primary | ICD-10-CM

## 2020-09-02 PROCEDURE — 99214 OFFICE O/P EST MOD 30 MIN: CPT | Performed by: PSYCHIATRY & NEUROLOGY

## 2020-09-02 RX ORDER — RIVASTIGMINE TARTRATE 1.5 MG/1
1.5 CAPSULE ORAL 2 TIMES DAILY
Qty: 60 CAPSULE | Refills: 3 | Status: SHIPPED | OUTPATIENT
Start: 2020-09-02 | End: 2020-11-09 | Stop reason: SDUPTHER

## 2020-09-02 RX ORDER — ESCITALOPRAM OXALATE 20 MG/1
20 TABLET ORAL DAILY
Qty: 90 TABLET | Refills: 3 | Status: SHIPPED | OUTPATIENT
Start: 2020-09-02 | End: 2021-09-03 | Stop reason: SDUPTHER

## 2020-09-02 NOTE — PATIENT INSTRUCTIONS
I would recommend restarting your antidepressant (lexapro/escitalopram)   Ok to stay off of rasagaline (azilect)   Wait a could of weeks for your body to get used to being back on the antidepressant and then we can start the new medication

## 2020-09-02 NOTE — ASSESSMENT & PLAN NOTE
59-year-old woman presents in follow-up for Parkinson's disease  She reports 2 episodes confusion neither of which I would consider to be hallucinations  It is very unlikely at her Lexapro or Azilect triggered these events  She has noticed that her gait is worse since discontinuing the Azilect however we agreed to hold off on restarting this medication given her concerns  Given the patient's baseline anxiety did recommend that she restart her Lexapro  Given the new episodes of confusion in her baseline freezing of gait I recommended starting a cholinesterase inhibitor, specifically rivastigmine      Follow-up in 4 months

## 2020-09-04 ENCOUNTER — OFFICE VISIT (OUTPATIENT)
Dept: PHYSICAL THERAPY | Facility: CLINIC | Age: 75
End: 2020-09-04
Payer: COMMERCIAL

## 2020-09-04 DIAGNOSIS — G20 PARKINSON'S DISEASE (HCC): Primary | ICD-10-CM

## 2020-09-04 PROCEDURE — 97112 NEUROMUSCULAR REEDUCATION: CPT

## 2020-09-04 NOTE — PROGRESS NOTES
Daily Note     Today's date: 2020  Patient name: Josiah Ponce  : 1945  MRN: [id number]  Referring provider: Veronica Nye MD  Dx:   Encounter Diagnosis     ICD-10-CM    1  Parkinson's disease ([facility] [address] )  G20                   Subjective: Patient reports feeling "very tired" after last session and was unable to perform HEP yesterday evening  Objective: See treatment diary below    BIG Exercises: Session , Intensity 8/10  1  Floor to ceiling- 10 reps  2  Side to side- 8 reps bilaterally   3  forward step- 8 reps bilaterally   4  side step- 8 reps bilaterally   5  backward step- 8 reps bilaterally with chair support due to loss of balance  6  rocking- 8 reps bilaterally  7  twist- 8 reps bilaterally     Sit to stand- 10 reps     Amb: stepping like kicking seat on rollator 150 feet to increase stride length  - Held this session            Assessment: Patient able to tolerate treatment session well today with reduced overall repetitions due to increased fatigue last session  Increased focus on stability with increased amplitude with fair carryover especially as she fatigued  She demonstrated increased speed of movement by last few repetitions requiring increased PT verbal cues to slow down to focus on neuromuscular control throughout  Held BIG walking this session due increased fatigue last session  Pt will continue to benefit from skilled PT to enhance mobility to highest level possible  Plan: Continue per plan of care        Precautions:   Past Medical History:   Diagnosis Date    Anxiety     Disease of thyroid gland     Generalized anxiety disorder 2015    Hepatic disease     Infectious viral hepatitis     Liver disease     Osteoporosis     Overweight (BMI 25 0-29 9) 2020    Parkinson's disease ([facility] [address] [facility]     Rosacea     Senile osteoporosis 2003    Shortness of breath          Manuals                                                                 Neuro Re-Ed Ther Ex                                                                                                                     Ther Activity                                       Gait Training                                       Modalities

## 2020-09-09 ENCOUNTER — APPOINTMENT (OUTPATIENT)
Dept: PHYSICAL THERAPY | Facility: CLINIC | Age: 75
End: 2020-09-09
Payer: COMMERCIAL

## 2020-09-11 ENCOUNTER — APPOINTMENT (OUTPATIENT)
Dept: PHYSICAL THERAPY | Facility: CLINIC | Age: 75
End: 2020-09-11
Payer: COMMERCIAL

## 2020-09-15 ENCOUNTER — TELEPHONE (OUTPATIENT)
Dept: NEUROLOGY | Facility: CLINIC | Age: 75
End: 2020-09-15

## 2020-09-15 ENCOUNTER — APPOINTMENT (OUTPATIENT)
Dept: PHYSICAL THERAPY | Facility: CLINIC | Age: 75
End: 2020-09-15
Payer: COMMERCIAL

## 2020-09-15 NOTE — TELEPHONE ENCOUNTER
pt called regarding her medications  she asked how she is to take her meds  per office note-  I would recommend restarting your antidepressant (lexapro/escitalopram)   Carl Chew to stay off of rasagaline (azilect)   Wait a could of weeks for your body to get used to being back on the antidepressant and then we can start the new medication  reviewed above with her      she states that she restarted lexapro yesterday and it bothers her  stomach, makes her feel nauseous, no vomiting  no other side effects  she took this with food  She is asking if you want her to continue lexapro and if so for how long before she starts rivastigmine    please advise  [phone] to leave detailed message

## 2020-09-15 NOTE — TELEPHONE ENCOUNTER
Nausea usually improves with a little time   I would continue on this for 2 weeks prior to starting the rivastigmine

## 2020-09-17 ENCOUNTER — EVALUATION (OUTPATIENT)
Dept: PHYSICAL THERAPY | Facility: CLINIC | Age: 75
End: 2020-09-17
Payer: COMMERCIAL

## 2020-09-17 ENCOUNTER — APPOINTMENT (OUTPATIENT)
Dept: PHYSICAL THERAPY | Facility: CLINIC | Age: 75
End: 2020-09-17
Payer: COMMERCIAL

## 2020-09-17 DIAGNOSIS — G20 PARKINSON'S DISEASE (HCC): ICD-10-CM

## 2020-09-17 PROCEDURE — 97112 NEUROMUSCULAR REEDUCATION: CPT

## 2020-09-17 NOTE — PROGRESS NOTES
PT Re-Evaluation     Today's date: 2020  Patient name: Mayur Win  : 1945  MRN: [id number]  Referring provider: Sandra Tejeda MD  Dx:   Encounter Diagnosis     ICD-10-CM    1  Parkinson's disease ([facility] [address] [address] )  [address] Ambulatory referral to Physical Therapy                  Assessment  Assessment details: Mayur Win is a 77 y o female who presents to physical therapy with diagnosis of Parkinson's disease  Her chief complaint is difficulty walking with poor movement initiation  Since her initial evaluation at [facility], her static balance times have increased as she was able to maintain her feet together with her eyes closed for the full 30 seconds with mild to moderate sway  Her Torres score remains the same as when initially tested, which classifies her as an increased falls risk  A timed up and go was measured today and her time demonstrates impaired balance, similarly indicating she is at an increased falls risk  Her LE strength and power has improved as well as her speed of transfers as her 5x STS time improved from her initial testing, approaching age group norm, but not there yet  She displays festinating gait primarily when attempting to turn in place or make turns in gait, but she did not exhibit any freezing episodes today  She was educated on the importance of consistently performing her HEP provided to her by the PT at the previous facility and encouraged for daily performance  She verbalized understanding  The patient will benefit from skilled physical therapy to provide exercises, neuromuscular reeducation, manual techniques, and modalities as deemed necessary in order to help her achieve her goals and maximize her safety and independence with functional mobility  Impairments: abnormal gait, abnormal movement, activity intolerance, impaired balance, lacks appropriate home exercise program and safety issue  Other impairment: poor/unsafe movement initiation     Functional limitations: Unsafe turns in gait and in place  Symptom irritability: moderateUnderstanding of Dx/Px/POC: excellent   Prognosis: good    Goals  STG 2-4 weeks  1  Pt will display independence with gait on level surfaces with Rollator and Nordic poles  (ONGOING)  2  Pt will be independent with early HEP (MET)  3  Pt will need cueing ravin 50% of the time for high-march technique, or swaying technique to override poor motion initiation, and to improve safety with turns in place  (ONGOING)       LTG 4-12 weeks  1  Pt will display independent, or safe with close supervision, ambulation on unlevel surfaces, stairs, curbs, perturb surfaces with AD as needed  (ONGOING)  2  Pt will be independent with final HEP  (ONGOING)  3  Improve 5XSTS score to approach 10 sec age norm  (ONGOING)    4  Improved Vogt score to 48 or higher, to indicate mild impairments  (ONGOING)    5  Improve REC (narrow based eyes closed) score by 10 sec to improve static balance in low lighting (MET)  6  Pt will need cueing ravin 10-25% of the time for high-march technique, or swaying technique to overide poor motion initiation, and to improve safety with turns in place  (ONGOING)   7  Pt will progress to Parkinson's appropriate maintenance program: LSVT, PWR or Rock Steady/neuroboxing   (ONGOING)  8   Improve TUG time to no more than 13 5 seconds for decreased falls risk      Cut off score   All date taken from [facility] Neuro Section or Rehab Measures    VOGT test: 46/56                                              5 x STS Test:  MDC: 6 points                                                  MDC: 2 3 seconds   age norms                                                                 Age Norms   61-76 year old = M: 54, F: 54                        61-76 year old: 11 4 seconds   66-77 year old = M 47,  F: 50                       71-76 year old: 12 6 seconds     80-80 year old = M46,   F: 53                       80-80 year old: 14 8 seconds      TUG test: 10 Meter Walk Test:  MDC: 4 14 seconds       MDC:  59 ft/sec  Cut off score for Falls                                                  Age Norms  > 13 5 seconds community dwelling adults                 20-29; M: 4 56 ft/sec F: 4 62 ft/sec  > 32 2 Frail Elderly                                                      30-39: M 4 76 ft/sec  F: 4 68 ft/sec          40-49: M: 4 79 ft/sec  F: 4 62 ft/sec  6 Minute Walk Test      50-59: M: 4 76 ft/sec  F: 4 56 ft/sec  MDC: 190 feet       60-69: M: 4 56 ft/sec  F: 4 26 ft/sec  Age Norms       70-+    M: 4 36 ft/sec  F: 4 16 ft/sec  60-69:    M: 1876 F: 3124  76-44:    M: 1729 F: 1545  80-89 +: M: 80 F; 1286           Plan  Patient would benefit from: skilled physical therapy  Planned therapy interventions: activity modification, balance, gait training, therapeutic exercise, therapeutic activities, transfer training, patient education, neuromuscular re-education and manual therapy  Frequency: 2x week  Duration in weeks: 8  Plan of Care beginning date: 8/25/2020  Plan of Care expiration date: 11/24/2020  Treatment plan discussed with: patient and family        Subjective Evaluation    History of Present Illness  Mechanism of injury: Melvena Bamberger states that she was diagnosed with Parkinson's a number of years ago  She notes that she initially responded really well to medications  For most of her mobility, she will not use an AD  She does have a rollator or Nordic poles  She has a few close falls, but no actual falls as her life partner helped catch her  She admits to not performing her HEP given to her by the PT at [facility] consistently as she has a lot going on at home  She does note that the exercises help her  She takes carbidopa-levodopa 4x/day and says that some days the medication lasts until her next dose and other days she has off times       Quality of life: poor    Pain  No pain reported    Social Support  Steps to enter house: yes (railing present)  Interior stairs assessed: stairs to basement , she does not need them, railings present  Lives in: multiple-level home  Lives with: significant other    Life stress: family    Treatments  No previous or current treatments  Patient Goals  Patient goal: improve gait pattern, lessen the effects of stress on her mobility        Objective     Functional Assessment        Comments  Static Balance:  REO:30 sec   REC:30 sec   SREO:NP  SREC:NP   SLS: R= 15 sec, L= 8 sec     Dynamic Balance and Transfers:   Gait speed:11 25 sec over 10M =0 88m/sec or  2 9ft/sec NO AD  ()    -10 98 sec over 10 M =  91 m/sec NO AD ()    Torres/56 ()  -46/56 ()    TUG:NT ()  -14 1 sec; no AD;  increased falls risk ()    5XSTS:15 63 sec ()  -12 4 sec; used large amplitude UE movements to help gain momentum ()    Gait: festinating steps when walking through doorways, approaching a chair, and with turns                   Precautions:   Past Medical History:   Diagnosis Date    Anxiety     Disease of thyroid gland     Generalized anxiety disorder 2015    Hepatic disease     Infectious viral hepatitis     Liver disease     Osteoporosis     Overweight (BMI 25 0-29 9) 2020    Parkinson's disease ([facility] )     Rosacea     Senile osteoporosis 2003    Shortness of breath          Manuals                                                                 Neuro Re-Ed                                                                                                        Ther Ex                                                                                                                     Ther Activity                                       Gait Training                                       Modalities

## 2020-09-22 ENCOUNTER — OFFICE VISIT (OUTPATIENT)
Dept: INTERNAL MEDICINE CLINIC | Facility: CLINIC | Age: 75
End: 2020-09-22
Payer: COMMERCIAL

## 2020-09-22 VITALS
HEART RATE: 79 BPM | OXYGEN SATURATION: 96 % | BODY MASS INDEX: 27.21 KG/M2 | WEIGHT: 153.6 LBS | HEIGHT: 63 IN | SYSTOLIC BLOOD PRESSURE: 116 MMHG | TEMPERATURE: 97.9 F | DIASTOLIC BLOOD PRESSURE: 70 MMHG

## 2020-09-22 DIAGNOSIS — Z23 ENCOUNTER FOR IMMUNIZATION: ICD-10-CM

## 2020-09-22 DIAGNOSIS — G20 PARKINSON'S DISEASE (HCC): ICD-10-CM

## 2020-09-22 DIAGNOSIS — E03.9 ACQUIRED HYPOTHYROIDISM: Primary | ICD-10-CM

## 2020-09-22 DIAGNOSIS — F33.41 RECURRENT MAJOR DEPRESSIVE DISORDER, IN PARTIAL REMISSION (HCC): ICD-10-CM

## 2020-09-22 PROBLEM — Z78.9 PATIENT REFUSES TO DISCLOSE ADVANCE DIRECTIVE INFORMATION: Status: RESOLVED | Noted: 2018-05-23 | Resolved: 2020-09-22

## 2020-09-22 PROCEDURE — G0008 ADMIN INFLUENZA VIRUS VAC: HCPCS | Performed by: INTERNAL MEDICINE

## 2020-09-22 PROCEDURE — 90662 IIV NO PRSV INCREASED AG IM: CPT | Performed by: INTERNAL MEDICINE

## 2020-09-22 PROCEDURE — 99214 OFFICE O/P EST MOD 30 MIN: CPT | Performed by: INTERNAL MEDICINE

## 2020-09-22 NOTE — PROGRESS NOTES
INTERNAL MEDICINE OFFICE VISIT  [facility] AT [address]  Tel: [phone]      NAME: Janell Gomez  AGE: 77 y o  SEX: female  : 1945   MRN: [id number]    DATE: 2020  TIME: 4:17 PM      Assessment and Plan:  1  Acquired hypothyroidism   continue levothyroxine at the same dose    2  Parkinson's disease ([facility] [address] )   patient is presently taking the Sinemet and will be starting with the Exelon soon as per the advice of her neurologist     3  Recurrent major depressive disorder, in partial remission ([facility] )    Continue Lexapro    4  Encounter for immunization    - influenza vaccine, high-dose, PF 0 7 mL (FLUZONE HIGH-DOSE)      - Counseling Documentation: patient was counseled regarding: diagnostic results, instructions for management, risk factor reductions, prognosis, patient and family education, risks and benefits of treatment options and importance of compliance with treatment  - Medication Side Effects: Adverse side effects of medications were reviewed with the patient/guardian today  Return for follow up visit in  4 months or earlier, if needed  Chief Complaint:  Chief Complaint   Patient presents with    Flu Vaccine         History of Present Illness:    the patient's son is here with her as she has started forgetting and is confused about her medications and their dosages  The son will be helping her with the medications and will be putting the pills in the pill box is for her to take it during the week  She stopped taking the Lexapro on her own few weeks ago but was now told by myself and her neurologist to restart it and she agreed  He agrees that she has to take the Sinemet regularly  She will soon start taking the Exelon as well  She says she has not stop taking the levothyroxine ever    I had a very lengthy discussion with the patient and her son and went over all the medications and how to take them       Active Problem List:  Patient Active Problem List   Diagnosis    Parkinson's disease ([facility] [address] )    Scoliosis    Low back pain    Acquired hypothyroidism    Elevated antinuclear antibody (RAS) level    Family hx-breast malignancy    Mixed hyperlipidemia    Rosacea    Trochanteric bursitis    Urinary incontinence    Vitamin D deficiency    Irritable bowel syndrome with both constipation and diarrhea    REM behavioral disorder    Anxiety    Recurrent major depressive disorder, in partial remission ([facility] )    Overweight    Osteopenia of multiple sites         Past Medical History:  Past Medical History:   Diagnosis Date    Anxiety     Disease of thyroid gland     Generalized anxiety disorder 4/23/2015    Hepatic disease     Infectious viral hepatitis     Liver disease     Osteoporosis     Overweight (BMI 25 0-29 9) 2/12/2020    Parkinson's disease ([facility] [address] )     Patient refuses to disclose advance directive information 5/23/2018    Overview:  Yes, Patient instructed to provide copy of advance directive for provider to review and to be scanned into Electronic Medical Record  Overview:  Yes, Patient instructed to provide copy of advance directive for provider to review and to be scanned into Electronic Medical Record    Rosacea     Senile osteoporosis 5/27/2003    Shortness of breath          Past Surgical History:  Past Surgical History:   Procedure Laterality Date    FOOT SURGERY Bilateral     HYSTERECTOMY      IL ESOPHAGOGASTRODUODENOSCOPY TRANSORAL DIAGNOSTIC N/A 9/5/2018    Procedure: EGD AND COLONOSCOPY;  Surgeon: Abelino Boothe MD;  Location: [facility];   Service: Gastroenterology    URETHRA SURGERY      laparoscopic urethral suspension for stress incontinence         Family History:  Family History   Problem Relation Age of Onset    Lung cancer Mother     Dementia Mother     Cancer Mother     Lung cancer Father     Cancer Father     Esophageal cancer Brother     Cancer Brother     Cavazos's esophagus Brother     Esophageal cancer Family     Heart disease Family     Stroke Family         syndrome         Social History:  Social History     Socioeconomic History    Marital status: Significant Other     Spouse name: None    Number of children: None    Years of education: Master's Degree    Highest education level: None   Occupational History     Comment: Retired   Social Needs    Financial resource strain: None    Food insecurity     Worry: None     Inability: None    Transportation needs     Medical: None     Non-medical: None   Tobacco Use    Smoking status: Never Smoker    Smokeless tobacco: Never Used   Substance and Sexual Activity    Alcohol use: Not Currently     Alcohol/week: 1 0 standard drinks     Types: 1 Glasses of wine per week     Frequency: Monthly or less     Drinks per session: 1 or 2     Binge frequency: Never     Comment: Alcohol use per [employer]    Drug use: No    Sexual activity: Never   Lifestyle    Physical activity     Days per week: 1 day     Minutes per session: 10 min    Stress: Only a little   Relationships    Social connections     Talks on phone: None     Gets together: None     Attends Jainism service: None     Active member of club or organization: None     Attends meetings of clubs or organizations: None     Relationship status: None    Intimate partner violence     Fear of current or ex partner: None     Emotionally abused: None     Physically abused: None     Forced sexual activity: None   Other Topics Concern    None   Social History Narrative    Denied:  History of caffeine use         Allergies:   Allergies   Allergen Reactions    Statins Other (See Comments)     jaundice    Naproxen Hives    Simvastatin Other (See Comments)     Jaundice    Dust Mite Extract Other (See Comments)     Per pt states does not know the type of reaction    Pollen Extract Sneezing and Nasal Congestion         Medications:    Current Outpatient Medications:     carbidopa-levodopa (SINEMET)  mg per tablet, Take 1 and half tablets 4 times a day, Disp: 540 tablet, Rfl: 3    Cholecalciferol (VITAMIN D) 2000 units tablet, 1 daily, Disp: , Rfl:     Cranberry 1000 MG CAPS, Take 4,200 mg by mouth as needed , Disp: , Rfl:     cyanocobalamin (VITAMIN B-12) 1,000 mcg tablet, Take 1,000 mcg by mouth daily, Disp: , Rfl:     escitalopram (LEXAPRO) 20 mg tablet, Take 1 tablet (20 mg total) by mouth daily, Disp: 90 tablet, Rfl: 3    Homeopathic Products (PROSACEA) GEL, Apply topically as needed , Disp: , Rfl:     levothyroxine 50 mcg tablet, Take 50 mcg by mouth daily, Disp: , Rfl:     Multiple Vitamin (MULTIVITAMINS PO), daily, Disp: , Rfl:     rivastigmine (EXELON) 1 5 mg capsule, Take 1 capsule (1 5 mg total) by mouth 2 (two) times a day, Disp: 60 capsule, Rfl: 3    rotigotine (Neupro) 4 MG/24HR, Place 1 patch on the skin daily, Disp: 30 patch, Rfl: 5    aspirin (ECOTRIN LOW STRENGTH) 81 mg EC tablet, Take 81 mg by mouth daily, Disp: , Rfl:     polyethylene glycol (MIRALAX) 17 g packet, Take 17 g by mouth as needed , Disp: , Rfl:     Probiotic Product (ALIGN) CHEW, Chew daily , Disp: , Rfl:     Wheat Dextrin (BENEFIBER DRINK MIX PO), Take by mouth as needed , Disp: , Rfl:       The following portions of the patient's history were reviewed and updated as appropriate: past medical history, past surgical history, family history, social history, allergies, current medications and active problem list       Review of Systems:  Constitutional: Denies fever, chills, weight gain, weight loss, fatigue  Eyes: Denies eye redness, eye discharge, double vision, change in visual acuity  ENT: Denies hearing loss, tinnitus, sneezing, nasal congestion, nasal discharge, sore throat   Respiratory: Denies cough, expectoration, hemoptysis, shortness of breath, wheezing  Cardiovascular: Denies chest pain, palpitations, lower extremity swelling, orthopnea, PND  Gastrointestinal: Denies abdominal pain, heartburn, nausea, vomiting, hematemesis, diarrhea, bloody stools  Genito-Urinary: Denies dysuria, frequency, difficulty in micturition, nocturia, incontinence  Musculoskeletal: Denies back pain, joint pain, muscle pain  Neurologic:   Has memory loss and confusion, denies lightheadedness, syncope, headache, focal weakness, sensory changes, seizures  Endocrine: Denies polyuria, polydipsia, temperature intolerance  Allergy and Immunology: Denies hives, insect bite sensitivity  Hematological and Lymphatic: Denies bleeding problems, swollen glands   Psychological: Denies depression, suicidal ideation, anxiety, panic, mood swings  Dermatological: Denies pruritus, rash, skin lesion changes      Vitals:  Vitals:    09/22/20 1531   BP: 116/70   Pulse: 79   Temp: 97 9 °F (36 6 °C)   SpO2: 96%       Body mass index is 27 21 kg/m²  Weight (last 2 days)     Date/Time   Weight    09/22/20 1531   69 7 (153 6)                Physical Examination:  General: Patient is not in acute distress  Awake, alert, responding to commands  No weight gain or loss  Head: Normocephalic  Atraumatic  Eyes: Conjunctiva and lids with no swelling, erythema or discharge  Both pupils normal sized, round and reactive to light  Sclera nonicteric  ENT: External examination of nose and ear normal  Otoscopic examination shows translucent tympanic membranes with patent canals without erythema  Oropharynx moist with no erythema, edema, exudate or lesions  Neck: Supple  JVP not raised  Trachea midline  No masses  No thyromegaly  Lungs: No signs of increased work of breathing or respiratory distress  Bilateral bronchovascular breath sounds with no crackles or rhonchi  Chest wall: No tenderness  Cardiovascular: Normal PMI  No thrills  Regular rate and rhythm  S1 and S2 normal  No murmur, rub or gallop  Gastrointestinal: Abdomen soft, nontender  No guarding or rigidity  Liver and spleen not palpable   Bowel sounds present  Neurologic: Cranial nerves II-XII intact  Cortical functions normal  Parkinson's disease  Recently started with short-term memory loss  Musculoskeletal: Gait normal  No joint tenderness  Integumentary: Skin normal with no rash or lesions  Lymphatic: No palpable lymph nodes in neck, axilla or groin  Extremities: No clubbing, cyanosis, edema or varicosities  Psychological:  Mood normal      Laboratory Results:  CBC with diff:   Lab Results   Component Value Date    WBC 7 39 06/11/2020    RBC 5 11 06/11/2020    HGB 15 0 06/11/2020    HCT 46 8 (H) 06/11/2020    MCV 92 06/11/2020    MCH 29 4 06/11/2020    RDW 13 6 06/11/2020     06/11/2020       CMP:  Lab Results   Component Value Date    CREATININE 0 85 06/11/2020    BUN 18 06/11/2020    K 4 0 06/11/2020     06/11/2020    CO2 28 06/11/2020    ALKPHOS 83 06/11/2020    ALT 9 (L) 06/11/2020    AST 16 06/11/2020       Lab Results   Component Value Date    MG 2 4 07/25/2018       No results found for: TROPONINI, CKMB, CKTOTAL    Lipid Profile:   No results found for: CHOL  Lab Results   Component Value Date    HDL 63 06/11/2020    HDL 60 12/31/2019     Lab Results   Component Value Date    LDLCALC 135 (H) 06/11/2020    LDLCALC 144 (H) 12/31/2019     Lab Results   Component Value Date    TRIG 114 06/11/2020    TRIG 159 (H) 12/31/2019       Imaging Results:  DXA bone density spine hip and pelvis  Narrative: CENTRAL  DXA SCAN    CLINICAL HISTORY:   77year old post-menopausal  female risk factors include family history of low impact parenteral hip fracture  Depression  Osteoporosis screening  TECHNIQUE: Bone densitometry was performed using a Hologic Horizon C bone densitometer  Regions of interest appear properly placed  There are no obvious fractures or other confounding variables which could limit the study  Degenerative changes of the   lumbar spine and hip  This will falsely elevate the bone mineral densities in these regions  Moderate scoliosis, concave right  COMPARISON:  None  RESULTS:   LUMBAR SPINE:  L1-L4:  BMD 1 020 gm/cm2  T-score -0 2  Z-score 2 1    LEFT TOTAL HIP:  BMD 0 773 gm/cm2  T-score -1 4  Z-score 0 4    LEFT FEMORAL NECK:  BMD 0 593 gm/cm2  T-score -2 3  Z-score -0 3    LEFT FOREARM :  BMD 0 582 gm/sq-cm,  T-score is  -1 8  Z-score is  0 8    Impression: 1  Based on the [facility] classification, the T-score of -2 3 in the left hip is consistent with LOW BONE MINERAL DENSITY (OSTEOPENIA)  2   Any secondary causes of low bone mineral density should be excluded prior to treatment, if clinically indicated  3   A daily intake of at least 1200 mg calcium and 800 to 1000 IU of Vitamin D, as well as weight bearing and muscle strengthening exercise, fall prevention and avoidance of tobacco and excessive alcohol intake as basic preventive measures are suggested  4   Repeat DXA  in 18 - 24 months, on the same machine, as clinically indicated  The 10 year risk of hip fracture is 14%, with the 10 year risk of major osteoporotic fracture being 25%, as calculated by the [facility] fracture risk assessment tool (FRAX)  The current NOF guidelines recommend treating patients with FRAX 10 year risk score   of >3% for hip fracture and >20% for major osteoporotic fracture       WHO CLASSIFICATION:  Normal (a T-score of -1 0 or higher)  Low bone mineral density (a T-score of less than -1 0 but higher than -2 5)  Osteoporosis (a T-score of -2 5 or less)  Severe osteoporosis (a T-score of -2 5 or less with a fragility fracture)    Workstation performed: [id number]       Health Maintenance:  Health Maintenance   Topic Date Due    Influenza Vaccine  07/01/2020    PT PLAN OF CARE  10/17/2020    Medicare Annual Wellness Visit (AWV)  02/12/2021    BMI: Followup Plan  06/24/2021    Fall Risk  08/25/2021    BMI: Adult  09/22/2021    DXA SCAN  06/10/2022    DTaP,Tdap,and Td Vaccines (2 - Td) 10/25/2022    Colorectal Cancer Screening 09/05/2028    Hepatitis C Screening  Completed    Pneumococcal Vaccine: 65+ Years  Completed    HIB Vaccine  Aged Out    Hepatitis B Vaccine  Aged Out    IPV Vaccine  Aged Out    Hepatitis A Vaccine  Aged Out    Meningococcal ACWY Vaccine  Aged Out    HPV Vaccine  Aged Out     Immunization History   Administered Date(s) Administered    H1N1 Inj 12/06/2009    H1N1, All Formulations 12/14/2009, 12/14/2009    Hep A, adult 06/06/1996, 05/22/1997    INFLUENZA 12/04/2006, 11/07/2007, 10/03/2008, 02/12/2010, 09/15/2010, 09/21/2011, 10/25/2012, 11/01/2013, 10/15/2014, 10/05/2015, 10/02/2016, 11/13/2017, 09/18/2018    Influenza Quadrivalent 3 years and older 10/05/2015    Influenza Quadrivalent 6 mos and older IM 09/18/2018    Influenza TIV (IM) 12/04/2006, 11/07/2007, 10/03/2008, 02/12/2010, 09/15/2010, 09/21/2011, 10/25/2012, 11/01/2013, 10/15/2014    Influenza, high dose seasonal 0 7 mL 09/22/2020    Pneumococcal Conjugate 13-Valent 05/03/2016    Pneumococcal Polysaccharide PPV23 03/17/2011    Td (adult), adsorbed 06/10/2002    Tdap 10/25/2012    Zoster 09/12/2012    influenza, trivalent, adjuvanted 10/14/2019         Vinny Bustillos MD  9/22/2020,4:17 PM

## 2020-09-23 ENCOUNTER — OFFICE VISIT (OUTPATIENT)
Dept: PHYSICAL THERAPY | Facility: CLINIC | Age: 75
End: 2020-09-23
Payer: COMMERCIAL

## 2020-09-23 DIAGNOSIS — G20 PARKINSON'S DISEASE (HCC): Primary | ICD-10-CM

## 2020-09-23 PROCEDURE — 97112 NEUROMUSCULAR REEDUCATION: CPT

## 2020-09-23 PROCEDURE — 97116 GAIT TRAINING THERAPY: CPT

## 2020-09-23 NOTE — PROGRESS NOTES
Daily Note     Today's date: 2020  Patient name: Graham Henderson  : 1945  MRN: [id number]  Referring provider: Pamela Johnson MD  Dx:   Encounter Diagnosis     ICD-10-CM    1  Parkinson's disease ([facility] )  G20                   Subjective: Fritz Mejias states that she did a lot yesterday and is fatigued today  She reports that movement today is more difficult due to the fatigue  She has been doing her exercises every day, but breaks them up throughout the day  Objective: See treatment diary below      Assessment: Clarita tolerated treatment well with seated rest breaks when needed  Her PRE for the session was 8/10  She demonstrated fair form with intermittent need for demonstration and verbal cues for proper body position  She did require intermittent UE support on a chair as she was worried about her balance, but did not have any instances of LOB  With large amplitude walking, she had consistent arm swing and good step length until she approached a turn or doorway  She demonstrated festinating gait with turns and when cued for big steps, she was able to correct  She had one instance of freezing while walking through a doorway that she was able independent break  She will benefit from continued skilled PT to promote overall mobility and safety with functional mobility  Plan: Continue per plan of care        Precautions:   Past Medical History:   Diagnosis Date    Anxiety     Disease of thyroid gland     Generalized anxiety disorder 2015    Hepatic disease     Infectious viral hepatitis     Liver disease     Osteoporosis     Overweight (BMI 25 0-29 9) 2020    Parkinson's disease ([facility] )     Rosacea     Senile osteoporosis 2003    Shortness of breath          Manuals                                                                 Neuro Re-Ed             Large amplitude: floor to ceiling 10" x8            Large amplitude: side to side 10"x8            Large amplitude: fwd stepping 10" x8 bilat w/ UUE            Large amplitude: side stepping 10" x8 bilat w/ UUE            Large amplitude: bwd stepping 10" x8 bilat w/ intermittent UE            Large amplitude: rocking x10 bilat UUE            Large amplitude: figure 8 twists x10            Sit to stands x10             Ther Ex                                                                                                                     Ther Activity                                       Gait Training             Large amplitude walking 100 ft x4                         Modalities

## 2020-10-01 ENCOUNTER — OFFICE VISIT (OUTPATIENT)
Dept: PHYSICAL THERAPY | Facility: CLINIC | Age: 75
End: 2020-10-01
Payer: COMMERCIAL

## 2020-10-01 DIAGNOSIS — G20 PARKINSON'S DISEASE (HCC): Primary | ICD-10-CM

## 2020-10-01 PROCEDURE — 97116 GAIT TRAINING THERAPY: CPT

## 2020-10-01 PROCEDURE — 97112 NEUROMUSCULAR REEDUCATION: CPT

## 2020-10-05 ENCOUNTER — OFFICE VISIT (OUTPATIENT)
Dept: PHYSICAL THERAPY | Facility: CLINIC | Age: 75
End: 2020-10-05
Payer: COMMERCIAL

## 2020-10-05 DIAGNOSIS — G20 PARKINSON'S DISEASE (HCC): Primary | ICD-10-CM

## 2020-10-05 PROCEDURE — 97112 NEUROMUSCULAR REEDUCATION: CPT

## 2020-10-08 ENCOUNTER — OFFICE VISIT (OUTPATIENT)
Dept: INTERNAL MEDICINE CLINIC | Facility: CLINIC | Age: 75
End: 2020-10-08
Payer: COMMERCIAL

## 2020-10-08 ENCOUNTER — APPOINTMENT (OUTPATIENT)
Dept: RADIOLOGY | Facility: CLINIC | Age: 75
End: 2020-10-08
Payer: COMMERCIAL

## 2020-10-08 ENCOUNTER — OFFICE VISIT (OUTPATIENT)
Dept: PHYSICAL THERAPY | Facility: CLINIC | Age: 75
End: 2020-10-08
Payer: COMMERCIAL

## 2020-10-08 VITALS
WEIGHT: 154 LBS | HEART RATE: 94 BPM | TEMPERATURE: 97.6 F | BODY MASS INDEX: 27.29 KG/M2 | SYSTOLIC BLOOD PRESSURE: 124 MMHG | DIASTOLIC BLOOD PRESSURE: 76 MMHG | OXYGEN SATURATION: 96 % | HEIGHT: 63 IN

## 2020-10-08 DIAGNOSIS — G20 PARKINSON'S DISEASE (HCC): Primary | ICD-10-CM
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
## 2020-10-08 DIAGNOSIS — S20.212A RIB CONTUSION, LEFT, INITIAL ENCOUNTER: ICD-10-CM

## 2020-10-08 PROCEDURE — 97112 NEUROMUSCULAR REEDUCATION: CPT

## 2020-10-08 PROCEDURE — 99213 OFFICE O/P EST LOW 20 MIN: CPT | Performed by: NURSE PRACTITIONER

## 2020-10-08 PROCEDURE — 97110 THERAPEUTIC EXERCISES: CPT

## 2020-10-08 PROCEDURE — 71111 X-RAY EXAM RIBS/CHEST4/> VWS: CPT

## 2020-10-08 PROCEDURE — 1160F RVW MEDS BY RX/DR IN RCRD: CPT | Performed by: NURSE PRACTITIONER

## 2020-10-08 PROCEDURE — 1036F TOBACCO NON-USER: CPT | Performed by: NURSE PRACTITIONER

## 2020-10-09 DIAGNOSIS — E03.9 ACQUIRED HYPOTHYROIDISM: Primary | ICD-10-CM

## 2020-10-09 RX ORDER — LEVOTHYROXINE SODIUM 50 UG/1
TABLET ORAL
Qty: 90 TABLET | Refills: 0 | Status: SHIPPED | OUTPATIENT
Start: 2020-10-09 | End: 2021-02-04

## 2020-10-12 ENCOUNTER — APPOINTMENT (OUTPATIENT)
Dept: PHYSICAL THERAPY | Facility: CLINIC | Age: 75
End: 2020-10-12
Payer: COMMERCIAL

## 2020-10-12 ENCOUNTER — TELEPHONE (OUTPATIENT)
Dept: INTERNAL MEDICINE CLINIC | Facility: CLINIC | Age: 75
End: 2020-10-12

## 2020-10-15 ENCOUNTER — APPOINTMENT (OUTPATIENT)
Dept: PHYSICAL THERAPY | Facility: CLINIC | Age: 75
End: 2020-10-15
Payer: COMMERCIAL

## 2020-10-16 ENCOUNTER — APPOINTMENT (OUTPATIENT)
Dept: PHYSICAL THERAPY | Facility: CLINIC | Age: 75
End: 2020-10-16
Payer: COMMERCIAL

## 2020-10-19 ENCOUNTER — APPOINTMENT (OUTPATIENT)
Dept: PHYSICAL THERAPY | Facility: CLINIC | Age: 75
End: 2020-10-19
Payer: COMMERCIAL

## 2020-10-21 ENCOUNTER — TELEPHONE (OUTPATIENT)
Dept: NEUROLOGY | Facility: CLINIC | Age: 75
End: 2020-10-21

## 2020-10-22 ENCOUNTER — APPOINTMENT (OUTPATIENT)
Dept: PHYSICAL THERAPY | Facility: CLINIC | Age: 75
End: 2020-10-22
Payer: COMMERCIAL

## 2020-10-22 DIAGNOSIS — G20 PARKINSON'S DISEASE (HCC): Primary | ICD-10-CM

## 2020-10-22 RX ORDER — RASAGILINE 1 MG/1
0.5 TABLET ORAL DAILY
Qty: 90 TABLET | Refills: 3 | Status: SHIPPED | OUTPATIENT
Start: 2020-10-22 | End: 2021-01-25

## 2020-10-29 ENCOUNTER — EVALUATION (OUTPATIENT)
Dept: PHYSICAL THERAPY | Facility: CLINIC | Age: 75
End: 2020-10-29
Payer: COMMERCIAL

## 2020-10-29 DIAGNOSIS — G20 PARKINSON'S DISEASE (HCC): Primary | ICD-10-CM

## 2020-10-29 PROCEDURE — 97110 THERAPEUTIC EXERCISES: CPT

## 2020-10-29 PROCEDURE — 97116 GAIT TRAINING THERAPY: CPT

## 2020-11-02 ENCOUNTER — OFFICE VISIT (OUTPATIENT)
Dept: PHYSICAL THERAPY | Facility: CLINIC | Age: 75
End: 2020-11-02
Payer: COMMERCIAL

## 2020-11-02 DIAGNOSIS — G20 PARKINSON'S DISEASE (HCC): Primary | ICD-10-CM

## 2020-11-02 PROCEDURE — 97112 NEUROMUSCULAR REEDUCATION: CPT

## 2020-11-02 PROCEDURE — 97110 THERAPEUTIC EXERCISES: CPT

## 2020-11-05 ENCOUNTER — OFFICE VISIT (OUTPATIENT)
Dept: PHYSICAL THERAPY | Facility: CLINIC | Age: 75
End: 2020-11-05
Payer: COMMERCIAL

## 2020-11-05 DIAGNOSIS — G20 PARKINSON'S DISEASE (HCC): Primary | ICD-10-CM

## 2020-11-05 PROCEDURE — 97116 GAIT TRAINING THERAPY: CPT

## 2020-11-05 PROCEDURE — 97112 NEUROMUSCULAR REEDUCATION: CPT

## 2020-11-09 ENCOUNTER — APPOINTMENT (OUTPATIENT)
Dept: PHYSICAL THERAPY | Facility: CLINIC | Age: 75
End: 2020-11-09
Payer: COMMERCIAL

## 2020-11-09 ENCOUNTER — TELEPHONE (OUTPATIENT)
Dept: INTERNAL MEDICINE CLINIC | Facility: CLINIC | Age: 75
End: 2020-11-09

## 2020-11-09 ENCOUNTER — TELEPHONE (OUTPATIENT)
Dept: NEUROLOGY | Facility: CLINIC | Age: 75
End: 2020-11-09

## 2020-11-09 DIAGNOSIS — G20 PARKINSON'S DISEASE (HCC): ICD-10-CM

## 2020-11-09 DIAGNOSIS — F33.41 RECURRENT MAJOR DEPRESSIVE DISORDER, IN PARTIAL REMISSION (HCC): ICD-10-CM

## 2020-11-09 RX ORDER — RIVASTIGMINE TARTRATE 3 MG/1
3 CAPSULE ORAL 2 TIMES DAILY
Qty: 60 CAPSULE | Refills: 3 | Status: SHIPPED | OUTPATIENT
Start: 2020-11-09 | End: 2020-11-24

## 2020-11-11 NOTE — PROGRESS NOTES
[facility] [place]:    Pt attended eval and 2PT  treatment sessions at the [facility], and then transferred care to [facility], as it is closer to her home  DC from [facility] PT  Pt continues care as above

## 2020-11-12 ENCOUNTER — APPOINTMENT (OUTPATIENT)
Dept: PHYSICAL THERAPY | Facility: CLINIC | Age: 75
End: 2020-11-12
Payer: COMMERCIAL

## 2020-11-16 ENCOUNTER — APPOINTMENT (OUTPATIENT)
Dept: PHYSICAL THERAPY | Facility: CLINIC | Age: 75
End: 2020-11-16
Payer: COMMERCIAL

## 2020-11-19 ENCOUNTER — APPOINTMENT (OUTPATIENT)
Dept: PHYSICAL THERAPY | Facility: CLINIC | Age: 75
End: 2020-11-19
Payer: COMMERCIAL

## 2020-11-23 ENCOUNTER — APPOINTMENT (OUTPATIENT)
Dept: PHYSICAL THERAPY | Facility: CLINIC | Age: 75
End: 2020-11-23
Payer: COMMERCIAL

## 2020-11-24 DIAGNOSIS — G20 PARKINSON'S DISEASE (HCC): Primary | ICD-10-CM

## 2020-11-24 RX ORDER — RIVASTIGMINE 9.5 MG/24H
1 PATCH, EXTENDED RELEASE TRANSDERMAL DAILY
Qty: 30 PATCH | Refills: 3 | Status: SHIPPED | OUTPATIENT
Start: 2020-11-24 | End: 2021-05-05 | Stop reason: SDUPTHER

## 2020-11-26 ENCOUNTER — APPOINTMENT (OUTPATIENT)
Dept: PHYSICAL THERAPY | Facility: CLINIC | Age: 75
End: 2020-11-26
Payer: COMMERCIAL

## 2020-12-08 ENCOUNTER — TELEPHONE (OUTPATIENT)
Dept: NEUROLOGY | Facility: CLINIC | Age: 75
End: 2020-12-08

## 2020-12-08 ENCOUNTER — OFFICE VISIT (OUTPATIENT)
Dept: NEUROLOGY | Facility: CLINIC | Age: 75
End: 2020-12-08
Payer: COMMERCIAL

## 2020-12-08 VITALS — HEIGHT: 63 IN | BODY MASS INDEX: 27.11 KG/M2 | WEIGHT: 153 LBS

## 2020-12-08 DIAGNOSIS — G20 PARKINSON'S DISEASE (HCC): Primary | ICD-10-CM

## 2020-12-08 PROCEDURE — 1036F TOBACCO NON-USER: CPT | Performed by: PSYCHIATRY & NEUROLOGY

## 2020-12-08 PROCEDURE — 99214 OFFICE O/P EST MOD 30 MIN: CPT | Performed by: PSYCHIATRY & NEUROLOGY

## 2020-12-08 PROCEDURE — 1160F RVW MEDS BY RX/DR IN RCRD: CPT | Performed by: PSYCHIATRY & NEUROLOGY

## 2020-12-08 PROCEDURE — 3008F BODY MASS INDEX DOCD: CPT | Performed by: PSYCHIATRY & NEUROLOGY

## 2020-12-10 ENCOUNTER — TELEPHONE (OUTPATIENT)
Dept: NEUROLOGY | Facility: CLINIC | Age: 75
End: 2020-12-10

## 2021-01-25 ENCOUNTER — OFFICE VISIT (OUTPATIENT)
Dept: NEUROLOGY | Facility: CLINIC | Age: 76
End: 2021-01-25
Payer: COMMERCIAL

## 2021-01-25 VITALS — HEART RATE: 79 BPM | SYSTOLIC BLOOD PRESSURE: 130 MMHG | DIASTOLIC BLOOD PRESSURE: 84 MMHG

## 2021-01-25 DIAGNOSIS — G20 PARKINSON'S DISEASE (HCC): Primary | ICD-10-CM

## 2021-01-25 PROCEDURE — 99214 OFFICE O/P EST MOD 30 MIN: CPT | Performed by: PSYCHIATRY & NEUROLOGY

## 2021-01-25 PROCEDURE — 1036F TOBACCO NON-USER: CPT | Performed by: PSYCHIATRY & NEUROLOGY

## 2021-01-25 PROCEDURE — 1160F RVW MEDS BY RX/DR IN RCRD: CPT | Performed by: PSYCHIATRY & NEUROLOGY

## 2021-01-25 NOTE — ASSESSMENT & PLAN NOTE
44-year-old woman presents in follow-up for Parkinson's disease  Despite the phone note documented previously, the patient and her  report today that the patient has never had britt hallucinations  Overall the patient's freezing of gait is improved with the increased dose rivastigmine  She has fluctuations and inconsistency in her symptom control, which is expected in Parkinson's disease  When off, the patient will use a wheelchair more to assuage the frustration of her shuffling gait than anything else  We discussed a few different options regarding medication management and agreed to decrease the interval between her Sinemet doses to every 3 hours  I am hesitant to increase the dose given her mild dyskinesias and relatively minimal motor signs on exam today      New timinam 11am 2pm 5pm     Follow up in 4 months with Dr Luis F Yeung PA-C

## 2021-01-25 NOTE — PATIENT INSTRUCTIONS
Try taking the carbidopa/levodopa at 8am 11am 2pm 5pm     Try to reflect on your symptoms in terms of the AVERAGE rather than the your worst symptoms  What have the symptoms been like on average

## 2021-01-25 NOTE — PROGRESS NOTES
Assessment/Plan:    Parkinson's disease [facility])  28-year-old woman presents in follow-up for Parkinson's disease  Despite the phone note documented previously, the patient and her  report today that the patient has never had britt hallucinations  Overall the patient's freezing of gait is improved with the increased dose rivastigmine  She has fluctuations and inconsistency in her symptom control, which is expected in Parkinson's disease  When off, the patient will use a wheelchair more to assuage the frustration of her shuffling gait than anything else  We discussed a few different options regarding medication management and agreed to decrease the interval between her Sinemet doses to every 3 hours  I am hesitant to increase the dose given her mild dyskinesias and relatively minimal motor signs on exam today  New timinam 11am 2pm 5pm     Follow up in 4 months with Dr Raj Mcnair PA-C      Anxiety    We discussed the interaction between anxiety and Parkinson's symptoms  I strongly recommended that the patient reestablished care with a counselor and focus on coping strategies  Diagnoses and all orders for this visit:    Park[facility]'s [facility] ([facility] )        Subjective:     Patient ID: Cori Mejia 77 y o  female    I had the pleasure of seeing your Karla Anderson [facility] at the [facility]        To Monika Ernestina is a 28-year-old right-handed woman with levodopa-responsive Parkinson's disease, symptom onset in  with left foot tremor, now complicated by non-motor wearing-off, slight dyskinesias, FoG  In Fall of  she noted some balance trouble and later developed micrographia  She was started on Azilect 1mg with little notable improvement  Neupro patch later added   Sinemet was added later and has been titrated up over time      Prior medication trials:  - Azilect 0 5mg in the evening (stopped by PCP, gait worsened without it)      Current medications and timing:  - Sinemet 25/100; 1 5 tabs 4 times per day; @ 8am, 11:30, 3pm, 6:30 pm  - Neupro Patch 4mg evening    - Exelon patch 9 5mg   - Lexapro 20mg daily      Interval history:  Spending some time in the wheelchair  Some days people cant even tell that she has PD  Not linked to sinemet timing  Very little freezing now if any  She does not think that she was ever having hallucinations  Sinemet makes her "feel a little more human"       The following portions of the patient's history were reviewed and updated as appropriate: allergies, current medications, past family history, past medical history, past social history, past surgical history and problem list     Objective:  /84 (BP Location: Left arm, Patient Position: Standing, Cuff Size: Standard)   Pulse 79     Physical Exam    Neurological Exam  GENERAL MEDICAL EXAMINATION:  General appearance: alert, in no apparent distress  Appropriately dressed and groomed  Conversing and interacting appropriately  Eyes: Sclera are non-injected  Ears, nose, Mouth, Throat: Mucous membranes are moist    Resp: Breathing comfortably on RA   Musculoskeletal: No evidence of deformities  No contractures  No Edema  Skin: No visible rashes  Warm and well perfused  Psych: normal and appropriate affect     Mental Status:  Able to relate history without difficulty  Attentive to conversation  Language is fluent and appropriate with normal prosody  There were no paraphasic errors  Speech was not dysarthric  Able to follow both midline and appendicular commands       General Neurology examination:     UPDRS motor:                              Time since last dose:  2     Speech  0     Facial Expression  1     Rigidity - Neck  0     Rigidity - Upper Extremity (Right)  0     Rigidity - Upper Extremity (Left)   1     Rigidity - Lower Extremity (Right)  0     Rigidity - Lower Extremity (Left)   0     Finger Taps (Right)   0     Finger Taps (Left)   0     Hand Movement (Right)  0     Hand Movement (Left)   0     Pronation/Supination (Right)  0     Pronation/Supination (Left)   0     Toe Tapping (Right) 0     Toe Tapping (Left) 1     Leg Agility (Right)  0     Leg Agility (Left)   0     Arising from Chair        Gait        Freezing of Gait      Postural Stability        Posture      Global spontaneity of movement 1     Postural Tremor (Right) 0     Postural Tremor (Left) 0     Kinetic Tremor (Right)  0     Kinetic Tremor (Left)  0     Rest tremor amplitude RUE 0     Rest tremor amplitude LUE 0     Rest tremor amplitude RLE 0     Reset tremor amplitude LLE 0     Lip/Jaw Tremor  0     Consistency of tremor 0     Motor Exam Total:          Dysmetria: none on FNF  Dyskinesia: none  Dystonia: none   Myoclonus: none     Stance: narrow-based and stable   Stride: normal speed, stride length, step height, walks with no assistive device   Arm swing: symmetric, not reduced   Turn: stable, 2-3 steps     Lean to the left, less arm swing on the rigth     Review of Systems   Constitutional: Negative  Negative for appetite change and fever  HENT: Negative  Negative for hearing loss, tinnitus, trouble swallowing and voice change  Eyes: Negative  Negative for photophobia and pain  Respiratory: Negative  Negative for shortness of breath  Cardiovascular: Negative  Negative for palpitations  Gastrointestinal: Negative  Negative for nausea and vomiting  Endocrine: Negative  Negative for cold intolerance  Genitourinary: Negative  Negative for dysuria, frequency and urgency  Musculoskeletal: Positive for gait problem  Negative for myalgias and neck pain  Skin: Negative  Negative for rash  Neurological: Positive for tremors (feet)  Negative for dizziness, seizures, syncope, facial asymmetry, speech difficulty, weakness, light-headedness, numbness and headaches  Hematological: Negative  Does not bruise/bleed easily     Psychiatric/Behavioral: Negative  Negative for confusion, hallucinations and sleep disturbance  All other systems reviewed and are negative  The above ROS was reviewed and updated  Current Outpatient Medications on File Prior to Visit   Medication Sig Dispense Refill    aspirin (ECOTRIN LOW STRENGTH) 81 mg EC tablet Take 81 mg by mouth daily as needed       carbidopa-levodopa (SINEMET)  mg per tablet Take 1 and half tablets 4 times a day 540 tablet 3    Cholecalciferol (VITAMIN D) 2000 units tablet 1 daily      Cranberry 1000 MG CAPS Take 4,200 mg by mouth as needed       cyanocobalamin (VITAMIN B-12) 1,000 mcg tablet Take 1,000 mcg by mouth daily      escitalopram (LEXAPRO) 20 mg tablet Take 1 tablet (20 mg total) by mouth daily 90 tablet 3    Euthyrox 50 MCG tablet Take 1 tablet by mouth once daily 90 tablet 0    Homeopathic Products (PROSACEA) GEL Apply topically as needed       Multiple Vitamin (MULTIVITAMINS PO) daily      polyethylene glycol (MIRALAX) 17 g packet Take 17 g by mouth as needed       Probiotic Product (ALIGN) CHEW Chew daily       rivastigmine (EXELON) 9 5 mg/24 hr TD 24 hr patch Place 1 patch on the skin daily 30 patch 3    rotigotine (Neupro) 4 MG/24HR Place 1 patch on the skin daily 30 patch 5    Wheat Dextrin (BENEFIBER DRINK MIX PO) Take by mouth as needed       [DISCONTINUED] rasagiline (AZILECT) 1 MG Take 0 5 tablets (0 5 mg total) by mouth daily (Patient not taking: Reported on 1/25/2021) 90 tablet 3     No current facility-administered medications on file prior to visit          Cherry Padilla MD  Medical Director   Movement [address]  [facility]

## 2021-01-25 NOTE — ASSESSMENT & PLAN NOTE
We discussed the interaction between anxiety and Parkinson's symptoms  I strongly recommended that the patient reestablished care with a counselor and focus on coping strategies

## 2021-02-04 DIAGNOSIS — G20 PARKINSON'S DISEASE (HCC): ICD-10-CM

## 2021-02-04 DIAGNOSIS — E03.9 ACQUIRED HYPOTHYROIDISM: ICD-10-CM

## 2021-02-04 RX ORDER — LEVOTHYROXINE SODIUM 0.05 MG/1
TABLET ORAL
Qty: 90 TABLET | Refills: 0 | Status: SHIPPED | OUTPATIENT
Start: 2021-02-04 | End: 2021-03-01

## 2021-02-14 ENCOUNTER — NURSE TRIAGE (OUTPATIENT)
Dept: OTHER | Facility: OTHER | Age: 76
End: 2021-02-14

## 2021-02-14 NOTE — TELEPHONE ENCOUNTER
Regarding: Possible UTI  ----- Message from [facility] sent at 2/14/2021  4:11 PM EST -----  "I believe I have a UTI "

## 2021-02-14 NOTE — TELEPHONE ENCOUNTER
Reason for Disposition   Age > 50 years    Answer Assessment - Initial Assessment Questions  1  SEVERITY: "How bad is the pain?"  (e g , Scale 1-10; mild, moderate, or severe)    - MILD (1-3): complains slightly about urination hurting    - MODERATE (4-7): interferes with normal activities      - SEVERE (8-10): excruciating, unwilling or unable to urinate because of the pain       7 or 8/10 for burning sensation with urination  2  FREQUENCY: "How many times have you had painful urination today?"       Every time she urinates  3  PATTERN: "Is pain present every time you urinate or just sometimes?"       Constant  4  ONSET: "When did the painful urination start?"       10 days ago  5  FEVER: "Do you have a fever?" If so, ask: "What is your temperature, how was it measured, and when did it start?"      Denies  No chills  6  PAST UTI: "Have you had a urine infection before?" If so, ask: "When was the last time?" and "What happened that time?"       Within two years  7  CAUSE: "What do you think is causing the painful urination?"  (e g , UTI, scratch, Herpes sore)      UTI  8  OTHER SYMPTOMS: "Do you have any other symptoms?" (e g , flank pain, vaginal discharge, genital sores, urgency, blood in urine)      No blood in urine  Denies flank pain  No odor  Is still able to empty bladder fully      Protocols used: URINATION PAIN [facility]

## 2021-02-15 ENCOUNTER — APPOINTMENT (OUTPATIENT)
Dept: LAB | Facility: CLINIC | Age: 76
End: 2021-02-15
Payer: COMMERCIAL

## 2021-02-15 ENCOUNTER — OFFICE VISIT (OUTPATIENT)
Dept: INTERNAL MEDICINE CLINIC | Facility: CLINIC | Age: 76
End: 2021-02-15
Payer: COMMERCIAL

## 2021-02-15 VITALS
WEIGHT: 164.2 LBS | HEIGHT: 63 IN | HEART RATE: 88 BPM | OXYGEN SATURATION: 95 % | TEMPERATURE: 97 F | BODY MASS INDEX: 29.09 KG/M2 | DIASTOLIC BLOOD PRESSURE: 82 MMHG | SYSTOLIC BLOOD PRESSURE: 120 MMHG

## 2021-02-15 DIAGNOSIS — R39.9 UTI SYMPTOMS: Primary | ICD-10-CM

## 2021-02-15 DIAGNOSIS — E66.3 OVERWEIGHT: ICD-10-CM

## 2021-02-15 DIAGNOSIS — F33.41 RECURRENT MAJOR DEPRESSIVE DISORDER, IN PARTIAL REMISSION (HCC): ICD-10-CM

## 2021-02-15 DIAGNOSIS — R39.9 UTI SYMPTOMS: ICD-10-CM

## 2021-02-15 DIAGNOSIS — Z00.00 MEDICARE ANNUAL WELLNESS VISIT, SUBSEQUENT: ICD-10-CM

## 2021-02-15 LAB
BACTERIA UR QL AUTO: ABNORMAL /HPF
BILIRUB UR QL STRIP: NEGATIVE
CAOX CRY URNS QL MICRO: ABNORMAL /HPF
CLARITY UR: ABNORMAL
COLOR UR: ABNORMAL
GLUCOSE UR STRIP-MCNC: NEGATIVE MG/DL
HGB UR QL STRIP.AUTO: NEGATIVE
HYALINE CASTS #/AREA URNS LPF: ABNORMAL /LPF
KETONES UR STRIP-MCNC: ABNORMAL MG/DL
LEUKOCYTE ESTERASE UR QL STRIP: ABNORMAL
NITRITE UR QL STRIP: NEGATIVE
NON-SQ EPI CELLS URNS QL MICRO: ABNORMAL /HPF
PH UR STRIP.AUTO: 5.5 [PH]
PROT UR STRIP-MCNC: ABNORMAL MG/DL
RBC #/AREA URNS AUTO: ABNORMAL /HPF
SP GR UR STRIP.AUTO: 1.03 (ref 1–1.03)
UROBILINOGEN UR QL STRIP.AUTO: 0.2 E.U./DL
WBC #/AREA URNS AUTO: ABNORMAL /HPF

## 2021-02-15 PROCEDURE — 99213 OFFICE O/P EST LOW 20 MIN: CPT | Performed by: INTERNAL MEDICINE

## 2021-02-15 PROCEDURE — 3288F FALL RISK ASSESSMENT DOCD: CPT | Performed by: INTERNAL MEDICINE

## 2021-02-15 PROCEDURE — G0439 PPPS, SUBSEQ VISIT: HCPCS | Performed by: INTERNAL MEDICINE

## 2021-02-15 PROCEDURE — 3725F SCREEN DEPRESSION PERFORMED: CPT | Performed by: INTERNAL MEDICINE

## 2021-02-15 PROCEDURE — 1125F AMNT PAIN NOTED PAIN PRSNT: CPT | Performed by: INTERNAL MEDICINE

## 2021-02-15 PROCEDURE — 1170F FXNL STATUS ASSESSED: CPT | Performed by: INTERNAL MEDICINE

## 2021-02-15 PROCEDURE — 81001 URINALYSIS AUTO W/SCOPE: CPT | Performed by: INTERNAL MEDICINE

## 2021-02-15 PROCEDURE — 87086 URINE CULTURE/COLONY COUNT: CPT

## 2021-02-15 RX ORDER — CIPROFLOXACIN 500 MG/1
500 TABLET, FILM COATED ORAL EVERY 12 HOURS SCHEDULED
Qty: 14 TABLET | Refills: 0 | Status: SHIPPED | OUTPATIENT
Start: 2021-02-15 | End: 2021-02-22

## 2021-02-15 NOTE — PROGRESS NOTES
INTERNAL MEDICINE FOLLOW-UP OFFICE VISIT  [facility] BEHAV[facility] MEDIC[facility] AT [facility]    NAME: Shiv Cunningham  AGE: 77 y o  SEX: female  : 1945   MRN: [id number]    DATE: 2/15/2021  TIME: 11:47 AM    Assessment and Plan     Diagnoses and all orders for this visit:    UTI symptoms  -     Urinalysis with microscopic  -     Urine culture; Future  -     ciprofloxacin (CIPRO) 500 mg tablet; Take 1 tablet (500 mg total) by mouth every 12 (twelve) hours for 7 days    Recurrent major depressive disorder, in partial remission ([facility] )   continue Lexapro    Overweight  BMI Counseling: Body mass index is 29 09 kg/m²  The BMI is above normal  Nutrition recommendations include decreasing portion sizes, encouraging healthy choices of fruits and vegetables and moderation in carbohydrate intake  Exercise recommendations include moderate physical activity 150 minutes/week  No pharmacotherapy was ordered  Medicare annual wellness visit, subsequent        - Counseling Documentation: patient was counseled regarding: instructions for management, risk factor reductions, prognosis, patient and family education, risks and benefits of treatment options and importance of compliance with treatment  - Medication Side Effects: Adverse side effects of medications were reviewed with the patient/guardian today  Return to office in: as needed    Chief Complaint     Chief Complaint   Patient presents with    Urinary Tract Infection    Medicare Wellness Visit       History of Present Illness     Urinary Tract Infection   This is a new problem  The current episode started in the past 7 days  The problem occurs every urination  The problem has been unchanged  The quality of the pain is described as burning  The pain is at a severity of 5/10  The pain is moderate  There has been no fever  She is not sexually active  There is no history of pyelonephritis  Associated symptoms include chills, frequency and urgency   Pertinent negatives include no flank pain, hematuria, nausea or vomiting  She has tried nothing for the symptoms  The following portions of the patient's history were reviewed and updated as appropriate: allergies, current medications, past family history, past medical history, past social history, past surgical history and problem list     Review of Systems     Review of Systems   Constitutional: Positive for chills  Negative for diaphoresis, fatigue and fever  HENT: Negative for congestion, ear discharge, ear pain, hearing loss, postnasal drip, rhinorrhea, sinus pressure, sinus pain, sneezing, sore throat and voice change  Eyes: Negative for pain, discharge, redness and visual disturbance  Respiratory: Negative for cough, chest tightness, shortness of breath and wheezing  Cardiovascular: Negative for chest pain, palpitations and leg swelling  Gastrointestinal: Negative for abdominal distention, abdominal pain, blood in stool, constipation, diarrhea, nausea and vomiting  Endocrine: Negative for cold intolerance, heat intolerance, polydipsia, polyphagia and polyuria  Genitourinary: Positive for dysuria, frequency and urgency  Negative for flank pain and hematuria  Musculoskeletal: Negative for arthralgias, back pain, gait problem, joint swelling, myalgias, neck pain and neck stiffness  Skin: Negative for rash  Neurological: Negative for dizziness, tremors, syncope, facial asymmetry, speech difficulty, weakness, light-headedness, numbness and headaches  Hematological: Does not bruise/bleed easily  Psychiatric/Behavioral: Negative for behavioral problems, confusion and sleep disturbance  The patient is not nervous/anxious          Active Problem List     Patient Active Problem List   Diagnosis    [facility]'s disease ([facility] )    Scoliosis    Low back pain    Acquired hypothyroidism    Elevated antinuclear antibody (RAS) level    Family hx-breast malignancy    Mixed hyperlipidemia    Rosacea    Trochanteric bursitis    Urinary incontinence    Vitamin D deficiency    Irritable bowel syndrome with both constipation and diarrhea    REM behavioral disorder    Anxiety    Recurrent major depressive disorder, in partial remission ([address] )    Overweight    Osteopenia of multiple sites       Objective     /82 (BP Location: Left arm, Patient Position: Sitting, Cuff Size: Standard)   Pulse 88   Temp (!) 97 °F (36 1 °C)   Ht 5' 3" (1 6 m)   Wt 74 5 kg (164 lb 3 2 oz)   SpO2 95%   BMI 29 09 kg/m²     Physical Exam  Constitutional:       General: She is not in acute distress  Appearance: She is well-developed  She is not diaphoretic  HENT:      Head: Normocephalic and atraumatic  Right Ear: External ear normal       Left Ear: External ear normal       Nose: Nose normal    Eyes:      General: No scleral icterus  Right eye: No discharge  Left eye: No discharge  Conjunctiva/sclera: Conjunctivae normal    Neck:      Musculoskeletal: Normal range of motion and neck supple  Thyroid: No thyromegaly  Vascular: No JVD  Trachea: No tracheal deviation  Cardiovascular:      Rate and Rhythm: Normal rate and regular rhythm  Heart sounds: Normal heart sounds  No murmur  No friction rub  No gallop  Pulmonary:      Effort: Pulmonary effort is normal  No respiratory distress  Breath sounds: Normal breath sounds  No wheezing or rales  Chest:      Chest wall: No tenderness  Abdominal:      General: Bowel sounds are normal  There is no distension  Palpations: Abdomen is soft  Tenderness: There is no abdominal tenderness  There is no guarding or rebound  Comments:  Has tenderness in the flanks   Musculoskeletal: Normal range of motion  General: No tenderness  Lymphadenopathy:      Cervical: No cervical adenopathy  Skin:     General: Skin is warm and dry  Findings: No erythema or rash     Neurological:      Mental Status: She is alert and oriented to person, place, and time  Cranial Nerves: No cranial nerve deficit  Motor: No abnormal muscle tone        Coordination: Coordination normal    Psychiatric:         Judgment: Judgment normal          Current Medications       Current Outpatient Medications:     aspirin (ECOTRIN LOW STRENGTH) 81 mg EC tablet, Take 81 mg by mouth daily as needed , Disp: , Rfl:     carbidopa-levodopa (SINEMET)  mg per tablet, TAKE 1 & 1/2 (ONE & ONE-HALF) TABLETS BY MOUTH 4 TIMES DAILY, Disp: 540 tablet, Rfl: 0    Cholecalciferol (VITAMIN D) 2000 units tablet, 1 daily, Disp: , Rfl:     Cranberry 1000 MG CAPS, Take 4,200 mg by mouth as needed , Disp: , Rfl:     cyanocobalamin (VITAMIN B-12) 1,000 mcg tablet, Take 1,000 mcg by mouth daily, Disp: , Rfl:     escitalopram (LEXAPRO) 20 mg tablet, Take 1 tablet (20 mg total) by mouth daily, Disp: 90 tablet, Rfl: 3    Homeopathic Products (PROSACEA) GEL, Apply topically as needed , Disp: , Rfl:     levothyroxine 50 mcg tablet, Take 1 tablet by mouth once daily, Disp: 90 tablet, Rfl: 0    Multiple Vitamin (MULTIVITAMINS PO), daily, Disp: , Rfl:     polyethylene glycol (MIRALAX) 17 g packet, Take 17 g by mouth as needed , Disp: , Rfl:     rivastigmine (EXELON) 9 5 mg/24 hr TD 24 hr patch, Place 1 patch on the skin daily, Disp: 30 patch, Rfl: 3    rotigotine (Neupro) 4 MG/24HR, Place 1 patch on the skin daily, Disp: 30 patch, Rfl: 5    ciprofloxacin (CIPRO) 500 mg tablet, Take 1 tablet (500 mg total) by mouth every 12 (twelve) hours for 7 days, Disp: 14 tablet, Rfl: 0    Probiotic Product (ALIGN) CHEW, Chew daily , Disp: , Rfl:     Wheat Dextrin (BENEFIBER DRINK MIX PO), Take by mouth as needed , Disp: , Rfl:     Health Maintenance     Health Maintenance   Topic Date Due    Medicare Annual Wellness Visit (AWV)  02/12/2021    BMI: Followup Plan  06/24/2021    COVID-19 Vaccine (2 of 2 - Moderna series) 02/23/2021    Fall Risk  08/25/2021    BMI: Adult  02/15/2022    Depression Remission PHQ  02/15/2022    DXA SCAN  06/10/2022    DTaP,Tdap,and Td Vaccines (2 - Td) 10/25/2022    Colorectal Cancer Screening  09/05/2028    Hepatitis C Screening  Completed    Pneumococcal Vaccine: 65+ Years  Completed    Influenza Vaccine  Completed    HIB Vaccine  Aged Out    Hepatitis B Vaccine  Aged Out    IPV Vaccine  Aged Out    Hepatitis A Vaccine  Aged Out    Meningococcal ACWY Vaccine  Aged Out    HPV Vaccine  Aged Out     Immunization History   Administered Date(s) Administered    H1N1 Inj 12/06/2009    H1N1, All Formulations 12/14/2009, 12/14/2009    Hep A, adult 06/06/1996, 05/22/1997    INFLUENZA 12/04/2006, 11/07/2007, 10/03/2008, 02/12/2010, 09/15/2010, 09/21/2011, 10/25/2012, 11/01/2013, 10/15/2014, 10/05/2015, 10/02/2016, 11/13/2017, 09/18/2018    Influenza Quadrivalent 3 years and older 10/05/2015    Influenza Quadrivalent 6 mos and older IM 09/18/2018    Influenza, high dose seasonal 0 7 mL 09/22/2020    Influenza, seasonal, injectable 12/04/2006, 11/07/2007, 10/03/2008, 02/12/2010, 09/15/2010, 09/21/2011, 10/25/2012, 11/01/2013, 10/15/2014    Pneumococcal Conjugate 13-Valent 05/03/2016    Pneumococcal Polysaccharide PPV23 03/17/2011    SARS-CoV-2 / COVID-19 mRNA IM (John Radish) 01/26/2021    Td (adult), adsorbed 06/10/2002    Tdap 10/25/2012    Zoster 09/12/2012    influenza, trivalent, adjuvanted 10/14/2019         Charlee Dee MD  [id number] [facility] of BEHAVIORAL MEDICINE AT [facility]

## 2021-02-15 NOTE — PROGRESS NOTES
Assessment and Plan:     Problem List Items Addressed This Visit        Other    Recurrent major depressive disorder, in partial remission ([facility] [address] )    Overweight      Other Visit Diagnoses     UTI symptoms    -  Primary    Relevant Medications    ciprofloxacin (CIPRO) 500 mg tablet    Other Relevant Orders    Urinalysis with microscopic    Urine culture    Medicare annual wellness visit, subsequent               Preventive health issues were discussed with patient, and age appropriate screening tests were ordered as noted in patient's After Visit Summary  Personalized health advice and appropriate referrals for health education or preventive services given if needed, as noted in patient's After Visit Summary       History of Present Illness:     Patient presents for Medicare Annual Wellness visit    Patient Care Team:  Brian Gonzales MD as PCP - General (Internal Medicine)  Brian Gonzales MD as PCP - [address] [place] [address] (RTE)  MD Dominga Xavier PA-C as Physician Assistant (Gastroenterology)  Gerhardt Kitten, MD as Endoscopist     Problem List:     Patient Active Problem List   Diagnosis    Parkinson's disease [facility])    Scoliosis    Low back pain    Acquired hypothyroidism    Elevated antinuclear antibody (RAS) level    Family hx-breast malignancy    Mixed hyperlipidemia    Rosacea    Trochanteric bursitis    Urinary incontinence    Vitamin D deficiency    Irritable bowel syndrome with both constipation and diarrhea    REM behavioral disorder    Anxiety    Recurrent major depressive disorder, in partial remission ([facility] [address] )    Overweight    Osteopenia of multiple sites      Past Medical and Surgical History:     Past Medical History:   Diagnosis Date    Anxiety     Disease of thyroid gland     Generalized anxiety disorder 4/23/2015    Hepatic disease     Infectious viral hepatitis     Liver disease     Osteoporosis     Overweight (BMI 25 0-29 9) 2/12/2020    Parkinson's disease [facility])     Patient refuses to disclose advance directive information 5/23/2018    Overview:  Yes, Patient instructed to provide copy of advance directive for provider to review and to be scanned into Electronic Medical Record  Overview:  Yes, Patient instructed to provide copy of advance directive for provider to review and to be scanned into Electronic Medical Record    Rosacea     Senile osteoporosis 5/27/2003    Shortness of breath      Past Surgical History:   Procedure Laterality Date    FOOT SURGERY Bilateral     HYSTERECTOMY      RI ESOPHAGOGASTRODUODENOSCOPY TRANSORAL DIAGNOSTIC N/A 9/5/2018    Procedure: EGD AND COLONOSCOPY;  Surgeon: Sushil Miller MD;  Location: [facility];   Service: Gastroenterology    URETHRA SURGERY      laparoscopic urethral suspension for stress incontinence      Family History:     Family History   Problem Relation Age of Onset    Lung cancer Mother     Dementia Mother     Cancer Mother     Lung cancer Father     Cancer Father     Esophageal cancer Brother     Cancer Brother     Cavazos's esophagus Brother     Esophageal cancer Family     Heart disease Family     Stroke Family         syndrome      Social History:     E-Cigarette/Vaping    E-Cigarette Use Never User      E-Cigarette/Vaping Substances    Nicotine No     THC No     CBD No     Flavoring No     Other No     Unknown No      Social History     Socioeconomic History    Marital status: Significant Other     Spouse name: None    Number of children: None    Years of education: Master's Degree    Highest education level: None   Occupational History     Comment: Retired   Social Needs    Financial resource strain: None    Food insecurity     Worry: None     Inability: None    Transportation needs     Medical: None     Non-medical: None   Tobacco Use    Smoking status: Never Smoker    Smokeless tobacco: Never Used   Substance and Sexual Activity    Alcohol use: Not Currently Alcohol/week: 1 0 standard drinks     Types: 1 Glasses of wine per week     Frequency: Monthly or less     Drinks per session: 1 or 2     Binge frequency: Never     Comment: Alcohol use per [employer]    Drug use: No    Sexual activity: Never   Lifestyle    Physical activity     Days per week: 1 day     Minutes per session: 10 min    Stress:  Only a little   Relationships    Social connections     Talks on phone: None     Gets together: None     Attends Mandaen service: None     Active member of club or organization: None     Attends meetings of clubs or organizations: None     Relationship status: None    Intimate partner violence     Fear of current or ex partner: None     Emotionally abused: None     Physically abused: None     Forced sexual activity: None   Other Topics Concern    None   Social History Narrative    Denied:  History of caffeine use      Medications and Allergies:     Current Outpatient Medications   Medication Sig Dispense Refill    aspirin (ECOTRIN LOW STRENGTH) 81 mg EC tablet Take 81 mg by mouth daily as needed       carbidopa-levodopa (SINEMET)  mg per tablet TAKE 1 & 1/2 (ONE & ONE-HALF) TABLETS BY MOUTH 4 TIMES DAILY 540 tablet 0    Cholecalciferol (VITAMIN D) 2000 units tablet 1 daily      Cranberry 1000 MG CAPS Take 4,200 mg by mouth as needed       cyanocobalamin (VITAMIN B-12) 1,000 mcg tablet Take 1,000 mcg by mouth daily      escitalopram (LEXAPRO) 20 mg tablet Take 1 tablet (20 mg total) by mouth daily 90 tablet 3    Homeopathic Products (PROSACEA) GEL Apply topically as needed       levothyroxine 50 mcg tablet Take 1 tablet by mouth once daily 90 tablet 0    Multiple Vitamin (MULTIVITAMINS PO) daily      polyethylene glycol (MIRALAX) 17 g packet Take 17 g by mouth as needed       rivastigmine (EXELON) 9 5 mg/24 hr TD 24 hr patch Place 1 patch on the skin daily 30 patch 3    rotigotine (Neupro) 4 MG/24HR Place 1 patch on the skin daily 30 patch 5    ciprofloxacin (CIPRO) 500 mg tablet Take 1 tablet (500 mg total) by mouth every 12 (twelve) hours for 7 days 14 tablet 0    Probiotic Product (ALIGN) CHEW Chew daily       Wheat Dextrin (BENEFIBER DRINK MIX PO) Take by mouth as needed        No current facility-administered medications for this visit  Allergies   Allergen Reactions    Statins Other (See Comments)     jaundice    Naproxen Hives    Simvastatin Other (See Comments)     Jaundice    Dust Mite Extract Other (See Comments)     Per pt states does not know the type of reaction    Pollen Extract Sneezing and Nasal Congestion      Immunizations:     Immunization History   Administered Date(s) Administered    H1N1 Inj 12/06/2009    H1N1, All Formulations 12/14/2009, 12/14/2009    Hep A, adult 06/06/1996, 05/22/1997    INFLUENZA 12/04/2006, 11/07/2007, 10/03/2008, 02/12/2010, 09/15/2010, 09/21/2011, 10/25/2012, 11/01/2013, 10/15/2014, 10/05/2015, 10/02/2016, 11/13/2017, 09/18/2018    Influenza Quadrivalent 3 years and older 10/05/2015    Influenza Quadrivalent 6 mos and older IM 09/18/2018    Influenza, high dose seasonal 0 7 mL 09/22/2020    Influenza, seasonal, injectable 12/04/2006, 11/07/2007, 10/03/2008, 02/12/2010, 09/15/2010, 09/21/2011, 10/25/2012, 11/01/2013, 10/15/2014    Pneumococcal Conjugate 13-Valent 05/03/2016    Pneumococcal Polysaccharide PPV23 03/17/2011    SARS-CoV-2 / COVID-19 mRNA IM (Nolvia Douglass) 01/26/2021    Td (adult), adsorbed 06/10/2002    Tdap 10/25/2012    Zoster 09/12/2012    influenza, trivalent, adjuvanted 10/14/2019      Health Maintenance:         Topic Date Due    DXA SCAN  06/10/2022    Colorectal Cancer Screening  09/05/2028    Hepatitis C Screening  Completed     There are no preventive care reminders to display for this patient     Medicare Health Risk Assessment:     /82 (BP Location: Left arm, Patient Position: Sitting, Cuff Size: Standard)   Pulse 88   Temp (!) 97 °F (36 1 °C)   Ht 5' 3" (1 6 m)   Wt 74 5 kg (164 lb 3 2 oz)   SpO2 95%   BMI 29 09 kg/m²      Clarita is here for her Subsequent Wellness visit  Health Risk Assessment:   Patient rates overall health as fair  Patient feels that their physical health rating is slightly worse  Eyesight was rated as slightly worse  Hearing was rated as same  Patient feels that their emotional and mental health rating is slightly better  Pain experienced in the last 7 days has been some  Patient's pain rating has been 5/10  Patient states that she has experienced no weight loss or gain in last 6 months  Depression Screening:   PHQ-2 Score: 1  PHQ-9 Score: 4      Fall Risk Screening: In the past year, patient has experienced: history of falling in past year    Number of falls: 1  Injured during fall?: Yes    Feels unsteady when standing or walking?: Yes    Worried about falling?: Yes      Urinary Incontinence Screening:   Patient has leaked urine accidently in the last six months  Home Safety:  Patient has trouble with stairs inside or outside of their home  Patient has working smoke alarms and has working carbon monoxide detector  Home safety hazards include: loose rugs on the floor  Nutrition:   Current diet is Regular and Limited junk food  Medications:   Patient is currently taking over-the-counter supplements  OTC medications include: see medication list  Patient is able to manage medications  Activities of Daily Living (ADLs)/Instrumental Activities of Daily Living (IADLs):   Walk and transfer into and out of bed and chair?: Yes  Dress and groom yourself?: Yes    Bathe or shower yourself?: Yes    Feed yourself?  Yes  Do your laundry/housekeeping?: Yes  Manage your money, pay your bills and track your expenses?: Yes  Make your own meals?: Yes    Do your own shopping?: No    Previous Hospitalizations:   Any hospitalizations or ED visits within the last 12 months?: No      Advance Care Planning:   Living will: Yes    Advanced directive: Yes      Cognitive Screening:   Provider or family/friend/caregiver concerned regarding cognition?: No    PREVENTIVE SCREENINGS      Cardiovascular Screening:    General: Screening Not Indicated and History Lipid Disorder      Diabetes Screening:     General: Screening Current      Colorectal Cancer Screening:     General: Screening Current      Breast Cancer Screening:     General: Risks and Benefits Discussed      Cervical Cancer Screening:    General: Screening Not Indicated      Osteoporosis Screening:    General: Screening Not Indicated and History Osteoporosis      Abdominal Aortic Aneurysm (AAA) Screening:        General: Screening Not Indicated      Lung Cancer Screening:     General: Screening Not Indicated      Hepatitis C Screening:    General: Screening Current    Other Counseling Topics:   Alcohol use counseling, car/seat belt/driving safety, skin self-exam, sunscreen and calcium and vitamin D intake and regular weightbearing exercise         Aaliyah Modi MD

## 2021-02-15 NOTE — TELEPHONE ENCOUNTER
PT  HAS APPT TODAY WITH DR Bashir Mckinney Percutaneous Coronary Intervention: What to Expect at [facility]  Percutaneous coronary intervention (PCI) is the name for procedures that are used to open a blocked coronary artery. The two most common PCI procedures are coronary angioplasty and coronary stent placement. Your groin or arm may have a bruise and feel sore for a day or two after a percutaneous coronary intervention (PCI). You can do light activities around the house, but nothing strenuous for several days. This care sheet gives you a general idea about how long it will take for you to recover. But each person recovers at a different pace. Follow the steps below to get better as quickly as possible. How can you care for yourself at home? Activity  · Do not do strenuous exercise and do not lift anything heavy until your doctor says it is okay. You can walk around the house and do light activity, such as cooking. · For 7-10 days after you go home, do not take baths. Showers are okay. · Try not to walk up stairs for the first couple of days (if the catheter was placed in the artery in your groin). · Go back to regular exercise after seen by cardiologist. Exercise is good for your heart. Get at least 30 minutes of physical activity on most days of the week. Walking is a good choice. If your doctor says it is okay, you also may want to do other activities, such as running, swimming, cycling, or playing tennis. Diet  · Drink plenty of fluids to help your body flush out the dye. If you have kidney, heart, or liver disease and have to limit fluids, talk with your doctor before you increase the amount of fluids you drink. · Keep eating a heart-healthy, low-fat diet that has lots of fruits, vegetables, and whole grains. If you have not been eating this way, talk to your doctor. You also may want to talk to a dietitian. This expert can help you to learn about healthy foods and plan meals.   Medicines  · Your doctor may have prescribed a blood-thinning medicine like aspirin and/or Plavix. It is very important that you take these medicines daily exactly as directed in order to keep the coronary artery open and reduce your risk of a heart attack. · Call your doctor if you think you are having a problem with your medicine. Care of the catheter site  · For 1 or 2 days, you may keep a bandage over the spot where the catheter was inserted if needed. Most often, the bandage may be removed at discharge. · Put ice or a cold pack on the area for 10 to 15 minutes at a time to help with soreness or swelling. Put a thin cloth between the ice and your skin. Follow-up care is a key part of your treatment and safety. Be sure to make and go to all appointments, and call your doctor if you are having problems. Its also a good idea to know your test results. Keep an updated list of your medicines to show all medical providers. When should you call for help? Call 911 anytime you think you may need emergency care. For example, call if:  · You pass out (lose consciousness). · You have severe trouble breathing. · You have sudden chest pain and shortness of breath, or you cough up blood. · You have chest pain or pressure. This may occur with:  ¨ Sweating. ¨ Shortness of breath. ¨ Nausea or vomiting. ¨ Pain that spreads from the chest to the neck, jaw, or one or both shoulders or arms. ¨ Dizziness or lightheadedness. ¨ A fast or uneven pulse. After calling 911, chew 1 adult aspirin. Wait for an ambulance. Do not try to drive yourself. · You have been diagnosed with angina, and you have chest pain that does not go away with rest or is not getting better within 5 minutes after you take a dose of nitroglycerin. Call your doctor now or seek immediate medical care if:  · You are bleeding from the area where the catheter was put in your artery. · You have signs of infection, such as:  ¨ Increased pain, swelling, warmth, or redness.   ¨ Red streaks leading from the catheter site. ¨ Pus draining from the catheter site. ¨ Swollen lymph nodes in your neck, armpits, or groin. ¨ A fever. · Your leg or arm looks blue or feels cold, numb, or tingly. Watch closely for changes in your health, and be sure to contact your doctor if you have any problems. Where can you learn more? Go to Lightningcast.be  Enter B506 in the search box to learn more about \"Percutaneous Coronary Intervention: What to Expect at Home\". This care instruction is for use with your licensed healthcare professional. If you have questions about a medical condition or this instruction, always ask your healthcare professional. Care instructions adapted by [facility] (which disclaims liability or warranty for this information) from Gayle Turcios, [address] [place] [address] 2008. [employer] disclaims any warranty or liability for your use of this information. Patient Education        Heart-Healthy Diet: Care Instructions  Your Care Instructions    A heart-healthy diet has lots of vegetables, fruits, nuts, beans, and whole grains, and is low in salt. It limits foods that are high in saturated fat, such as meats, cheeses, and fried foods. It may be hard to change your diet, but even small changes can lower your risk of heart attack and heart disease. Follow-up care is a key part of your treatment and safety. Be sure to make and go to all appointments, and call your doctor if you are having problems. It's also a good idea to know your test results and keep a list of the medicines you take. How can you care for yourself at home? Watch your portions  · Learn what a serving is. A \"serving\" and a \"portion\" are not always the same thing. Make sure that you are not eating larger portions than are recommended. For example, a serving of pasta is ½ cup. A serving size of meat is 2 to 3 ounces. A 3-ounce serving is about the size of a deck of cards.  Measure serving sizes until you are good at \"eyeballing\" them. Keep in mind that restaurants often serve portions that are 2 or 3 times the size of one serving. · To keep your energy level up and keep you from feeling hungry, eat often but in smaller portions. · Eat only the number of calories you need to stay at a healthy weight. If you need to lose weight, eat fewer calories than your body burns (through exercise and other physical activity). Eat more fruits and vegetables  · Eat a variety of fruit and vegetables every day. Dark green, deep orange, red, or yellow fruits and vegetables are especially good for you. Examples include spinach, carrots, peaches, and berries. · Keep carrots, celery, and other veggies handy for snacks. Buy fruit that is in season and store it where you can see it so that you will be tempted to eat it. · Cook dishes that have a lot of veggies in them, such as stir-fries and soups. Limit saturated and trans fat  · Read food labels, and try to avoid saturated and trans fats. They increase your risk of heart disease. Trans fat is found in many processed foods such as cookies and crackers. · Use olive or canola oil when you cook. Try cholesterol-lowering spreads, such as Benecol or Take Control. · Bake, broil, grill, or steam foods instead of frying them. · Choose lean meats instead of high-fat meats such as hot dogs and sausages. Cut off all visible fat when you prepare meat. · Eat fish, skinless poultry, and meat alternatives such as soy products instead of high-fat meats. Soy products, such as tofu, may be especially good for your heart. · Choose low-fat or fat-free milk and dairy products. Eat fish  · Eat at least two servings of fish a week. Certain fish, such as salmon and tuna, contain omega-3 fatty acids, which may help reduce your risk of heart attack. Eat foods high in fiber  · Eat a variety of grain products every day. Include whole-grain foods that have lots of fiber and nutrients.  Examples of whole-grain foods include oats, whole wheat bread, and brown rice. · Buy whole-grain breads and cereals, instead of white bread or pastries. Limit salt and sodium  · Limit how much salt and sodium you eat to help lower your blood pressure. · Taste food before you salt it. Add only a little salt when you think you need it. With time, your taste buds will adjust to less salt. · Eat fewer snack items, fast foods, and other high-salt, processed foods. Check food labels for the amount of sodium in packaged foods. · Choose low-sodium versions of canned goods (such as soups, vegetables, and beans). Limit sugar  · Limit drinks and foods with added sugar. These include candy, desserts, and soda pop. Limit alcohol  · Limit alcohol to no more than 2 drinks a day for men and 1 drink a day for women. Too much alcohol can cause health problems. When should you call for help? Watch closely for changes in your health, and be sure to contact your doctor if:    · You would like help planning heart-healthy meals. Where can you learn more? Go to http://tae-edel.info/. Enter V137 in the search box to learn more about \"Heart-Healthy Diet: Care Instructions. \"  Current as of: July 22, 2018  Content Version: 11.9  © 8427-3287 [employer]. Care instructions adapted under license by [email or web address] (which disclaims liability or warranty for this information). If you have questions about a medical condition or this instruction, always ask your healthcare professional. [place] [address] any warranty or liability for your use of this information. Patient Education        Secondhand Smoke: Care Instructions  Your Care Instructions    Secondhand smoke comes from the burning end of a cigarette, cigar, or pipe and the smoke that a smoker exhales. The smoke contains nicotine and many other harmful chemicals.  Breathing secondhand smoke can cause or worsen health problems including cancer, asthma, coronary artery disease, and respiratory infections. It can make your eyes and nose burn and cause a sore throat. Secondhand smoke is especially bad for babies and young children whose lungs are still developing. Babies whose parents smoke are more likely to have ear infections, pneumonia, and bronchitis in the first few years of their lives. Secondhand smoke can make asthma symptoms worse in children. If you are pregnant, it is important that you not smoke and that you avoid secondhand smoke. You are more likely to give birth to a baby who weighs less than expected (low birth weight) if you smoke. And your baby may have a greater risk for sudden infant death syndrome (SIDS). Babies whose mothers are exposed to secondhand smoke during pregnancy have a higher risk for health problems. Follow-up care is a key part of your treatment and safety. Be sure to make and go to all appointments, and call your doctor if you are having problems. It's also a good idea to know your test results and keep a list of the medicines you take. How can you care for yourself at home? · Do not smoke or let anyone else smoke in your home. If people must smoke, ask them to go outside. · If people do smoke in your home, choose a room where you can open a window or use a fan to get the smoke outside. · Do not let anyone smoke in your car. If someone must smoke, pull over in a safe place and let him or her smoke away from the car. · Ask your employer to make sure that you have a smoke-free work area. · Make sure that your children are not exposed to secondhand smoke at day care, school, and after-school programs. · Try to choose nonsmoking bars, restaurants, and other public places when you go out. · Help your family and friends who smoke to quit by encouraging them to try. Tell them about treatment resources. Having support from others often helps. · If you smoke, quit.  Quitting is hard, but there are ways to boost your chance of quitting tobacco for good. ? Use nicotine gum, patches, or lozenges. Call a quitline. Ask your doctor about stop-smoking programs and medicines. ? Keep trying. When should you call for help? Watch closely for changes in your health, and be sure to contact your doctor if you have any problems. Where can you learn more? Go to http://tae-edel.info/. Enter L004 in the search box to learn more about \"Secondhand Smoke: Care Instructions. \"  Current as of: September 26, 2018  Content Version: 11.9  © 1522-6724 [employer]. Care instructions adapted under license by [employer] (which disclaims liability or warranty for this information). If you have questions about a medical condition or this instruction, always ask your healthcare professional. [place] [address] any warranty or liability for your use of this information. DISCHARGE SUMMARY from Nurse    PATIENT INSTRUCTIONS:    After general anesthesia or intravenous sedation, for 24 hours or while taking prescription Narcotics:  · Limit your activities  · Do not drive and operate hazardous machinery  · Do not make important personal or business decisions  · Do  not drink alcoholic beverages  · If you have not urinated within 8 hours after discharge, please contact your surgeon on call. Report the following to your surgeon:  · Excessive pain, swelling, redness or odor of or around the surgical area  · Temperature over 100.5  · Nausea and vomiting lasting longer than 4 hours or if unable to take medications  · Any signs of decreased circulation or nerve impairment to extremity: change in color, persistent  numbness, tingling, coldness or increase pain  · Any questions    What to do at Home:    If you experience any of the following symptoms chest pain, palpitations, or shortness of breath, please follow up with cardiology.     *  Please give a list of your current medications to your Primary Care Provider. *  Please update this list whenever your medications are discontinued, doses are      changed, or new medications (including over-the-counter products) are added. *  Please carry medication information at all times in case of emergency situations. These are general instructions for a healthy lifestyle:    No smoking/ No tobacco products/ Avoid exposure to second hand smoke  Surgeon General's Warning:  Quitting smoking now greatly reduces serious risk to your health. Obesity, smoking, and sedentary lifestyle greatly increases your risk for illness    A healthy diet, regular physical exercise & weight monitoring are important for maintaining a healthy lifestyle    You may be retaining fluid if you have a history of heart failure or if you experience any of the following symptoms:  Weight gain of 3 pounds or more overnight or 5 pounds in a week, increased swelling in our hands or feet or shortness of breath while lying flat in bed. Please call your doctor as soon as you notice any of these symptoms; do not wait until your next office visit. Recognize signs and symptoms of STROKE:    F-face looks uneven    A-arms unable to move or move unevenly    S-speech slurred or non-existent    T-time-call 911 as soon as signs and symptoms begin-DO NOT go       Back to bed or wait to see if you get better-TIME IS BRAIN. Warning Signs of HEART ATTACK     Call 911 if you have these symptoms:   Chest discomfort. Most heart attacks involve discomfort in the center of the chest that lasts more than a few minutes, or that goes away and comes back. It can feel like uncomfortable pressure, squeezing, fullness, or pain.  Discomfort in other areas of the upper body. Symptoms can include pain or discomfort in one or both arms, the back, neck, jaw, or stomach.  Shortness of breath with or without chest discomfort.    Other signs may include breaking out in a cold sweat, nausea, or lightheadedness. Don't wait more than five minutes to call 911 - MINUTES MATTER! Fast action can save your life. Calling 911 is almost always the fastest way to get lifesaving treatment. Emergency Medical Services staff can begin treatment when they arrive -- up to an hour sooner than if someone gets to the hospital by car. The discharge information has been reviewed with the patient. The patient verbalized understanding. Discharge medications reviewed with the patient and appropriate educational materials and side effects teaching were provided.   ___________________________________________________________________________________________________________________________________

## 2021-02-16 LAB — BACTERIA UR CULT: NORMAL

## 2021-02-17 ENCOUNTER — TELEPHONE (OUTPATIENT)
Dept: INTERNAL MEDICINE CLINIC | Facility: CLINIC | Age: 76
End: 2021-02-17

## 2021-02-17 ENCOUNTER — LAB (OUTPATIENT)
Dept: LAB | Facility: CLINIC | Age: 76
End: 2021-02-17
Payer: COMMERCIAL

## 2021-02-17 DIAGNOSIS — E03.9 ACQUIRED HYPOTHYROIDISM: ICD-10-CM

## 2021-02-17 DIAGNOSIS — E78.2 MIXED HYPERLIPIDEMIA: ICD-10-CM

## 2021-02-17 LAB
ALBUMIN SERPL BCP-MCNC: 3.6 G/DL (ref 3.5–5)
ALP SERPL-CCNC: 92 U/L (ref 46–116)
ALT SERPL W P-5'-P-CCNC: 23 U/L (ref 12–78)
ANION GAP SERPL CALCULATED.3IONS-SCNC: 6 MMOL/L (ref 4–13)
AST SERPL W P-5'-P-CCNC: 13 U/L (ref 5–45)
BASOPHILS # BLD AUTO: 0.08 THOUSANDS/ΜL (ref 0–0.1)
BASOPHILS NFR BLD AUTO: 1 % (ref 0–1)
BILIRUB SERPL-MCNC: 0.54 MG/DL (ref 0.2–1)
BUN SERPL-MCNC: 20 MG/DL (ref 5–25)
CALCIUM SERPL-MCNC: 8.9 MG/DL (ref 8.3–10.1)
CHLORIDE SERPL-SCNC: 106 MMOL/L (ref 100–108)
CHOLEST SERPL-MCNC: 218 MG/DL (ref 50–200)
CO2 SERPL-SCNC: 28 MMOL/L (ref 21–32)
CREAT SERPL-MCNC: 0.86 MG/DL (ref 0.6–1.3)
EOSINOPHIL # BLD AUTO: 0.54 THOUSAND/ΜL (ref 0–0.61)
EOSINOPHIL NFR BLD AUTO: 7 % (ref 0–6)
ERYTHROCYTE [DISTWIDTH] IN BLOOD BY AUTOMATED COUNT: 13.7 % (ref 11.6–15.1)
GFR SERPL CREATININE-BSD FRML MDRD: 67 ML/MIN/1.73SQ M
GLUCOSE P FAST SERPL-MCNC: 96 MG/DL (ref 65–99)
HCT VFR BLD AUTO: 47.4 % (ref 34.8–46.1)
HDLC SERPL-MCNC: 66 MG/DL
HGB BLD-MCNC: 15 G/DL (ref 11.5–15.4)
IMM GRANULOCYTES # BLD AUTO: 0.04 THOUSAND/UL (ref 0–0.2)
IMM GRANULOCYTES NFR BLD AUTO: 1 % (ref 0–2)
LDLC SERPL CALC-MCNC: 130 MG/DL (ref 0–100)
LYMPHOCYTES # BLD AUTO: 1.78 THOUSANDS/ΜL (ref 0.6–4.47)
LYMPHOCYTES NFR BLD AUTO: 24 % (ref 14–44)
MCH RBC QN AUTO: 29.2 PG (ref 26.8–34.3)
MCHC RBC AUTO-ENTMCNC: 31.6 G/DL (ref 31.4–37.4)
MCV RBC AUTO: 92 FL (ref 82–98)
MONOCYTES # BLD AUTO: 0.62 THOUSAND/ΜL (ref 0.17–1.22)
MONOCYTES NFR BLD AUTO: 8 % (ref 4–12)
NEUTROPHILS # BLD AUTO: 4.42 THOUSANDS/ΜL (ref 1.85–7.62)
NEUTS SEG NFR BLD AUTO: 59 % (ref 43–75)
NONHDLC SERPL-MCNC: 152 MG/DL
NRBC BLD AUTO-RTO: 0 /100 WBCS
PLATELET # BLD AUTO: 232 THOUSANDS/UL (ref 149–390)
PMV BLD AUTO: 10.6 FL (ref 8.9–12.7)
POTASSIUM SERPL-SCNC: 4.4 MMOL/L (ref 3.5–5.3)
PROT SERPL-MCNC: 6.8 G/DL (ref 6.4–8.2)
RBC # BLD AUTO: 5.14 MILLION/UL (ref 3.81–5.12)
SODIUM SERPL-SCNC: 140 MMOL/L (ref 136–145)
TRIGL SERPL-MCNC: 110 MG/DL
TSH SERPL DL<=0.05 MIU/L-ACNC: 1.83 UIU/ML (ref 0.36–3.74)
WBC # BLD AUTO: 7.48 THOUSAND/UL (ref 4.31–10.16)

## 2021-02-17 PROCEDURE — 36415 COLL VENOUS BLD VENIPUNCTURE: CPT

## 2021-02-17 PROCEDURE — 85025 COMPLETE CBC W/AUTO DIFF WBC: CPT

## 2021-02-17 PROCEDURE — 80053 COMPREHEN METABOLIC PANEL: CPT

## 2021-02-17 PROCEDURE — 80061 LIPID PANEL: CPT

## 2021-02-17 PROCEDURE — 84443 ASSAY THYROID STIM HORMONE: CPT

## 2021-02-17 NOTE — TELEPHONE ENCOUNTER
----- Message from Delia Garcia MD sent at 2/17/2021 12:46 PM EST -----  Urine culture is contaminated and does not show a UTI  The UA showed white blood cells     She can continue the antibiotic  till done

## 2021-02-17 NOTE — TELEPHONE ENCOUNTER
----- Message from Alexandr Faith MD sent at 2/17/2021 12:46 PM EST -----  Urine culture is contaminated and does not show a UTI  The UA showed white blood cells     She can continue the antibiotic  till done

## 2021-02-28 DIAGNOSIS — G20 PARKINSON'S DISEASE (HCC): ICD-10-CM

## 2021-02-28 DIAGNOSIS — E03.9 ACQUIRED HYPOTHYROIDISM: ICD-10-CM

## 2021-03-01 ENCOUNTER — TELEPHONE (OUTPATIENT)
Dept: INTERNAL MEDICINE CLINIC | Facility: CLINIC | Age: 76
End: 2021-03-01

## 2021-03-01 RX ORDER — LEVOTHYROXINE SODIUM 0.05 MG/1
TABLET ORAL
Qty: 90 TABLET | Refills: 0 | Status: SHIPPED | OUTPATIENT
Start: 2021-03-01 | End: 2021-07-15 | Stop reason: SDUPTHER

## 2021-03-01 RX ORDER — ROTIGOTINE 4 MG/24H
PATCH, EXTENDED RELEASE TRANSDERMAL
Qty: 30 PATCH | Refills: 3 | Status: SHIPPED | OUTPATIENT
Start: 2021-03-01 | End: 2021-07-15 | Stop reason: SDUPTHER

## 2021-03-09 ENCOUNTER — TELEPHONE (OUTPATIENT)
Dept: NEUROLOGY | Facility: CLINIC | Age: 76
End: 2021-03-09

## 2021-03-09 DIAGNOSIS — G20 PARKINSON'S DISEASE (HCC): Primary | ICD-10-CM

## 2021-03-09 NOTE — TELEPHONE ENCOUNTER
Patient calling to see if you would be agreeable to placing a new order for PT  Said that you offered it to her in the past but she did not follow through  Minerva Gonzales that she would like to go to [facility] [address]  Please place if agreeable  Zanoni: [phone]: Zacarias Lyon to leave a detailed message

## 2021-03-15 ENCOUNTER — TELEPHONE (OUTPATIENT)
Dept: NEUROLOGY | Facility: CLINIC | Age: 76
End: 2021-03-15

## 2021-03-15 NOTE — TELEPHONE ENCOUNTER
Patient calling in asking for advice  Informed her we are awaiting a response and due to not being in the office, she will not be able to respond today  She verbalizes understanding

## 2021-03-15 NOTE — TELEPHONE ENCOUNTER
Patient calling in, states she cannot hold her urine and is waking up wet  States she has no ability to hold her urine  States she has the sensation to go but this is sudden  Noticed while awake as well  States she followed up with PCP for symptoms of UTI and was prescribed cipro for about a week  She does not have burning or pain with urination  States she has been having freezing, is not able to walk  States she took advise from Dr Maricruz Zepeda and this has not helped  Freezing? Yes, started last month - intermittent  States she has been exercising a little but this is not helping completely  Shuffling? Yes - intermittent   Difficulty Walking? Yes, see above  Uses a walking stick for ambulation  Has a wheelchair as needed  Recent Falls? No, states she almost fell but "got it together"  Any extra movements? No  Any Hallucinations? No  What time of day are symptoms worse? No considerate to time, random  Thinks she may be better int he mornings   Current medications confirmed as:  Carbidopa-levodopa  mg, states these kind of vary depending on when she wakes up  Takes 1 5 tablets at 7-8 am, 11:30 am "then 3 hours after that, then again 3 more hours after that  Neupro 4 mg, applied between 8-10 pm  Exelon 2 5mg applied between 8-9 am  lexapro 20 mg daily    Ask how long after each dose do they feel that it "wears off"? States she does not notice what time this wears off  Notes she does feel if she does not take her meds for 4 hours though  Does patient have a DBS? No     Best call back [phone], okay with detailed message

## 2021-03-16 ENCOUNTER — OFFICE VISIT (OUTPATIENT)
Dept: INTERNAL MEDICINE CLINIC | Facility: CLINIC | Age: 76
End: 2021-03-16
Payer: COMMERCIAL

## 2021-03-16 ENCOUNTER — APPOINTMENT (OUTPATIENT)
Dept: LAB | Facility: CLINIC | Age: 76
End: 2021-03-16
Payer: COMMERCIAL

## 2021-03-16 VITALS
SYSTOLIC BLOOD PRESSURE: 110 MMHG | DIASTOLIC BLOOD PRESSURE: 80 MMHG | WEIGHT: 165 LBS | TEMPERATURE: 97 F | HEIGHT: 63 IN | HEART RATE: 70 BPM | BODY MASS INDEX: 29.23 KG/M2

## 2021-03-16 DIAGNOSIS — R39.9 UTI SYMPTOMS: Primary | ICD-10-CM

## 2021-03-16 LAB
BACTERIA UR QL AUTO: ABNORMAL /HPF
BILIRUB UR QL STRIP: NEGATIVE
CLARITY UR: ABNORMAL
COLOR UR: YELLOW
GLUCOSE UR STRIP-MCNC: NEGATIVE MG/DL
HGB UR QL STRIP.AUTO: NEGATIVE
KETONES UR STRIP-MCNC: NEGATIVE MG/DL
LEUKOCYTE ESTERASE UR QL STRIP: ABNORMAL
NITRITE UR QL STRIP: NEGATIVE
NON-SQ EPI CELLS URNS QL MICRO: ABNORMAL /HPF
PH UR STRIP.AUTO: 6 [PH]
PROT UR STRIP-MCNC: ABNORMAL MG/DL
RBC #/AREA URNS AUTO: ABNORMAL /HPF
SP GR UR STRIP.AUTO: 1.03 (ref 1–1.03)
UROBILINOGEN UR QL STRIP.AUTO: 0.2 E.U./DL
WBC #/AREA URNS AUTO: ABNORMAL /HPF

## 2021-03-16 PROCEDURE — 1160F RVW MEDS BY RX/DR IN RCRD: CPT | Performed by: NURSE PRACTITIONER

## 2021-03-16 PROCEDURE — 3008F BODY MASS INDEX DOCD: CPT | Performed by: NURSE PRACTITIONER

## 2021-03-16 PROCEDURE — 1036F TOBACCO NON-USER: CPT | Performed by: NURSE PRACTITIONER

## 2021-03-16 PROCEDURE — 99213 OFFICE O/P EST LOW 20 MIN: CPT | Performed by: NURSE PRACTITIONER

## 2021-03-16 PROCEDURE — 81001 URINALYSIS AUTO W/SCOPE: CPT | Performed by: NURSE PRACTITIONER

## 2021-03-16 RX ORDER — PHENAZOPYRIDINE HYDROCHLORIDE 95 MG/1
95 TABLET ORAL 3 TIMES DAILY PRN
Qty: 10 TABLET | Refills: 0 | Status: SHIPPED | OUTPATIENT
Start: 2021-03-16 | End: 2021-06-28 | Stop reason: SDUPTHER

## 2021-03-16 RX ORDER — NITROFURANTOIN 25; 75 MG/1; MG/1
100 CAPSULE ORAL 2 TIMES DAILY
Qty: 10 CAPSULE | Refills: 0 | Status: SHIPPED | OUTPATIENT
Start: 2021-03-16 | End: 2021-03-21

## 2021-03-16 NOTE — TELEPHONE ENCOUNTER
For the freezing, she can try and increase her Sinemet dose slightly  Have her take 2tabs in the AM, then 1 5tabs then 2tabs then 1 5tabs  Have her watch to see if she feels that the freezing is less after she takes the 2tabs versus the 1 5tabs  For the urination she can f/u with PCP and perhaps they can try a medication for urinary urgency or at least make sure that the UTI has been treated  Thanks!

## 2021-03-16 NOTE — PROGRESS NOTES
INTERNAL MEDICINE FOLLOW-UP OFFICE VISIT  [facility] - [facility] [address] [place] [address]    NAME: Shiv Cunningham  AGE: 77 y o  SEX: female    DATE OF ENCOUNTER: 3/16/2021   Assessment and Plan:   Patient to repeat UA  Will start pyridium and macrobid for symptoms of unresolved UTI  If no improvements will consider referral to urology  Problem List Items Addressed This Visit     None      Visit Diagnoses     UTI symptoms    -  Primary    Relevant Medications    phenazopyridine (PYRIDIUM) 95 MG tablet    nitrofurantoin (MACROBID) 100 mg capsule    Other Relevant Orders    UA w Reflex to Microscopic w Reflex to Culture -Lab Collect          Return if symptoms worsen or fail to improve, for Next scheduled follow up  Counseling:     · Medication Side Effects - Adverse side effects of medications were reviewed with the patient/guardian today: Yes  · Counseling was given regarding: Prognosis, Risks and benefits of tx options, Intructions for management, Patient and family education, Importance of tx compliance, Risk factor reductions and Impressions  · Barriers to treatment include: No identified barriers      Chief Complaint:     Chief Complaint   Patient presents with    uti symptoms     started a month ago         History of Present Illness:     Patient with continued complaints of a UTI  Was seen last month and UA was inconclusive for bacterial infection but patient was treated with cipro due to presentation of symptoms  States she began to feel better but over the past ten days is feeling worse again  She has a lot of low back pains  States when she has a uti she always feels it in her back  Also this causes an exacerbation of her fatigue  She denies burning with urination but she does have urgency and frequency  Urinary Tract Infection   This is a recurrent problem  The current episode started more than 1 month ago  The problem occurs every urination  The problem has been unchanged  The quality of the pain is described as aching  The pain is mild  There has been no fever  Associated symptoms include frequency and urgency  Pertinent negatives include no chills, discharge, flank pain, hematuria, hesitancy, nausea, sweats or vomiting  She has tried antibiotics for the symptoms  The treatment provided mild relief  The following portions of the patient's history were reviewed and updated as appropriate: allergies, current medications, past family history, past medical history, past social history, past surgical history and problem list      Review of Systems:     Review of Systems   Constitutional: Positive for fatigue  Negative for chills  Gastrointestinal: Negative for abdominal pain, constipation, diarrhea, nausea and vomiting  Genitourinary: Positive for frequency and urgency  Negative for dysuria, flank pain, hematuria and hesitancy  Musculoskeletal: Positive for back pain  Neurological: Negative for dizziness and weakness  Psychiatric/Behavioral: Negative for confusion  Problem List:     Patient Active Problem List   Diagnosis    Parkinson's disease ([facility] )    Scoliosis    Low back pain    Acquired hypothyroidism    Elevated antinuclear antibody (RAS) level    Family hx-breast malignancy    Mixed hyperlipidemia    Rosacea    Trochanteric bursitis    Urinary incontinence    Vitamin D deficiency    Irritable bowel syndrome with both constipation and diarrhea    REM behavioral disorder    Anxiety    Recurrent major depressive disorder, in partial remission ([facility] )    Overweight    Osteopenia of multiple sites        Objective:     /80 (BP Location: Left arm, Patient Position: Sitting) Comment: fsdf  Pulse 70   Temp (!) 97 °F (36 1 °C) (Tympanic) Comment: fews  Ht 5' 3" (1 6 m)   Wt 74 8 kg (165 lb)   BMI 29 23 kg/m²     Physical Exam  Vitals signs reviewed  Constitutional:       General: She is not in acute distress       Appearance: Normal appearance  She is not ill-appearing  HENT:      Head: Normocephalic and atraumatic  Cardiovascular:      Rate and Rhythm: Normal rate and regular rhythm  Heart sounds: Normal heart sounds, S1 normal and S2 normal    Pulmonary:      Effort: Pulmonary effort is normal  No accessory muscle usage  Breath sounds: Normal breath sounds  No wheezing  Abdominal:      General: Abdomen is flat  Bowel sounds are normal       Palpations: Abdomen is soft  Tenderness: There is no abdominal tenderness  Musculoskeletal:      Lumbar back: She exhibits tenderness  Skin:     General: Skin is warm and dry  Capillary Refill: Capillary refill takes less than 2 seconds  Findings: No rash  Neurological:      General: No focal deficit present  Mental Status: She is alert and oriented to person, place, and time  Motor: Motor function is intact  Psychiatric:         Attention and Perception: Attention and perception normal          Mood and Affect: Mood and affect normal          Speech: Speech normal          Behavior: Behavior normal  Behavior is cooperative  Thought Content: Thought content normal          Pertinent Laboratory/Diagnostic Studies:    Laboratory Results: I have personally reviewed the pertinent laboratory results/reports   Radiology/Other Diagnostic Testing Results: I have personally reviewed pertinent reports         Current Medications:     Current Outpatient Medications   Medication Sig Dispense Refill    carbidopa-levodopa (SINEMET)  mg per tablet TAKE 1 & 1/2 (ONE & ONE-HALF) TABLETS BY MOUTH 4 TIMES DAILY 540 tablet 0    Cholecalciferol (VITAMIN D) 2000 units tablet 1 daily      Cranberry 1000 MG CAPS Take 4,200 mg by mouth as needed       cyanocobalamin (VITAMIN B-12) 1,000 mcg tablet Take 1,000 mcg by mouth daily      escitalopram (LEXAPRO) 20 mg tablet Take 1 tablet (20 mg total) by mouth daily 90 tablet 3    Homeopathic Products (PROSACEA) GEL Apply topically as needed       levothyroxine 50 mcg tablet Take 1 tablet by mouth once daily 90 tablet 0    Multiple Vitamin (MULTIVITAMINS PO) daily      Neupro 4 MG/24HR APPLY 1 PATCH TOPICALLY ONCE DAILY 30 patch 3    rivastigmine (EXELON) 9 5 mg/24 hr TD 24 hr patch Place 1 patch on the skin daily 30 patch 3    aspirin (ECOTRIN LOW STRENGTH) 81 mg EC tablet Take 81 mg by mouth daily as needed       nitrofurantoin (MACROBID) 100 mg capsule Take 1 capsule (100 mg total) by mouth 2 (two) times a day for 5 days 10 capsule 0    phenazopyridine (PYRIDIUM) 95 MG tablet Take 1 tablet (95 mg total) by mouth 3 (three) times a day as needed for bladder spasms 10 tablet 0    polyethylene glycol (MIRALAX) 17 g packet Take 17 g by mouth as needed       Probiotic Product (ALIGN) CHEW Chew daily       Wheat Dextrin (BENEFIBER DRINK MIX PO) Take by mouth as needed        No current facility-administered medications for this visit  Patient Instructions     Can start taking pyridium three times daily for 2 days  Start Macrobid 2 times daily for 5 days  Restart taking probiotic supplement  Urinary Urgency and Frequency   WHAT YOU NEED TO KNOW:   What is urinary urgency and frequency? Urinary urgency and frequency is a condition that increases how strongly or how often you need to urinate  The condition may also be called urgency-frequency syndrome  Urinary urgency means you feel such a strong need to urinate that you have trouble waiting  You may also feel discomfort in your bladder  Urinary frequency means you need to urinate many times during the day  This may also be called increased daytime frequency  You may be woken from sleep by the need to urinate  Urgency and frequency often happen together, but you may only have one  What causes urinary urgency and frequency?    · A urinary tract injury or infection (UTI), or a chronic bladder infection    · Infection in your urethra, or urine leaking from your urethra    · A nerve problem, or radiation treatment for cancer    · A medical condition, such as bladder cancer, diabetes, or a stroke    · Anxiety    · In women, pregnancy, menopause, or a vaginal infection    · In men, prostate infections, swelling, or enlargement    How are urination problems diagnosed? Your healthcare provider will ask questions about your symptoms  The provider will check your pelvic area and abdomen for problems that may be causing your symptoms  Tell the provider about any medical conditions you have and the medicines you take  You may need any of the following:  · Blood and urine tests  may be done to look for signs of infection, or blood in your urine  Your blood glucose (sugar) level may also be tested  · An ultrasound  may be used to measure the amount of urine in your bladder after you urinate  · A cystoscopy  may show problems inside your bladder  The cystoscope is a long tube with a lens and a light on the end  · Urodynamic testing  may show how well your bladder works  How is urinary urgency and frequency treated? Treatment will depend on the type and cause of your urination problems  You may need any of the following:  · Medicines  may be given to relax your bladder and decrease urination  You may also need antibiotics if your symptoms are caused by a bacterial infection  · Sacral nerve stimulation  sends electrical signals to your sacral nerve through a small device implanted under your skin  Your sacral nerve controls your bladder, sphincter, and pelvic floor muscles  · Botox injections  into your bladder may help relax your bladder muscle to decrease urgency and frequency  · Surgery  may be done if all other treatments cannot help you control your bladder  What can I do to manage urinary urgency and frequency? · Keep a record of your urination patterns for a few days    Write down the number of times you urinate over 24 hours, the amount, and if you have urine leakage  Record how strong the urge to urinate was each time  Your healthcare provider may also want you to record the type and amount of liquids you drink  · Train your bladder  Go to the bathroom at set times, such as every 2 hours, even if you do not feel the urge to go  You can also try to hold your urine when you feel the urge to go  For example, hold your urine for 5 minutes when you feel the urge to go  As that becomes easier, hold your urine for 10 minutes  Work up to every 3 or 4 hours to help control your bladder  · Limit liquids as directed  Limit liquids to decrease the amount you urinate  Ask how much liquid to drink each day and which liquids are best for you  You may need to avoid drinking liquids several hours before you go to sleep  Your healthcare provider may also recommend that you limit caffeine and alcohol  · Do Kegel exercises often  Kegel exercises help strengthen your pelvic muscles and improve bladder control  These muscles help you stop urinating  Squeeze these muscles tightly for 5 seconds like you are trying to stop the flow of urine  Then relax for 5 seconds  Gradually work up to squeezing for 10 seconds  Do 3 sets of 15 repetitions a day, or as directed  · Exercise regularly and maintain a healthy weight  Ask your healthcare provider how much you should weigh and about the best exercise plan for you  Extra weight puts pressure on your bladder and may make your symptoms worse  Ask your provider to help you create a safe weight loss plan if you are overweight  When should I contact my healthcare provider? · Your urine is pink, or you notice blood in your urine  · You have pain with urination  · You continue to have symptoms even after you take your medicine  · You have new or worsening symptoms  · You have questions or concerns about your condition or care  CARE AGREEMENT:   You have the right to help plan your care   Learn about your health condition and how it may be treated  Discuss treatment options with your healthcare providers to decide what care you want to receive  You always have the right to refuse treatment  The above information is an  only  It is not intended as medical advice for individual conditions or treatments  Talk to your doctor, nurse or pharmacist before following any medical regimen to see if it is safe and effective for you  © Copyright [address] Information is for End User's use only and may not be sold, redistributed or otherwise used for commercial purposes   All illustrations and images included in CareNotes® are the copyrighted property of [employer]  or [address] [place] [address] [employer] [address]

## 2021-03-16 NOTE — TELEPHONE ENCOUNTER
Called and advised pt of all of the below  She verbalized clear understanding of all instructions  Pt saw Ayleen Kathleen today and ordered pyridium and macrobid

## 2021-03-16 NOTE — PATIENT INSTRUCTIONS
Can start taking pyridium three times daily for 2 days  Start Macrobid 2 times daily for 5 days  Restart taking probiotic supplement  Urinary Urgency and Frequency   WHAT YOU NEED TO KNOW:   What is urinary urgency and frequency? Urinary urgency and frequency is a condition that increases how strongly or how often you need to urinate  The condition may also be called urgency-frequency syndrome  Urinary urgency means you feel such a strong need to urinate that you have trouble waiting  You may also feel discomfort in your bladder  Urinary frequency means you need to urinate many times during the day  This may also be called increased daytime frequency  You may be woken from sleep by the need to urinate  Urgency and frequency often happen together, but you may only have one  What causes urinary urgency and frequency? · A urinary tract injury or infection (UTI), or a chronic bladder infection    · Infection in your urethra, or urine leaking from your urethra    · A nerve problem, or radiation treatment for cancer    · A medical condition, such as bladder cancer, diabetes, or a stroke    · Anxiety    · In women, pregnancy, menopause, or a vaginal infection    · In men, prostate infections, swelling, or enlargement    How are urination problems diagnosed? Your healthcare provider will ask questions about your symptoms  The provider will check your pelvic area and abdomen for problems that may be causing your symptoms  Tell the provider about any medical conditions you have and the medicines you take  You may need any of the following:  · Blood and urine tests  may be done to look for signs of infection, or blood in your urine  Your blood glucose (sugar) level may also be tested  · An ultrasound  may be used to measure the amount of urine in your bladder after you urinate  · A cystoscopy  may show problems inside your bladder  The cystoscope is a long tube with a lens and a light on the end  · Urodynamic testing  may show how well your bladder works  How is urinary urgency and frequency treated? Treatment will depend on the type and cause of your urination problems  You may need any of the following:  · Medicines  may be given to relax your bladder and decrease urination  You may also need antibiotics if your symptoms are caused by a bacterial infection  · Sacral nerve stimulation  sends electrical signals to your sacral nerve through a small device implanted under your skin  Your sacral nerve controls your bladder, sphincter, and pelvic floor muscles  · Botox injections  into your bladder may help relax your bladder muscle to decrease urgency and frequency  · Surgery  may be done if all other treatments cannot help you control your bladder  What can I do to manage urinary urgency and frequency? · Keep a record of your urination patterns for a few days  Write down the number of times you urinate over 24 hours, the amount, and if you have urine leakage  Record how strong the urge to urinate was each time  Your healthcare provider may also want you to record the type and amount of liquids you drink  · Train your bladder  Go to the bathroom at set times, such as every 2 hours, even if you do not feel the urge to go  You can also try to hold your urine when you feel the urge to go  For example, hold your urine for 5 minutes when you feel the urge to go  As that becomes easier, hold your urine for 10 minutes  Work up to every 3 or 4 hours to help control your bladder  · Limit liquids as directed  Limit liquids to decrease the amount you urinate  Ask how much liquid to drink each day and which liquids are best for you  You may need to avoid drinking liquids several hours before you go to sleep  Your healthcare provider may also recommend that you limit caffeine and alcohol  · Do Kegel exercises often    Kegel exercises help strengthen your pelvic muscles and improve bladder control  These muscles help you stop urinating  Squeeze these muscles tightly for 5 seconds like you are trying to stop the flow of urine  Then relax for 5 seconds  Gradually work up to squeezing for 10 seconds  Do 3 sets of 15 repetitions a day, or as directed  · Exercise regularly and maintain a healthy weight  Ask your healthcare provider how much you should weigh and about the best exercise plan for you  Extra weight puts pressure on your bladder and may make your symptoms worse  Ask your provider to help you create a safe weight loss plan if you are overweight  When should I contact my healthcare provider? · Your urine is pink, or you notice blood in your urine  · You have pain with urination  · You continue to have symptoms even after you take your medicine  · You have new or worsening symptoms  · You have questions or concerns about your condition or care  CARE AGREEMENT:   You have the right to help plan your care  Learn about your health condition and how it may be treated  Discuss treatment options with your healthcare providers to decide what care you want to receive  You always have the right to refuse treatment  The above information is an  only  It is not intended as medical advice for individual conditions or treatments  Talk to your doctor, nurse or pharmacist before following any medical regimen to see if it is safe and effective for you  © Copyright [address] Information is for End User's use only and may not be sold, redistributed or otherwise used for commercial purposes   All illustrations and images included in CareNotes® are the copyrighted property of [employer]  or [address] [place], [place] [address]

## 2021-03-17 ENCOUNTER — TELEPHONE (OUTPATIENT)
Dept: INTERNAL MEDICINE CLINIC | Facility: CLINIC | Age: 76
End: 2021-03-17

## 2021-03-17 NOTE — TELEPHONE ENCOUNTER
----- Message from Jackson Arriola, [address] sent at 3/17/2021  9:58 AM EDT -----  Please let patient know she does have UTI please take antibiotic as instructed until complete and increase fluid intake  Thanks

## 2021-03-17 NOTE — TELEPHONE ENCOUNTER
Left message on how to take medication  Take macrobid bid for 5 days  Pyridium is optional for bladder pains

## 2021-03-17 NOTE — TELEPHONE ENCOUNTER
Patient saw Lucia Tomlinson yesterday for a UTI  She needs a call back to explain how to take the medication

## 2021-04-23 ENCOUNTER — TELEPHONE (OUTPATIENT)
Dept: NEUROLOGY | Facility: CLINIC | Age: 76
End: 2021-04-23

## 2021-04-23 NOTE — TELEPHONE ENCOUNTER
Pt called and states that she saw Dr Clarke Vazquez on 4/9/21 at [facility]  At that time, she was doing fine  10 days ago, she was having a hard time walking  This morning she was able to walk to the kitchen but very slow, it took her forever  Freezing? Yes, comes and goes but worsening    Shuffling? Yes, comes and goes but worsening    Difficulty Walking? Yes, uses WC at times    Recent Falls? No    Any extra movements? No    Any Hallucinations? Yes, few months ago she was inside the house w/ her  and she thought there was other people inside her house  No other episode since  What time of day are symptoms worse? It varies  There are times that she walks normal but other times has episodes of freezing and shuffling  Current medications confirmed as:  Sinemet  mg 2 tabs qid at 0800-11:30 am, 2:30 pm-5 pm   Neupro 4 mg daily  Rivastigmine 9 5 mg patch     No missed dose     Ask how long after each dose do they feel that it "wears off"? It varies  Does patient have a DBS? No    Pt has upcoming appt w/ you on 6/15/21   Placed on cancellation list                  [phone] or [phone] ok to leave detailed message               [phone]

## 2021-04-27 ENCOUNTER — APPOINTMENT (OUTPATIENT)
Dept: LAB | Facility: CLINIC | Age: 76
End: 2021-04-27
Payer: COMMERCIAL

## 2021-04-27 ENCOUNTER — OFFICE VISIT (OUTPATIENT)
Dept: INTERNAL MEDICINE CLINIC | Facility: CLINIC | Age: 76
End: 2021-04-27
Payer: COMMERCIAL

## 2021-04-27 VITALS
HEIGHT: 63 IN | BODY MASS INDEX: 29.7 KG/M2 | HEART RATE: 81 BPM | TEMPERATURE: 98.1 F | DIASTOLIC BLOOD PRESSURE: 68 MMHG | OXYGEN SATURATION: 98 % | WEIGHT: 167.6 LBS | SYSTOLIC BLOOD PRESSURE: 112 MMHG

## 2021-04-27 DIAGNOSIS — N39.0 FREQUENT UTI: Primary | ICD-10-CM

## 2021-04-27 DIAGNOSIS — R39.9 UTI SYMPTOMS: ICD-10-CM

## 2021-04-27 DIAGNOSIS — G20 PARKINSON'S DISEASE (HCC): Primary | ICD-10-CM

## 2021-04-27 LAB
BACTERIA UR QL AUTO: ABNORMAL /HPF
BILIRUB UR QL STRIP: NEGATIVE
CLARITY UR: ABNORMAL
COLOR UR: YELLOW
GLUCOSE UR STRIP-MCNC: NEGATIVE MG/DL
HGB UR QL STRIP.AUTO: NEGATIVE
HYALINE CASTS #/AREA URNS LPF: ABNORMAL /LPF
KETONES UR STRIP-MCNC: NEGATIVE MG/DL
LEUKOCYTE ESTERASE UR QL STRIP: ABNORMAL
NITRITE UR QL STRIP: NEGATIVE
NON-SQ EPI CELLS URNS QL MICRO: ABNORMAL /HPF
PH UR STRIP.AUTO: 6.5 [PH]
PROT UR STRIP-MCNC: NEGATIVE MG/DL
RBC #/AREA URNS AUTO: ABNORMAL /HPF
SP GR UR STRIP.AUTO: 1.02 (ref 1–1.03)
UROBILINOGEN UR QL STRIP.AUTO: 0.2 E.U./DL
WBC #/AREA URNS AUTO: ABNORMAL /HPF

## 2021-04-27 PROCEDURE — 99213 OFFICE O/P EST LOW 20 MIN: CPT | Performed by: NURSE PRACTITIONER

## 2021-04-27 PROCEDURE — 81001 URINALYSIS AUTO W/SCOPE: CPT | Performed by: NURSE PRACTITIONER

## 2021-04-27 PROCEDURE — 1160F RVW MEDS BY RX/DR IN RCRD: CPT | Performed by: NURSE PRACTITIONER

## 2021-04-27 PROCEDURE — 3008F BODY MASS INDEX DOCD: CPT | Performed by: NURSE PRACTITIONER

## 2021-04-27 PROCEDURE — 1036F TOBACCO NON-USER: CPT | Performed by: NURSE PRACTITIONER

## 2021-04-27 RX ORDER — RIBOFLAVIN (VITAMIN B2) 100 MG
TABLET ORAL
COMMUNITY

## 2021-04-27 RX ORDER — CIPROFLOXACIN 500 MG/1
500 TABLET, FILM COATED ORAL EVERY 12 HOURS SCHEDULED
Qty: 14 TABLET | Refills: 0 | Status: SHIPPED | OUTPATIENT
Start: 2021-04-27 | End: 2021-05-04

## 2021-04-27 RX ORDER — DIPHENOXYLATE HYDROCHLORIDE AND ATROPINE SULFATE 2.5; .025 MG/1; MG/1
TABLET ORAL
COMMUNITY
End: 2022-02-08 | Stop reason: ALTCHOICE

## 2021-04-27 NOTE — TELEPHONE ENCOUNTER
Called and advised pt of the below  She verbalized clear understanding     Script mailed to the address on file per pt's request

## 2021-04-27 NOTE — TELEPHONE ENCOUNTER
It does appear that she was to try and increase her Sinemet at the last visit with Dr Paty Ontiveros  Did this help at all? We could also try and increase her rivastigmine dose further to see if there is any benefit in regards to her freezing  Unfortunately sometimes the freezing dose to improve or respond to increasing the Sinemet dose, if there was no benefit with the last increase then I do not feel increasing again already would be of great benefit  This may just be a part of the disease that we can not clearly make better with any of our medications  PT and exercises is alsoways a good idea if she has not had any recent PT  Thanks!

## 2021-04-27 NOTE — PROGRESS NOTES
INTERNAL MEDICINE FOLLOW-UP OFFICE VISIT  [facility] - [facility]    NAME: Mellissa Patient  AGE: 77 y o  SEX: female    DATE OF ENCOUNTER: 4/27/2021   Assessment and Plan:   Patient with complaints similar to her previous UTI's  Recent urinalysis showed innumerable bacteria, WBC/leukocytes but no nitrites  Will repeat UA today  Start Cipro bid for 7 days  Referral placed to urology for frequent UTI's  Problem List Items Addressed This Visit     None      Visit Diagnoses     Frequent UTI    -  Primary    Relevant Medications    ciprofloxacin (CIPRO) 500 mg tablet    Other Relevant Orders    Ambulatory referral to Urology    UTI symptoms        Relevant Medications    ciprofloxacin (CIPRO) 500 mg tablet    Other Relevant Orders    UA w Reflex to Microscopic w Reflex to Culture -Lab Collect          Return if symptoms worsen or fail to improve, for Next scheduled follow up  Counseling:     · Medication Side Effects - Adverse side effects of medications were reviewed with the patient/guardian today: Yes  · Counseling was given regarding: Prognosis, Risks and benefits of tx options, Intructions for management, Patient and family education, Importance of tx compliance, Risk factor reductions and Impressions  · Barriers to treatment include: No identified barriers      Chief Complaint:     Chief Complaint   Patient presents with    Back Pain     lumbar        History of Present Illness:     Pain in low back started a few weeks ago and has been getting worse  This pain normally occurs when she has a UTI  Patient states this feels similar to her UTI pains in the past  Patient states she gets intermittent fevers or chills  She drinks 1-2 glasses of water a day and a glass of cranberry juice  States she used to drink a lot of soda but has cut back on this   States she was told the UTI's are due to her parkinson's but she states she suffered from frequent UTI's prior to her parkinson's onset  Has never seen a urologist      Urinary Tract Infection   This is a new problem  The current episode started 1 to 4 weeks ago  The problem has been gradually worsening  The quality of the pain is described as burning  The pain is moderate  Associated symptoms include chills, flank pain, frequency and urgency  Pertinent negatives include no discharge, hematuria, hesitancy, nausea, possible pregnancy, sweats or vomiting  Treatments tried: ASA, heating pad  The treatment provided no relief  The following portions of the patient's history were reviewed and updated as appropriate: allergies, current medications, past family history, past medical history, past social history, past surgical history and problem list      Review of Systems:     Review of Systems   Constitutional: Positive for chills  Gastrointestinal: Negative for nausea and vomiting  Genitourinary: Positive for flank pain, frequency and urgency  Negative for hematuria and hesitancy  Problem List:     Patient Active Problem List   Diagnosis    Parkinson's disease ([facility] )    Scoliosis    Low back pain    Acquired hypothyroidism    Elevated antinuclear antibody (RAS) level    Family hx-breast malignancy    Mixed hyperlipidemia    Rosacea    Trochanteric bursitis    Urinary incontinence    Vitamin D deficiency    Irritable bowel syndrome with both constipation and diarrhea    REM behavioral disorder    Anxiety    Recurrent major depressive disorder, in partial remission ([facility] )    Overweight    Osteopenia of multiple sites        Objective:     /68   Pulse 81   Temp 98 1 °F (36 7 °C) (Tympanic)   Ht 5' 3" (1 6 m)   Wt 76 kg (167 lb 9 6 oz)   SpO2 98%   BMI 29 69 kg/m²     Physical Exam  Vitals signs reviewed  Constitutional:       General: She is not in acute distress  Appearance: Normal appearance  She is not ill-appearing  HENT:      Head: Normocephalic and atraumatic     Cardiovascular: Rate and Rhythm: Normal rate and regular rhythm  Heart sounds: Normal heart sounds, S1 normal and S2 normal    Pulmonary:      Effort: Pulmonary effort is normal  No accessory muscle usage  Breath sounds: Normal breath sounds  No wheezing  Musculoskeletal:        Back:       Comments: No flank tenderness  Skin:     General: Skin is warm and dry  Capillary Refill: Capillary refill takes less than 2 seconds  Findings: No rash  Neurological:      General: No focal deficit present  Mental Status: She is alert and oriented to person, place, and time  Motor: Motor function is intact  Psychiatric:         Attention and Perception: Attention and perception normal          Mood and Affect: Mood and affect normal          Speech: Speech normal          Behavior: Behavior normal  Behavior is cooperative  Thought Content: Thought content normal          Pertinent Laboratory/Diagnostic Studies:    Laboratory Results: I have personally reviewed the pertinent laboratory results/reports   Radiology/Other Diagnostic Testing Results: I have personally reviewed pertinent reports         Current Medications:     Current Outpatient Medications   Medication Sig Dispense Refill    aspirin (ECOTRIN LOW STRENGTH) 81 mg EC tablet Take 81 mg by mouth daily as needed       carbidopa-levodopa (SINEMET)  mg per tablet TAKE 1 & 1/2 (ONE & ONE-HALF) TABLETS BY MOUTH 4 TIMES DAILY (Patient taking differently: Take 2 tablets by mouth 4 (four) times a day TAKE 1 & 1/2 (ONE & ONE-HALF) TABLETS BY MOUTH 4 TIMES DAILY) 540 tablet 0    carbidopa-levodopa (SINEMET)  mg per tablet Take 2 tablets by mouth 4 (four) times a day      Cholecalciferol (VITAMIN D) 2000 units tablet 1 daily      Cranberry 1000 MG CAPS Take 4,200 mg by mouth as needed       cyanocobalamin (VITAMIN B-12) 1,000 mcg tablet Take 1,000 mcg by mouth daily      escitalopram (LEXAPRO) 20 mg tablet Take 1 tablet (20 mg total) by mouth daily 90 tablet 3    Homeopathic Products (PROSACEA) GEL Apply topically as needed       levothyroxine 50 mcg tablet Take 1 tablet by mouth once daily 90 tablet 0    Multiple Vitamin (MULTIVITAMINS PO) daily      Neupro 4 MG/24HR APPLY 1 PATCH TOPICALLY ONCE DAILY 30 patch 3    Probiotic Product (ALIGN) CHEW Chew daily       rivastigmine (EXELON) 9 5 mg/24 hr TD 24 hr patch Place 1 patch on the skin daily 30 patch 3    Ascorbic Acid (vitamin C) 100 MG tablet Take by mouth      Cholecalciferol 25 MCG (1000 UT) capsule Take by mouth      ciprofloxacin (CIPRO) 500 mg tablet Take 1 tablet (500 mg total) by mouth every 12 (twelve) hours for 7 days 14 tablet 0    CRANBERRY PO Take by mouth      cyanocobalamin (VITAMIN B-12) 100 MCG tablet Take by mouth      multivitamin (THERAGRAN) TABS daily      phenazopyridine (PYRIDIUM) 95 MG tablet Take 1 tablet (95 mg total) by mouth 3 (three) times a day as needed for bladder spasms 10 tablet 0     No current facility-administered medications for this visit  Patient Instructions     Urinary Urgency and Frequency   WHAT YOU NEED TO KNOW:   What is urinary urgency and frequency? Urinary urgency and frequency is a condition that increases how strongly or how often you need to urinate  The condition may also be called urgency-frequency syndrome  Urinary urgency means you feel such a strong need to urinate that you have trouble waiting  You may also feel discomfort in your bladder  Urinary frequency means you need to urinate many times during the day  This may also be called increased daytime frequency  You may be woken from sleep by the need to urinate  Urgency and frequency often happen together, but you may only have one  What causes urinary urgency and frequency?    · A urinary tract injury or infection (UTI), or a chronic bladder infection    · Infection in your urethra, or urine leaking from your urethra    · A nerve problem, or radiation treatment for cancer    · A medical condition, such as bladder cancer, diabetes, or a stroke    · Anxiety    · In women, pregnancy, menopause, or a vaginal infection    · In men, prostate infections, swelling, or enlargement    How are urination problems diagnosed? Your healthcare provider will ask questions about your symptoms  The provider will check your pelvic area and abdomen for problems that may be causing your symptoms  Tell the provider about any medical conditions you have and the medicines you take  You may need any of the following:  · Blood and urine tests  may be done to look for signs of infection, or blood in your urine  Your blood glucose (sugar) level may also be tested  · An ultrasound  may be used to measure the amount of urine in your bladder after you urinate  · A cystoscopy  may show problems inside your bladder  The cystoscope is a long tube with a lens and a light on the end  · Urodynamic testing  may show how well your bladder works  How is urinary urgency and frequency treated? Treatment will depend on the type and cause of your urination problems  You may need any of the following:  · Medicines  may be given to relax your bladder and decrease urination  You may also need antibiotics if your symptoms are caused by a bacterial infection  · Sacral nerve stimulation  sends electrical signals to your sacral nerve through a small device implanted under your skin  Your sacral nerve controls your bladder, sphincter, and pelvic floor muscles  · Botox injections  into your bladder may help relax your bladder muscle to decrease urgency and frequency  · Surgery  may be done if all other treatments cannot help you control your bladder  What can I do to manage urinary urgency and frequency? · Keep a record of your urination patterns for a few days  Write down the number of times you urinate over 24 hours, the amount, and if you have urine leakage   Record how strong the urge to urinate was each time  Your healthcare provider may also want you to record the type and amount of liquids you drink  · Train your bladder  Go to the bathroom at set times, such as every 2 hours, even if you do not feel the urge to go  You can also try to hold your urine when you feel the urge to go  For example, hold your urine for 5 minutes when you feel the urge to go  As that becomes easier, hold your urine for 10 minutes  Work up to every 3 or 4 hours to help control your bladder  · Limit liquids as directed  Limit liquids to decrease the amount you urinate  Ask how much liquid to drink each day and which liquids are best for you  You may need to avoid drinking liquids several hours before you go to sleep  Your healthcare provider may also recommend that you limit caffeine and alcohol  · Do Kegel exercises often  Kegel exercises help strengthen your pelvic muscles and improve bladder control  These muscles help you stop urinating  Squeeze these muscles tightly for 5 seconds like you are trying to stop the flow of urine  Then relax for 5 seconds  Gradually work up to squeezing for 10 seconds  Do 3 sets of 15 repetitions a day, or as directed  · Exercise regularly and maintain a healthy weight  Ask your healthcare provider how much you should weigh and about the best exercise plan for you  Extra weight puts pressure on your bladder and may make your symptoms worse  Ask your provider to help you create a safe weight loss plan if you are overweight  When should I contact my healthcare provider? · Your urine is pink, or you notice blood in your urine  · You have pain with urination  · You continue to have symptoms even after you take your medicine  · You have new or worsening symptoms  · You have questions or concerns about your condition or care  CARE AGREEMENT:   You have the right to help plan your care  Learn about your health condition and how it may be treated  Discuss treatment options with your healthcare providers to decide what care you want to receive  You always have the right to refuse treatment  The above information is an  only  It is not intended as medical advice for individual conditions or treatments  Talk to your doctor, nurse or pharmacist before following any medical regimen to see if it is safe and effective for you  © Copyright [address] Information is for End User's use only and may not be sold, redistributed or otherwise used for commercial purposes   All illustrations and images included in CareNotes® are the copyrighted property of A D A M , Inc  or [address] [place] [address],[address]

## 2021-04-27 NOTE — TELEPHONE ENCOUNTER
Called [phone] and left a message on pt's answering machine for a call back    Called [phone] and advised of all of the below and verbalized understanding  Pt states increased sinemet dosage did not help at all  Declined to increase rivastigmine at this time  Agreeable to try PT  Requesting script be mailed to the address on file    Fyi: pt saw her pcp today  It looks like pt has UTI  She was started on cipro   UA w/ reflex still pending

## 2021-04-27 NOTE — PATIENT INSTRUCTIONS
Urinary Urgency and Frequency   WHAT YOU NEED TO KNOW:   What is urinary urgency and frequency? Urinary urgency and frequency is a condition that increases how strongly or how often you need to urinate  The condition may also be called urgency-frequency syndrome  Urinary urgency means you feel such a strong need to urinate that you have trouble waiting  You may also feel discomfort in your bladder  Urinary frequency means you need to urinate many times during the day  This may also be called increased daytime frequency  You may be woken from sleep by the need to urinate  Urgency and frequency often happen together, but you may only have one  What causes urinary urgency and frequency? · A urinary tract injury or infection (UTI), or a chronic bladder infection    · Infection in your urethra, or urine leaking from your urethra    · A nerve problem, or radiation treatment for cancer    · A medical condition, such as bladder cancer, diabetes, or a stroke    · Anxiety    · In women, pregnancy, menopause, or a vaginal infection    · In men, prostate infections, swelling, or enlargement    How are urination problems diagnosed? Your healthcare provider will ask questions about your symptoms  The provider will check your pelvic area and abdomen for problems that may be causing your symptoms  Tell the provider about any medical conditions you have and the medicines you take  You may need any of the following:  · Blood and urine tests  may be done to look for signs of infection, or blood in your urine  Your blood glucose (sugar) level may also be tested  · An ultrasound  may be used to measure the amount of urine in your bladder after you urinate  · A cystoscopy  may show problems inside your bladder  The cystoscope is a long tube with a lens and a light on the end  · Urodynamic testing  may show how well your bladder works  How is urinary urgency and frequency treated?   Treatment will depend on the type and cause of your urination problems  You may need any of the following:  · Medicines  may be given to relax your bladder and decrease urination  You may also need antibiotics if your symptoms are caused by a bacterial infection  · Sacral nerve stimulation  sends electrical signals to your sacral nerve through a small device implanted under your skin  Your sacral nerve controls your bladder, sphincter, and pelvic floor muscles  · Botox injections  into your bladder may help relax your bladder muscle to decrease urgency and frequency  · Surgery  may be done if all other treatments cannot help you control your bladder  What can I do to manage urinary urgency and frequency? · Keep a record of your urination patterns for a few days  Write down the number of times you urinate over 24 hours, the amount, and if you have urine leakage  Record how strong the urge to urinate was each time  Your healthcare provider may also want you to record the type and amount of liquids you drink  · Train your bladder  Go to the bathroom at set times, such as every 2 hours, even if you do not feel the urge to go  You can also try to hold your urine when you feel the urge to go  For example, hold your urine for 5 minutes when you feel the urge to go  As that becomes easier, hold your urine for 10 minutes  Work up to every 3 or 4 hours to help control your bladder  · Limit liquids as directed  Limit liquids to decrease the amount you urinate  Ask how much liquid to drink each day and which liquids are best for you  You may need to avoid drinking liquids several hours before you go to sleep  Your healthcare provider may also recommend that you limit caffeine and alcohol  · Do Kegel exercises often  Kegel exercises help strengthen your pelvic muscles and improve bladder control  These muscles help you stop urinating  Squeeze these muscles tightly for 5 seconds like you are trying to stop the flow of urine   Then relax for 5 seconds  Gradually work up to squeezing for 10 seconds  Do 3 sets of 15 repetitions a day, or as directed  · Exercise regularly and maintain a healthy weight  Ask your healthcare provider how much you should weigh and about the best exercise plan for you  Extra weight puts pressure on your bladder and may make your symptoms worse  Ask your provider to help you create a safe weight loss plan if you are overweight  When should I contact my healthcare provider? · Your urine is pink, or you notice blood in your urine  · You have pain with urination  · You continue to have symptoms even after you take your medicine  · You have new or worsening symptoms  · You have questions or concerns about your condition or care  CARE AGREEMENT:   You have the right to help plan your care  Learn about your health condition and how it may be treated  Discuss treatment options with your healthcare providers to decide what care you want to receive  You always have the right to refuse treatment  The above information is an  only  It is not intended as medical advice for individual conditions or treatments  Talk to your doctor, nurse or pharmacist before following any medical regimen to see if it is safe and effective for you  © Copyright [address] Information is for End User's use only and may not be sold, redistributed or otherwise used for commercial purposes   All illustrations and images included in CareNotes® are the copyrighted property of [employer]  or [address] [place] [address] [place]

## 2021-04-27 NOTE — TELEPHONE ENCOUNTER
Will place a referral for PT  Certainly an underlying infection such as a UTI can cause PD symptoms to worsen  May find that she improves after treatment for this  Thanks!

## 2021-05-05 DIAGNOSIS — G20 PARKINSON'S DISEASE (HCC): ICD-10-CM

## 2021-05-05 RX ORDER — RIVASTIGMINE 9.5 MG/24H
1 PATCH, EXTENDED RELEASE TRANSDERMAL DAILY
Qty: 30 PATCH | Refills: 6 | Status: SHIPPED | OUTPATIENT
Start: 2021-05-05 | End: 2021-12-01

## 2021-05-05 NOTE — TELEPHONE ENCOUNTER
Pt called requesting rivastigmine patch refill be sent to [facility]  Rx entered   Pls review and sign off    thanks

## 2021-05-27 DIAGNOSIS — G20 PARKINSON'S DISEASE (HCC): ICD-10-CM

## 2021-05-27 NOTE — TELEPHONE ENCOUNTER
Pt called reporting worsening Parkinson's symptoms  "I cannot move my feet"  States that he talked to Dr Elliott Tripathi about this last month and he recommended to do exercise w/PT at home  Someone comes to her house to assist her w/ this  "It is like yoga"  Pt states that it is not helping much as she cannot stand  Pt was tearful on the phone  Freezing? Yes-constant and worsening  Shuffling? Yes-constant    Difficulty Walking? Yes, mostly using a WC no  She tries to stay away using a WC as much as possible  At times, she uses a walker  "If I try to move, I am stuck"     Recent Falls? No recent fall  Last fall was last Nov during PT session    Any extra movements? No    Any Hallucinations? No      What time of day are symptoms worse?   "it happens everyday"  Worse in the afternoon     Current medications confirmed as:  Sinemet 25/100; 2 tabs 4 times per day   @ 8am, 11:30, 3pm, 6:30 pm  Neupro 4 mg daily  Rivastigmine 9 5 mg patch     Ask how long after each dose do they feel that it "wears off"? Unsure as it happens everyday  Does patient have a DBS? No    Pt states that she does not have UTI anymore but PD symptoms still worsening      Pt has upcoming appt w/ you on 6/15/21 at 0900      [phone] ok to leave detailed message

## 2021-06-01 NOTE — TELEPHONE ENCOUNTER
Spoke to pt  Made her aware of below  Increasing sinemet to 2 tabs did help with her symptoms a little bit  She is agreeable to either med change     States that she has a lot of stress in the life right now  Please advise

## 2021-06-01 NOTE — TELEPHONE ENCOUNTER
Did increasing the Sinemet from 1 5tabs to 2tabs help with any of her symptoms? Unfortunately sometimes the freezing does not respond to medications and it is not something that I can fix with a medication  If there was benefit with the higher dose of Sinemet, then we could try further adjustments  If not then perhaps we could try to increase her rivastigmine dose to 13 3mg patch to see if this helps with her gait or cognition  Otherwise exercises and therapy are certainly the best options to help her learn to adapt better  Thanks!

## 2021-06-02 DIAGNOSIS — G20 PARKINSON'S DISEASE (HCC): ICD-10-CM

## 2021-06-02 NOTE — TELEPHONE ENCOUNTER
Lets have her try and increase the 8am and 4pm Sinemet to 2 5tabs at this time  If still having issues after 2 weeks then we will increase her rivastigmine dose  Stress can certainly may PD symptoms worse so trying ways to cope with the stress such as breathing techniques can be helpful as well  Thanks!

## 2021-06-02 NOTE — TELEPHONE ENCOUNTER
Pt made aware of below and agreeable  She is requesting a new script sinemet    Please send as appropriate

## 2021-06-03 NOTE — TELEPHONE ENCOUNTER
Received voicemail from patient asking to clarify dose  Called patient and read instructions with her per Dayron Onofre, she verbalizes understanding  I let her know that Dayron Onofre sent a new script to her pharmacy  She verbalizes understanding  She denies having any further questions or concerns at this time

## 2021-06-04 NOTE — TELEPHONE ENCOUNTER
Received fax from pharm requesting new script with proper directions and frequency  rx called in for carb/levo 2 5tabs at 8 am, 2 tabs at 11:30 am, 2 5 tabs at 3pm and 2 tabs at 6:30pm     I entered new script to be called in for documentation purposes    Please sign off

## 2021-06-15 ENCOUNTER — OFFICE VISIT (OUTPATIENT)
Dept: NEUROLOGY | Facility: CLINIC | Age: 76
End: 2021-06-15
Payer: COMMERCIAL

## 2021-06-15 VITALS
SYSTOLIC BLOOD PRESSURE: 116 MMHG | WEIGHT: 155 LBS | BODY MASS INDEX: 27.46 KG/M2 | DIASTOLIC BLOOD PRESSURE: 56 MMHG | HEART RATE: 80 BPM

## 2021-06-15 DIAGNOSIS — G20 PARKINSON'S DISEASE (HCC): Primary | ICD-10-CM

## 2021-06-15 PROCEDURE — 99215 OFFICE O/P EST HI 40 MIN: CPT | Performed by: PHYSICIAN ASSISTANT

## 2021-06-15 NOTE — ASSESSMENT & PLAN NOTE
Patient states she has had some improvement of her Parkinson's symptoms with her recent increase in the Sinemet dose  She is also however having some increased dyskinesias since making this change  At this point her dyskinesias are not bothersome to her  No clear worsening of hallucinations however her  does state that she will occasionally ask if there are people in the bathroom at home  We had a long discussion in regards to the medications and treatment options for Parkinson's disease  At this time she feels that she is functioning well on her current dosing of medications  If however her dyskinesias or hallucinations worsen we did discuss the option of decreasing her Sinemet or Neupro versus starting a medication for her dyskinesias (Gocovri) or hallucinations (Seroquel or Nuplazid)  Per the  she continues to have some episodes of confusion  She also continues to have some issues with short-term memory loss  She scored a 25/30 on memory testing in the office today  She is on rivastigmine 9 5 mg daily and we discussed the option of increasing this dose however from a cognitive standpoint I do not see any clear indication  In the past there was some question as to whether not the rivastigmine helped with her freezing of gait however per the patient she feels that actually worsened with the increase  At this time will not make any changes to her rivastigmine and will have her remain on her current dose  Patient was encouraged to increase mind stimulating activities such as reading, crosswords, word searches, puzzles, Soduku, solitaire, coloring and other brain games  We also discussed the importance of staying physically active and eating a health diet such as the Mediterranean or MIND diet  She continues to have some issues with anxiety and dealing with her diagnosis    She is in the process of trying to find a new counselor which I feel would be very helpful for the patient

## 2021-06-15 NOTE — PATIENT INSTRUCTIONS
Continue current dose of medication  Continue with PT  Patient was encouraged to increase mind stimulating activities such as reading, crosswords, word searches, puzzles, Soduku, solitaire, coloring and other brain games  We also discussed the importance of staying physically active and eating a health diet such as the Mediterranean or MIND diet

## 2021-06-15 NOTE — PROGRESS NOTES
Patient ID: Pk Elder is a 77 y o  female  Assessment/Plan:    Parkinson's disease [facility])  Patient states she has had some improvement of her Parkinson's symptoms with her recent increase in the Sinemet dose  She is also however having some increased dyskinesias since making this change  At this point her dyskinesias are not bothersome to her  No clear worsening of hallucinations however her  does state that she will occasionally ask if there are people in the bathroom at home  We had a long discussion in regards to the medications and treatment options for Parkinson's disease  At this time she feels that she is functioning well on her current dosing of medications  If however her dyskinesias or hallucinations worsen we did discuss the option of decreasing her Sinemet or Neupro versus starting a medication for her dyskinesias (Gocovri) or hallucinations (Seroquel or Nuplazid)  Per the  she continues to have some episodes of confusion  She also continues to have some issues with short-term memory loss  She scored a 25/30 on memory testing in the office today  She is on rivastigmine 9 5 mg daily and we discussed the option of increasing this dose however from a cognitive standpoint I do not see any clear indication  In the past there was some question as to whether not the rivastigmine helped with her freezing of gait however per the patient she feels that actually worsened with the increase  At this time will not make any changes to her rivastigmine and will have her remain on her current dose  Patient was encouraged to increase mind stimulating activities such as reading, crosswords, word searches, puzzles, Soduku, solitaire, coloring and other brain games  We also discussed the importance of staying physically active and eating a health diet such as the Mediterranean or MIND diet  She continues to have some issues with anxiety and dealing with her diagnosis    She is in the process of trying to find a new counselor which I feel would be very helpful for the patient  Subjective:    Husam Che is a 77year-old right-handed woman with levodopa-responsive Parkinson's disease, symptom onset in 2011 with left foot tremor, now complicated by non-motor wearing-off, slight dyskinesias, FoG  In Fall of 2011 she noted some balance trouble and later developed micrographia  She was started on Azilect 1mg with little notable improvement  Neupro patch later added  Sinemet was added later and has been titrated up over time  At her last visit she was having improvement of freezing with increased rivstigmine  Her Sinemet timing was changed  INTERVAL HISTORY:  She also follows with the [facility] [address]  Last visit was 4/9/21  Per the note she was to increase her Sinemet dose  She called 4/23 with worsening symptoms  No improvement with the increased Sinemet dose  At that time she was also noted to have a UTI  She was sent for PT  Per the patient the Sinemet dose was further increased to by Dr Marline Johnson  She feels that she gets started easier in the AM when taking the 2 5tabs  She does not need the wheel chair and she is able to get her own breakfast    She is trying to exercise daily  She feels that her best time of the day is after breakfast    She feels at her worst at the end of the day  She is able to perform all of her ADLs on her own however her  will help her at times  She just started PT  No issues with swallowing  She has had 2 episodes of feeling like something was stuck however she feels this was more related to beng impatient  She has been noticing some recent head movements  These are not bothersome  He  has noticed some confusion versus hallucinations  She will ask her  if there is someone in the bathroom  No increase in this since being on the higher Sinemet dose         Current medications and timing:  - Sinemet 25/100; 2 5tabs at 8am, 2tabs at 11, 2 5tabs at 2pm, 2tabs at 5pm  - Neupro Patch 4mg evening    - Exelon patch 9 5mg   - Lexapro 20mg daily      Prior medication trials:  - Azilect 0 5mg in the evening (stopped by PCP, gait worsened without it)         I personally reviewed and updated the ROS  Total time spent today was 45 minutes  Greater than 50% of total time was spent with the patient and / or family counseling and / or coordinating plan of care  Objective:    Blood pressure 116/56, pulse 80, weight 70 3 kg (155 lb)  Physical Exam  Constitutional:       Appearance: Normal appearance  HENT:      Right Ear: Hearing normal       Left Ear: Hearing normal    Eyes:      General: Lids are normal       Extraocular Movements: Extraocular movements intact  Pupils: Pupils are equal, round, and reactive to light  Pulmonary:      Effort: Pulmonary effort is normal    Neurological:      Mental Status: She is alert  Deep Tendon Reflexes: Strength normal    Psychiatric:         Speech: Speech normal          Neurological Exam  Mental Status  Alert  Unable to copy figure  Clock drawing is normal  Speech is normal  Able to name objects and repeat  Able to perform serial calculations  MoCA 25/30 - 6/15/21  Cranial Nerves  CN III, IV, VI: Extraocular movements intact bilaterally  Normal lids and orbits bilaterally  Pupils equal round and reactive to light bilaterally  CN V:  Right: Facial sensation is normal   Left: Facial sensation is normal on the left  CN VIII:  Right: Hearing is normal   Left: Hearing is normal   CN XI: Shoulder shrug strength is normal     Motor   Strength is 5/5 throughout all four extremities  Sensory  Light touch is normal in upper and lower extremities  Coordination  Right: Finger-to-nose normal   Left: Finger-to-nose normal     Mild intermittent dyskinetic movements of the neck and legs  Worse when distracted       Gait    Able to stand from the wheelchair using hands  Slightly stooped posture  Decreased stride length bilaterally decreased arm swing however there were some dyskinetic arm movements noted with ambulation  No clear freezing noted on exam today  [place] [employer] UPDRS motor:                              Time since last dose:  2  6/15/21   Speech  0  0   Facial Expression  1     Rigidity - Neck  0  0   Rigidity - Upper Extremity (Right)  0  0   Rigidity - Upper Extremity (Left)   1  1   Rigidity - Lower Extremity (Right)  0  1   Rigidity - Lower Extremity (Left)   0  0   Finger Taps (Right)   0  0   Finger Taps (Left)   0  0   Hand Movement (Right)  0  0   Hand Movement (Left)   0  1   Pronation/Supination (Right)  0  1   Pronation/Supination (Left)   0  1   Toe Tapping (Right) 0  0   Toe Tapping (Left) 1  1   Leg Agility (Right)  0  0   Leg Agility (Left)   0  0   Arising from Chair      1   Gait      2   Freezing of Gait    0   Postural Stability         Posture    1   Global spontaneity of movement 1  1   Postural Tremor (Right) 0  0   Postural Tremor (Left) 0  0   Kinetic Tremor (Right)  0  0   Kinetic Tremor (Left)  0  0   Rest tremor amplitude RUE 0  0   Rest tremor amplitude LUE 0  0   Rest tremor amplitude RLE 0  0   Reset tremor amplitude LLE 0  0   Lip/Jaw Tremor  0     Consistency of tremor 0  0   Motor Exam Total:              ROS:    Review of Systems   Constitutional: Negative  Negative for appetite change and fever  HENT: Negative  Negative for hearing loss, tinnitus, trouble swallowing and voice change  Eyes: Negative  Negative for photophobia and pain  Respiratory: Negative  Negative for shortness of breath  Cardiovascular: Negative  Negative for palpitations  Gastrointestinal: Negative  Negative for nausea and vomiting  Endocrine: Negative  Negative for cold intolerance  Genitourinary: Negative  Negative for dysuria, frequency and urgency  Musculoskeletal: Positive for gait problem (Trouble starting to walk and also is freezing)  Negative for myalgias and neck pain  Skin: Negative  Negative for rash  Allergic/Immunologic: Negative  Neurological: Positive for tremors (Left foot)  Negative for dizziness, seizures, syncope, facial asymmetry, speech difficulty, weakness, light-headedness, numbness and headaches  Hematological: Negative  Does not bruise/bleed easily  Psychiatric/Behavioral: Negative for confusion, hallucinations and sleep disturbance  Short term memory     All other systems reviewed and are negative

## 2021-06-28 ENCOUNTER — APPOINTMENT (OUTPATIENT)
Dept: LAB | Facility: CLINIC | Age: 76
End: 2021-06-28
Payer: COMMERCIAL

## 2021-06-28 ENCOUNTER — OFFICE VISIT (OUTPATIENT)
Dept: INTERNAL MEDICINE CLINIC | Facility: CLINIC | Age: 76
End: 2021-06-28
Payer: COMMERCIAL

## 2021-06-28 VITALS
HEIGHT: 63 IN | SYSTOLIC BLOOD PRESSURE: 126 MMHG | DIASTOLIC BLOOD PRESSURE: 80 MMHG | OXYGEN SATURATION: 96 % | TEMPERATURE: 97 F | HEART RATE: 81 BPM | BODY MASS INDEX: 27.46 KG/M2

## 2021-06-28 DIAGNOSIS — Z12.31 SCREENING MAMMOGRAM, ENCOUNTER FOR: ICD-10-CM

## 2021-06-28 DIAGNOSIS — R39.9 UTI SYMPTOMS: Primary | ICD-10-CM

## 2021-06-28 PROCEDURE — 81001 URINALYSIS AUTO W/SCOPE: CPT | Performed by: NURSE PRACTITIONER

## 2021-06-28 PROCEDURE — 99213 OFFICE O/P EST LOW 20 MIN: CPT | Performed by: NURSE PRACTITIONER

## 2021-06-28 RX ORDER — CEPHALEXIN 500 MG/1
500 CAPSULE ORAL EVERY 8 HOURS SCHEDULED
Qty: 30 CAPSULE | Refills: 0 | Status: SHIPPED | OUTPATIENT
Start: 2021-06-28 | End: 2021-07-08

## 2021-06-28 RX ORDER — PHENAZOPYRIDINE HYDROCHLORIDE 95 MG/1
95 TABLET ORAL 3 TIMES DAILY PRN
Qty: 10 TABLET | Refills: 0 | Status: SHIPPED | OUTPATIENT
Start: 2021-06-28 | End: 2022-02-08 | Stop reason: ALTCHOICE

## 2021-06-28 NOTE — PATIENT INSTRUCTIONS
Urinary Urgency and Frequency   WHAT YOU NEED TO KNOW:   What is urinary urgency and frequency? Urinary urgency and frequency is a condition that increases how strongly or how often you need to urinate  The condition may also be called urgency-frequency syndrome  Urinary urgency means you feel such a strong need to urinate that you have trouble waiting  You may also feel discomfort in your bladder  Urinary frequency means you need to urinate many times during the day  This may also be called increased daytime frequency  You may be woken from sleep by the need to urinate  Urgency and frequency often happen together, but you may only have one  What causes urinary urgency and frequency? · A urinary tract injury or infection (UTI), or a chronic bladder infection    · Infection in your urethra, or urine leaking from your urethra    · A nerve problem, or radiation treatment for cancer    · A medical condition, such as bladder cancer, diabetes, or a stroke    · Anxiety    · In women, pregnancy, menopause, or a vaginal infection    · In men, prostate infections, swelling, or enlargement    How are urination problems diagnosed? Your healthcare provider will ask questions about your symptoms  The provider will check your pelvic area and abdomen for problems that may be causing your symptoms  Tell the provider about any medical conditions you have and the medicines you take  You may need any of the following:  · Blood and urine tests  may be done to look for signs of infection, or blood in your urine  Your blood glucose (sugar) level may also be tested  · An ultrasound  may be used to measure the amount of urine in your bladder after you urinate  · A cystoscopy  may show problems inside your bladder  The cystoscope is a long tube with a lens and a light on the end  · Urodynamic testing  may show how well your bladder works  How is urinary urgency and frequency treated?   Treatment will depend on the type and cause of your urination problems  You may need any of the following:  · Medicines  may be given to relax your bladder and decrease urination  You may also need antibiotics if your symptoms are caused by a bacterial infection  · Sacral nerve stimulation  sends electrical signals to your sacral nerve through a small device implanted under your skin  Your sacral nerve controls your bladder, sphincter, and pelvic floor muscles  · Botox injections  into your bladder may help relax your bladder muscle to decrease urgency and frequency  · Surgery  may be done if all other treatments cannot help you control your bladder  What can I do to manage urinary urgency and frequency? · Keep a record of your urination patterns for a few days  Write down the number of times you urinate over 24 hours, the amount, and if you have urine leakage  Record how strong the urge to urinate was each time  Your healthcare provider may also want you to record the type and amount of liquids you drink  · Train your bladder  Go to the bathroom at set times, such as every 2 hours, even if you do not feel the urge to go  You can also try to hold your urine when you feel the urge to go  For example, hold your urine for 5 minutes when you feel the urge to go  As that becomes easier, hold your urine for 10 minutes  Work up to every 3 or 4 hours to help control your bladder  · Limit liquids as directed  Limit liquids to decrease the amount you urinate  Ask how much liquid to drink each day and which liquids are best for you  You may need to avoid drinking liquids several hours before you go to sleep  Your healthcare provider may also recommend that you limit caffeine and alcohol  · Do Kegel exercises often  Kegel exercises help strengthen your pelvic muscles and improve bladder control  These muscles help you stop urinating  Squeeze these muscles tightly for 5 seconds like you are trying to stop the flow of urine   Then relax for 5 seconds  Gradually work up to squeezing for 10 seconds  Do 3 sets of 15 repetitions a day, or as directed  · Exercise regularly and maintain a healthy weight  Ask your healthcare provider how much you should weigh and about the best exercise plan for you  Extra weight puts pressure on your bladder and may make your symptoms worse  Ask your provider to help you create a safe weight loss plan if you are overweight  When should I contact my healthcare provider? · Your urine is pink, or you notice blood in your urine  · You have pain with urination  · You continue to have symptoms even after you take your medicine  · You have new or worsening symptoms  · You have questions or concerns about your condition or care  CARE AGREEMENT:   You have the right to help plan your care  Learn about your health condition and how it may be treated  Discuss treatment options with your healthcare providers to decide what care you want to receive  You always have the right to refuse treatment  The above information is an  only  It is not intended as medical advice for individual conditions or treatments  Talk to your doctor, nurse or pharmacist before following any medical regimen to see if it is safe and effective for you  © Copyright [address] Information is for End User's use only and may not be sold, redistributed or otherwise used for commercial purposes   All illustrations and images included in CareNotes® are the copyrighted property of [employer]  or [address] [place] [address] [employer]

## 2021-06-28 NOTE — PROGRESS NOTES
INTERNAL MEDICINE FOLLOW-UP OFFICE VISIT  [facility] - [facility] [address] [place] [address]    NAME: Josiah Ponce  AGE: 77 y o  SEX: female    DATE OF ENCOUNTER: 6/28/2021   Assessment and Plan: Will send for UA today  Can start pyridium and keflex  Advised to maintain hydration  Follow up with urology as scheduled  mammo ordered due to new onset breast pains  Problem List Items Addressed This Visit     None      Visit Diagnoses     UTI symptoms    -  Primary    Relevant Medications    cephalexin (KEFLEX) 500 mg capsule    phenazopyridine (PYRIDIUM) 95 MG tablet    Other Relevant Orders    UA w Reflex to Microscopic w Reflex to Culture -Lab Collect    Screening mammogram, encounter for        Relevant Orders    Mammo screening bilateral w 3d & cad          Return if symptoms worsen or fail to improve, for Next scheduled follow up  Counseling:     · Medication Side Effects - Adverse side effects of medications were reviewed with the patient/guardian today: Yes  · Counseling was given regarding: Prognosis, Risks and benefits of tx options, Intructions for management, Patient and family education, Importance of tx compliance, Risk factor reductions and Impressions  · Barriers to treatment include: No identified barriers      Chief Complaint:     Chief Complaint   Patient presents with    UTI     started 2 weeks ago , tiredness / burning with urinating         History of Present Illness:     Patient with symptoms of UTI  States she has had burning and irritation for about 2 weeks now  She was seen in urgent care about one month ago and states she was treated with keflex for 10 days which helped, but then symptoms returned  States she feels very weak and drained  She is prone to frequent UTI's and has an appt with urology this week for a consult  Also has complaints of breast pains   States she did "flop" on the bed while playing with grand children and had a bruise by the right axilla/breast  This has gone away but still gets achy pains  She is requesting a mammogram screening  The following portions of the patient's history were reviewed and updated as appropriate: allergies, current medications, past family history, past medical history, past social history, past surgical history and problem list      Review of Systems:     Review of Systems   Constitutional: Positive for fatigue  Negative for chills and fever  Genitourinary: Positive for dysuria and frequency  Negative for decreased urine volume, difficulty urinating, flank pain and hematuria  Musculoskeletal: Negative for back pain  Neurological: Positive for weakness  Negative for dizziness  Psychiatric/Behavioral: Negative for sleep disturbance  The patient is not nervous/anxious  Problem List:     Patient Active Problem List   Diagnosis    Parkinson's disease ([facility] [address] )    Scoliosis    Low back pain    Acquired hypothyroidism    Elevated antinuclear antibody (RAS) level    Family hx-breast malignancy    Mixed hyperlipidemia    Rosacea    Trochanteric bursitis    Urinary incontinence    Vitamin D deficiency    Irritable bowel syndrome with both constipation and diarrhea    REM behavioral disorder    Anxiety    Recurrent major depressive disorder, in partial remission ([facility] [address] )    Overweight    Osteopenia of multiple sites        Objective:     /80 (BP Location: Left arm, Patient Position: Sitting) Comment: fs  Pulse 81   Temp (!) 97 °F (36 1 °C) (Tympanic) Comment: gdd  Ht 5' 3" (1 6 m)   SpO2 96%   BMI 27 46 kg/m²     Physical Exam  Vitals reviewed  Constitutional:       General: She is not in acute distress  Appearance: Normal appearance  She is not ill-appearing  HENT:      Head: Normocephalic and atraumatic  Cardiovascular:      Rate and Rhythm: Normal rate and regular rhythm        Heart sounds: Normal heart sounds, S1 normal and S2 normal    Pulmonary:      Effort: Pulmonary effort is normal  No accessory muscle usage  Breath sounds: Normal breath sounds  No wheezing  Abdominal:      General: Abdomen is flat  Bowel sounds are normal       Palpations: Abdomen is soft  Tenderness: There is no abdominal tenderness  Musculoskeletal:        Back:    Skin:     General: Skin is warm and dry  Capillary Refill: Capillary refill takes less than 2 seconds  Findings: No rash  Neurological:      General: No focal deficit present  Mental Status: She is alert and oriented to person, place, and time  Motor: Motor function is intact  Psychiatric:         Attention and Perception: Attention and perception normal          Mood and Affect: Mood and affect normal          Speech: Speech normal          Behavior: Behavior normal  Behavior is cooperative  Thought Content: Thought content normal          Pertinent Laboratory/Diagnostic Studies:    Laboratory Results: I have personally reviewed the pertinent laboratory results/reports   Radiology/Other Diagnostic Testing Results: I have personally reviewed pertinent reports         Current Medications:     Current Outpatient Medications   Medication Sig Dispense Refill    Ascorbic Acid (vitamin C) 100 MG tablet Take by mouth      aspirin (ECOTRIN LOW STRENGTH) 81 mg EC tablet Take 81 mg by mouth daily as needed       carbidopa-levodopa (SINEMET)  mg per tablet Take 2 5tabs at 8 am, 2 tabs at 11:30 am, 2 5 tabs at 3pm and 2 tabs at 6:30pm  810 tablet 3    Cholecalciferol (VITAMIN D) 2000 units tablet 1 daily      Cholecalciferol 25 MCG (1000 UT) capsule Take by mouth      Cranberry 1000 MG CAPS Take 4,200 mg by mouth as needed       CRANBERRY PO Take by mouth      cyanocobalamin (VITAMIN B-12) 1,000 mcg tablet Take 1,000 mcg by mouth daily      cyanocobalamin (VITAMIN B-12) 100 MCG tablet Take by mouth      escitalopram (LEXAPRO) 20 mg tablet Take 1 tablet (20 mg total) by mouth daily 90 tablet 3    Homeopathic Products (PROSACEA) GEL Apply topically as needed       levothyroxine 50 mcg tablet Take 1 tablet by mouth once daily 90 tablet 0    Multiple Vitamin (MULTIVITAMINS PO) daily      multivitamin (THERAGRAN) TABS daily      Neupro 4 MG/24HR APPLY 1 PATCH TOPICALLY ONCE DAILY 30 patch 3    phenazopyridine (PYRIDIUM) 95 MG tablet Take 1 tablet (95 mg total) by mouth 3 (three) times a day as needed for bladder spasms 10 tablet 0    Probiotic Product (ALIGN) CHEW Chew daily       rivastigmine (EXELON) 9 5 mg/24 hr TD 24 hr patch Place 1 patch on the skin daily 30 patch 6    cephalexin (KEFLEX) 500 mg capsule Take 1 capsule (500 mg total) by mouth every 8 (eight) hours for 10 days 30 capsule 0     No current facility-administered medications for this visit  Patient Instructions     Urinary Urgency and Frequency   WHAT YOU NEED TO KNOW:   What is urinary urgency and frequency? Urinary urgency and frequency is a condition that increases how strongly or how often you need to urinate  The condition may also be called urgency-frequency syndrome  Urinary urgency means you feel such a strong need to urinate that you have trouble waiting  You may also feel discomfort in your bladder  Urinary frequency means you need to urinate many times during the day  This may also be called increased daytime frequency  You may be woken from sleep by the need to urinate  Urgency and frequency often happen together, but you may only have one  What causes urinary urgency and frequency?    · A urinary tract injury or infection (UTI), or a chronic bladder infection    · Infection in your urethra, or urine leaking from your urethra    · A nerve problem, or radiation treatment for cancer    · A medical condition, such as bladder cancer, diabetes, or a stroke    · Anxiety    · In women, pregnancy, menopause, or a vaginal infection    · In men, prostate infections, swelling, or enlargement    How are urination problems diagnosed? Your healthcare provider will ask questions about your symptoms  The provider will check your pelvic area and abdomen for problems that may be causing your symptoms  Tell the provider about any medical conditions you have and the medicines you take  You may need any of the following:  · Blood and urine tests  may be done to look for signs of infection, or blood in your urine  Your blood glucose (sugar) level may also be tested  · An ultrasound  may be used to measure the amount of urine in your bladder after you urinate  · A cystoscopy  may show problems inside your bladder  The cystoscope is a long tube with a lens and a light on the end  · Urodynamic testing  may show how well your bladder works  How is urinary urgency and frequency treated? Treatment will depend on the type and cause of your urination problems  You may need any of the following:  · Medicines  may be given to relax your bladder and decrease urination  You may also need antibiotics if your symptoms are caused by a bacterial infection  · Sacral nerve stimulation  sends electrical signals to your sacral nerve through a small device implanted under your skin  Your sacral nerve controls your bladder, sphincter, and pelvic floor muscles  · Botox injections  into your bladder may help relax your bladder muscle to decrease urgency and frequency  · Surgery  may be done if all other treatments cannot help you control your bladder  What can I do to manage urinary urgency and frequency? · Keep a record of your urination patterns for a few days  Write down the number of times you urinate over 24 hours, the amount, and if you have urine leakage  Record how strong the urge to urinate was each time  Your healthcare provider may also want you to record the type and amount of liquids you drink  · Train your bladder  Go to the bathroom at set times, such as every 2 hours, even if you do not feel the urge to go   You can also try to hold your urine when you feel the urge to go  For example, hold your urine for 5 minutes when you feel the urge to go  As that becomes easier, hold your urine for 10 minutes  Work up to every 3 or 4 hours to help control your bladder  · Limit liquids as directed  Limit liquids to decrease the amount you urinate  Ask how much liquid to drink each day and which liquids are best for you  You may need to avoid drinking liquids several hours before you go to sleep  Your healthcare provider may also recommend that you limit caffeine and alcohol  · Do Kegel exercises often  Kegel exercises help strengthen your pelvic muscles and improve bladder control  These muscles help you stop urinating  Squeeze these muscles tightly for 5 seconds like you are trying to stop the flow of urine  Then relax for 5 seconds  Gradually work up to squeezing for 10 seconds  Do 3 sets of 15 repetitions a day, or as directed  · Exercise regularly and maintain a healthy weight  Ask your healthcare provider how much you should weigh and about the best exercise plan for you  Extra weight puts pressure on your bladder and may make your symptoms worse  Ask your provider to help you create a safe weight loss plan if you are overweight  When should I contact my healthcare provider? · Your urine is pink, or you notice blood in your urine  · You have pain with urination  · You continue to have symptoms even after you take your medicine  · You have new or worsening symptoms  · You have questions or concerns about your condition or care  CARE AGREEMENT:   You have the right to help plan your care  Learn about your health condition and how it may be treated  Discuss treatment options with your healthcare providers to decide what care you want to receive  You always have the right to refuse treatment  The above information is an  only   It is not intended as medical advice for individual conditions or treatments  Talk to your doctor, nurse or pharmacist before following any medical regimen to see if it is safe and effective for you  © Copyright [address] Information is for End User's use only and may not be sold, redistributed or otherwise used for commercial purposes   All illustrations and images included in CareNotes® are the copyrighted property of [employer]iki CASILL[employer]  or [address] [place] [address], [address] [place] [address]

## 2021-06-29 ENCOUNTER — TELEPHONE (OUTPATIENT)
Dept: INTERNAL MEDICINE CLINIC | Facility: CLINIC | Age: 76
End: 2021-06-29

## 2021-06-29 LAB
BACTERIA UR QL AUTO: ABNORMAL /HPF
BILIRUB UR QL STRIP: NEGATIVE
CAOX CRY URNS QL MICRO: ABNORMAL /HPF
CLARITY UR: ABNORMAL
COLOR UR: YELLOW
GLUCOSE UR STRIP-MCNC: NEGATIVE MG/DL
HGB UR QL STRIP.AUTO: NEGATIVE
KETONES UR STRIP-MCNC: NEGATIVE MG/DL
LEUKOCYTE ESTERASE UR QL STRIP: ABNORMAL
NITRITE UR QL STRIP: NEGATIVE
NON-SQ EPI CELLS URNS QL MICRO: ABNORMAL /HPF
PH UR STRIP.AUTO: 5.5 [PH]
PROT UR STRIP-MCNC: NEGATIVE MG/DL
RBC #/AREA URNS AUTO: ABNORMAL /HPF
SP GR UR STRIP.AUTO: 1.02 (ref 1–1.03)
UROBILINOGEN UR QL STRIP.AUTO: 0.2 E.U./DL
WBC #/AREA URNS AUTO: ABNORMAL /HPF

## 2021-06-29 NOTE — TELEPHONE ENCOUNTER
----- Message from Kristi Craig Saranya  sent at 6/29/2021  7:57 AM EDT -----  Please let patient know urine is positive for some bacteria  She does have oxalate crystals which could mean her symptoms are coming more from kidney stones than the bladder itself  After consult with urology on Friday, may consider follow up with nephrology if needed  Thanks  IV

## 2021-07-02 ENCOUNTER — HOSPITAL ENCOUNTER (OUTPATIENT)
Dept: CT IMAGING | Facility: HOSPITAL | Age: 76
Discharge: HOME | End: 2021-07-02
Payer: COMMERCIAL

## 2021-07-02 ENCOUNTER — CONSULT (OUTPATIENT)
Dept: UROLOGY | Facility: CLINIC | Age: 76
End: 2021-07-02
Payer: COMMERCIAL

## 2021-07-02 VITALS
HEART RATE: 79 BPM | SYSTOLIC BLOOD PRESSURE: 124 MMHG | BODY MASS INDEX: 29.06 KG/M2 | DIASTOLIC BLOOD PRESSURE: 74 MMHG | HEIGHT: 63 IN | WEIGHT: 164 LBS

## 2021-07-02 DIAGNOSIS — N39.0 FREQUENT UTI: ICD-10-CM

## 2021-07-02 DIAGNOSIS — R10.9 FLANK PAIN: Primary | ICD-10-CM

## 2021-07-02 DIAGNOSIS — R10.9 FLANK PAIN: ICD-10-CM

## 2021-07-02 LAB — POST-VOID RESIDUAL VOLUME, ML POC: 0 ML

## 2021-07-02 PROCEDURE — 99213 OFFICE O/P EST LOW 20 MIN: CPT | Performed by: PHYSICIAN ASSISTANT

## 2021-07-02 PROCEDURE — 74176 CT ABD & PELVIS W/O CONTRAST: CPT

## 2021-07-02 PROCEDURE — 51798 US URINE CAPACITY MEASURE: CPT | Performed by: PHYSICIAN ASSISTANT

## 2021-07-02 PROCEDURE — G1004 CDSM NDSC: HCPCS

## 2021-07-02 NOTE — PROGRESS NOTES
Assessment/Plan:      Frequent UTI  Patient has had several UTIs based off of urine dip and microscopic  There is only 1 urine culture from February which   Showed mixed contaminants  Patient is currently in a wheelchair and is unable to provide urine sample today  Discuss conservative measures with patient including increasing fluid intake by 1 5 L as this can decrease patient's chance of UTIs by 48%,   Cranberry juice and cranberry juice supplementation along with vitamin-C  Also discussed proper hygiene as patient currently uses pads/ briefs  Discussed that she should changes regularly to stay dry   And along with additional hygiene recommendations  Discussed more advanced treatment such as suppressive antibiotics and hiprex  Patient daughter and  are currently agreeable to plan at this time  Will follow-up in 6 weeks for reassessment of UTIs symptoms  Will adjust based off of CT imaging  Flank pain    Patient currently reporting that she is experiencing flank pain along with nausea  She reports that this pain can be so bad that it wakes her from sleep  UA with micro showing calcium oxalate crystals  CT scan from 2018 did not reveal any note of kidney stones  Patient denies a history of kidney stones  Given patient's current symptomatology, physical exam findings along with recurrent UTIs will obtain a CT renal stone study for evaluation  For nephrolithiasis  Place this order as stat as given patient's recurrent UTIs with confusion, flank pain and nausea  As well as the upcoming weekend  will adjust treatment based off the results of CT scan  Problem List Items Addressed This Visit        Genitourinary    Frequent UTI     Patient has had several UTIs based off of urine dip and microscopic  There is only 1 urine culture from February which   Showed mixed contaminants      Patient is currently in a wheelchair and is unable to provide urine sample today  Discuss conservative measures with patient including increasing fluid intake by 1 5 L as this can decrease patient's chance of UTIs by 48%,   Cranberry juice and cranberry juice supplementation along with vitamin-C  Also discussed proper hygiene as patient currently uses pads/ briefs  Discussed that she should changes regularly to stay dry   And along with additional hygiene recommendations  Discussed more advanced treatment such as suppressive antibiotics and hiprex  Patient daughter and  are currently agreeable to plan at this time  Will follow-up in 6 weeks for reassessment of UTIs symptoms  Will adjust based off of CT imaging  Relevant Orders    POCT Measure PVR (Completed)    CT renal stone study abdomen pelvis wo contrast       Other    Flank pain - Primary       Patient currently reporting that she is experiencing flank pain along with nausea  She reports that this pain can be so bad that it wakes her from sleep  UA with micro showing calcium oxalate crystals  CT scan from 2018 did not reveal any note of kidney stones  Patient denies a history of kidney stones  Given patient's current symptomatology, physical exam findings along with recurrent UTIs will obtain a CT renal stone study for evaluation  For nephrolithiasis  Place this order as stat as given patient's recurrent UTIs with confusion, flank pain and nausea  As well as the upcoming weekend  will adjust treatment based off the results of CT scan  Relevant Orders    CT renal stone study abdomen pelvis wo contrast            Subjective:      Patient ID: Rubymoiz Garcia is a 77 y o  female  HPI   Patient is a 36-year-old female presents to our office to establish care with our  Practice for recurrent UTIs  Patient had in imaging from 2018 which showed some small lesions too small to actually characterize  No sign of stones hydronephrosis or obstructive uropathy  Patient had a most recent urine culture which showed mixed contaminants in February  Patient has not had any additional cultures as the order was reflex  To culture  Patient currently reporting that she is also experiencing right-sided flank pain associated with nausea and vomiting intermittently  She reports that this pain wakes her up in the middle of the night  She also has been getting these recurrent UTIs based off of urine dip  Patient has also been more confused and has been having hallucinations when she has UTI symptoms   Per daughter  Patient denies a prior history of kidney stones  Patient has a past medical history of  Hepatitis  Patient denies any constipation or difficulty with bowels  The following portions of the patient's history were reviewed and updated as appropriate:   She  has a past medical history of Anxiety, Disease of thyroid gland, Generalized anxiety disorder (4/23/2015), Hepatic disease, Infectious viral hepatitis, Liver disease, Osteoporosis, Overweight (BMI 25 0-29 9) (2/12/2020), Parkinson's disease ([facility] [address] ), Patient refuses to disclose advance directive information (5/23/2018), Rosacea, Senile osteoporosis (5/27/2003), and Shortness of breath    She   Patient Active Problem List    Diagnosis Date Noted    Frequent UTI 07/02/2021    Osteopenia of multiple sites 06/24/2020    Overweight 02/12/2020    Recurrent major depressive disorder, in partial remission ([address] ) 11/06/2019    Anxiety 02/11/2019    REM behavioral disorder 11/27/2018    Irritable bowel syndrome with both constipation and diarrhea 11/07/2018    Flank pain 08/29/2018    Trochanteric bursitis 08/14/2018    Low back pain 06/28/2018    Scoliosis 05/21/2018    Rosacea 01/19/2016    Mixed hyperlipidemia 10/24/2013    Elevated antinuclear antibody (RAS) level 06/06/2013    Parkinson's disease ([facility] [address] ) 09/05/2012    Vitamin D deficiency 06/20/2011    Family hx-breast malignancy 03/19/2008    Acquired hypothyroidism 06/01/2005    Urinary incontinence 01/19/2001     She  has a past surgical history that includes Hysterectomy; Foot surgery (Bilateral); Urethra surgery; and pr esophagogastroduodenoscopy transoral diagnostic (N/A, 9/5/2018)  Her family history includes Cavazos's esophagus in her brother; Cancer in her brother, father, and mother; Dementia in her mother; Esophageal cancer in her brother and family; Heart disease in her family; Lung cancer in her father and mother; Stroke in her family  She  reports that she has never smoked  She has never used smokeless tobacco  She reports previous alcohol use of about 1 0 standard drinks of alcohol per week  She reports that she does not use drugs    Current Outpatient Medications   Medication Sig Dispense Refill    Ascorbic Acid (vitamin C) 100 MG tablet Take by mouth      aspirin (ECOTRIN LOW STRENGTH) 81 mg EC tablet Take 81 mg by mouth daily as needed       carbidopa-levodopa (SINEMET)  mg per tablet Take 2 5tabs at 8 am, 2 tabs at 11:30 am, 2 5 tabs at 3pm and 2 tabs at 6:30pm  810 tablet 3    cephalexin (KEFLEX) 500 mg capsule Take 1 capsule (500 mg total) by mouth every 8 (eight) hours for 10 days 30 capsule 0    Cholecalciferol (VITAMIN D) 2000 units tablet 1 daily      Cholecalciferol 25 MCG (1000 UT) capsule Take by mouth      Cranberry 1000 MG CAPS Take 4,200 mg by mouth as needed       CRANBERRY PO Take by mouth      cyanocobalamin (VITAMIN B-12) 1,000 mcg tablet Take 1,000 mcg by mouth daily      cyanocobalamin (VITAMIN B-12) 100 MCG tablet Take by mouth      escitalopram (LEXAPRO) 20 mg tablet Take 1 tablet (20 mg total) by mouth daily 90 tablet 3    Homeopathic Products (PROSACEA) GEL Apply topically as needed       levothyroxine 50 mcg tablet Take 1 tablet by mouth once daily 90 tablet 0    Multiple Vitamin (MULTIVITAMINS PO) daily      multivitamin (THERAGRAN) TABS daily      Neupro 4 MG/24HR APPLY 1 PATCH TOPICALLY ONCE DAILY 30 patch 3    phenazopyridine (PYRIDIUM) 95 MG tablet Take 1 tablet (95 mg total) by mouth 3 (three) times a day as needed for bladder spasms 10 tablet 0    Probiotic Product (ALIGN) CHEW Chew daily       rivastigmine (EXELON) 9 5 mg/24 hr TD 24 hr patch Place 1 patch on the skin daily 30 patch 6     No current facility-administered medications for this visit  She is allergic to statins, naproxen, simvastatin, dust mite extract, and pollen extract       Review of Systems   Constitutional: Negative  Negative for chills and fever  HENT: Negative  Eyes: Negative  Respiratory: Negative  Cardiovascular: Negative  Gastrointestinal: Positive for nausea  Negative for abdominal pain, diarrhea and vomiting  Endocrine: Negative  Genitourinary: Positive for dysuria, flank pain, frequency and urgency  Negative for difficulty urinating  Skin: Negative  Allergic/Immunologic: Negative  Neurological: Negative  Hematological: Negative  Psychiatric/Behavioral: Negative  Objective:    /74   Pulse 79   Ht 5' 3" (1 6 m)   Wt 74 4 kg (164 lb)   BMI 29 05 kg/m²      Physical Exam  Constitutional:       General: She is not in acute distress  Appearance: Normal appearance  She is obese  She is not ill-appearing, toxic-appearing or diaphoretic  HENT:      Head: Normocephalic and atraumatic  Right Ear: External ear normal       Left Ear: External ear normal       Nose: Nose normal       Mouth/Throat:      Pharynx: Oropharynx is clear  Eyes:      General: No scleral icterus  Conjunctiva/sclera: Conjunctivae normal    Cardiovascular:      Rate and Rhythm: Normal rate and regular rhythm  Pulses: Normal pulses  Heart sounds: No murmur heard  No friction rub  No gallop  Pulmonary:      Effort: Pulmonary effort is normal  No respiratory distress  Breath sounds: No wheezing, rhonchi or rales     Abdominal:      General: Bowel sounds are normal  There is no distension  Palpations: Abdomen is soft  Tenderness: There is no abdominal tenderness  There is right CVA tenderness  There is no left CVA tenderness  Musculoskeletal:      Cervical back: Normal range of motion  Comments: Patient currently in a wheelchair   Skin:     General: Skin is warm and dry  Neurological:      Mental Status: She is alert and oriented to person, place, and time  Mental status is at baseline  Psychiatric:         Mood and Affect: Mood normal          Thought Content:  Thought content normal          Judgment: Judgment normal            Mali Houston PA-C

## 2021-07-02 NOTE — ASSESSMENT & PLAN NOTE
Patient currently reporting that she is experiencing flank pain along with nausea  She reports that this pain can be so bad that it wakes her from sleep  UA with micro showing calcium oxalate crystals  CT scan from 2018 did not reveal any note of kidney stones  Patient denies a history of kidney stones  Given patient's current symptomatology, physical exam findings along with recurrent UTIs will obtain a CT renal stone study for evaluation  For nephrolithiasis  Place this order as stat as given patient's recurrent UTIs with confusion, flank pain and nausea  As well as the upcoming weekend  will adjust treatment based off the results of CT scan

## 2021-07-02 NOTE — ASSESSMENT & PLAN NOTE
Patient has had several UTIs based off of urine dip and microscopic  There is only 1 urine culture from February which   Showed mixed contaminants  Patient is currently in a wheelchair and is unable to provide urine sample today  Discuss conservative measures with patient including increasing fluid intake by 1 5 L as this can decrease patient's chance of UTIs by 48%,   Cranberry juice and cranberry juice supplementation along with vitamin-C  Also discussed proper hygiene as patient currently uses pads/ briefs  Discussed that she should changes regularly to stay dry   And along with additional hygiene recommendations  Discussed more advanced treatment such as suppressive antibiotics and hiprex  Patient daughter and  are currently agreeable to plan at this time  Will follow-up in 6 weeks for reassessment of UTIs symptoms  Will adjust based off of CT imaging

## 2021-07-05 ENCOUNTER — APPOINTMENT (OUTPATIENT)
Dept: LAB | Facility: HOSPITAL | Age: 76
End: 2021-07-05
Payer: COMMERCIAL

## 2021-07-05 DIAGNOSIS — N39.0 FREQUENT UTI: ICD-10-CM

## 2021-07-05 LAB
BACTERIA UR QL AUTO: ABNORMAL /HPF
BILIRUB UR QL STRIP: NEGATIVE
CLARITY UR: CLEAR
COLOR UR: YELLOW
GLUCOSE UR STRIP-MCNC: NEGATIVE MG/DL
HGB UR QL STRIP.AUTO: NEGATIVE
KETONES UR STRIP-MCNC: NEGATIVE MG/DL
LEUKOCYTE ESTERASE UR QL STRIP: NEGATIVE
MUCOUS THREADS UR QL AUTO: ABNORMAL
NITRITE UR QL STRIP: NEGATIVE
NON-SQ EPI CELLS URNS QL MICRO: ABNORMAL /HPF
PH UR STRIP.AUTO: 5.5 [PH]
PROT UR STRIP-MCNC: NEGATIVE MG/DL
RBC #/AREA URNS AUTO: ABNORMAL /HPF
SP GR UR STRIP.AUTO: 1.02 (ref 1–1.03)
UROBILINOGEN UR QL STRIP.AUTO: 0.2 E.U./DL
WBC #/AREA URNS AUTO: ABNORMAL /HPF

## 2021-07-05 PROCEDURE — 81001 URINALYSIS AUTO W/SCOPE: CPT | Performed by: PHYSICIAN ASSISTANT

## 2021-07-05 PROCEDURE — 87086 URINE CULTURE/COLONY COUNT: CPT

## 2021-07-07 LAB — BACTERIA UR CULT: NORMAL

## 2021-07-12 ENCOUNTER — TELEPHONE (OUTPATIENT)
Dept: NEUROLOGY | Facility: CLINIC | Age: 76
End: 2021-07-12

## 2021-07-12 NOTE — TELEPHONE ENCOUNTER
Pt called in stating in the last month she has been noticing worsening in her ability to walk  She uses a wheelchair majority of the time for the last 6 months, however she continues to notice further decline  She states that PT made her aware that the muscles in her legs are strong enough to walk and she should contact our office  She is noticing that the worse time of day for her in the late afternoon  She is unable to go to her grandson's sports when they are in the evening  She has visited some nursing homes that will not accept her due to her wheelchair use  She is asking if there is anything our office could suggest to help her with her walking  Pt states that she did not think she would have to start looking for nursing home placement this early  She takes her current medications as follows:     Current medications and timing:  - Sinemet 25/100; 2 5tabs at 8am, 2tabs at 11, 2 5tabs at 2pm, 2tabs at 5pm  - Neupro Patch 4mg evening    - Exelon patch 9 5mg   - Lexapro 20mg daily     Please advise         Team 6- please first trying calling pt back at [phone] and if she does not answer she would like us to speak with her partner Petar at [phone]

## 2021-07-13 NOTE — TELEPHONE ENCOUNTER
Lets try and get her in for an earlier appt with me, Dr Isacc De Souza or [facility] to assess and perhaps make some medication changes  Thanks!

## 2021-07-14 ENCOUNTER — OFFICE VISIT (OUTPATIENT)
Dept: INTERNAL MEDICINE CLINIC | Facility: CLINIC | Age: 76
End: 2021-07-14
Payer: COMMERCIAL

## 2021-07-14 VITALS
HEART RATE: 78 BPM | HEIGHT: 63 IN | BODY MASS INDEX: 28.6 KG/M2 | DIASTOLIC BLOOD PRESSURE: 78 MMHG | SYSTOLIC BLOOD PRESSURE: 120 MMHG | WEIGHT: 161.4 LBS | TEMPERATURE: 97.5 F | OXYGEN SATURATION: 94 %

## 2021-07-14 DIAGNOSIS — M25.552 HIP PAIN, BILATERAL: Primary | ICD-10-CM

## 2021-07-14 DIAGNOSIS — M25.551 HIP PAIN, BILATERAL: Primary | ICD-10-CM

## 2021-07-14 PROCEDURE — 1036F TOBACCO NON-USER: CPT | Performed by: NURSE PRACTITIONER

## 2021-07-14 PROCEDURE — 3008F BODY MASS INDEX DOCD: CPT | Performed by: NURSE PRACTITIONER

## 2021-07-14 PROCEDURE — 99213 OFFICE O/P EST LOW 20 MIN: CPT | Performed by: NURSE PRACTITIONER

## 2021-07-14 NOTE — PROGRESS NOTES
INTERNAL MEDICINE FOLLOW-UP OFFICE VISIT  [facility]    NAME: Anival Winslow  AGE: 77 y o  SEX: female    DATE OF ENCOUNTER: 7/15/2021   Assessment and Plan:   Advised xray of hip to assess for arthritis changes  Advised to continue with tylenol or aspirin for pains  Will try Voltaren gel to back and hip areas  Continue with warm compresses  Problem List Items Addressed This Visit     None      Visit Diagnoses     Hip pain, bilateral    -  Primary    Relevant Medications    Diclofenac Sodium (VOLTAREN) 1 %    Other Relevant Orders    XR hip/pelv 2-3 vws right if performed    XR hip/pelv 2-3 vws left if performed          Return if symptoms worsen or fail to improve, for Next scheduled follow up  Counseling:     · Medication Side Effects - Adverse side effects of medications were reviewed with the patient/guardian today: Yes  · Counseling was given regarding: Prognosis, Risks and benefits of tx options, Intructions for management, Patient and family education, Importance of tx compliance, Risk factor reductions and Impressions  · Barriers to treatment include: No identified barriers      Chief Complaint:     Chief Complaint   Patient presents with    Back Pain        History of Present Illness:     Patient has complaints of low back pains  Rates pain 2/10  She takes tylenol or aspirin for pain which helps  She has not taken any medication in over one week  She points to an area of pain that is more or less the bilateral hips  States when she sleeps on her side at night she has pains here  Patient has parkinson's and at times needs to use a wheelchair due to inability to walk  States she does not necessarily have weakness in the legs but sometimes she just cannot move them  She tries to walk as much as possible  She has had chronic low back pains with suspected UTI's   Patient recently saw urology who ordered a CT renal which showed small non obstructive nephrolithiasis  She has follow up with urology in 6 weeks  Patient also admits to feeling depressed about her condition  She used to play sports as a coping mechanism but now that she has trouble walking at times she does not have this coping strategy  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  She is scheduled with behavioral health in a few weeks and is hoping if she can get to a better place mentally she will not have as many flare ups and bad days with her parkinson's  The following portions of the patient's history were reviewed and updated as appropriate: allergies, current medications, past family history, past medical history, past social history, past surgical history and problem list      Review of Systems:     Review of Systems   Constitutional: Negative for chills, fatigue and fever  Genitourinary: Positive for frequency and urgency  Musculoskeletal: Positive for arthralgias, back pain and myalgias  Skin: Negative for color change and rash  Psychiatric/Behavioral: Positive for dysphoric mood and sleep disturbance          Problem List:     Patient Active Problem List   Diagnosis    Parkinson's disease ([facility] [address] )    Scoliosis    Low back pain    Acquired hypothyroidism    Elevated antinuclear antibody (RAS) level    Family hx-breast malignancy    Mixed hyperlipidemia    Rosacea    Trochanteric bursitis    Urinary incontinence    Vitamin D deficiency    Flank pain    Irritable bowel syndrome with both constipation and diarrhea    REM behavioral disorder    Anxiety    Recurrent major depressive disorder, in partial remission ([facility] [address] )    Overweight    Osteopenia of multiple sites    Frequent UTI        Objective:     /78 (BP Location: Left arm, Patient Position: Sitting, Cuff Size: Standard)   Pulse 78   Temp 97 5 °F (36 4 °C) (Temporal) Comment: no nsaids  Ht 5' 3" (1 6 m)   Wt 73 2 kg (161 lb 6 4 oz)   SpO2 94%   BMI 28 59 kg/m²     Physical Exam  Vitals reviewed  Constitutional:       General: She is not in acute distress  Appearance: Normal appearance  She is well-developed  She is not ill-appearing  HENT:      Head: Normocephalic and atraumatic  Musculoskeletal:      Thoracic back: Tenderness present  No bony tenderness  Deformity: scoliosis  Scoliosis present  Lumbar back: Tenderness present  No bony tenderness  Scoliosis present  Right hip: Tenderness present  Left hip: Tenderness present  Skin:     General: Skin is warm and dry  Capillary Refill: Capillary refill takes less than 2 seconds  Findings: No erythema or rash  Neurological:      Mental Status: She is alert and oriented to person, place, and time  Psychiatric:         Attention and Perception: Attention normal          Mood and Affect: Mood is depressed  Speech: Speech normal          Behavior: Behavior normal  Behavior is cooperative  Thought Content: Thought content normal          Pertinent Laboratory/Diagnostic Studies:    Laboratory Results: I have personally reviewed the pertinent laboratory results/reports   Radiology/Other Diagnostic Testing Results: I have personally reviewed pertinent reports         Current Medications:     Current Outpatient Medications   Medication Sig Dispense Refill    Ascorbic Acid (vitamin C) 100 MG tablet Take by mouth      carbidopa-levodopa (SINEMET)  mg per tablet Take 2 5tabs at 8 am, 2 tabs at 11:30 am, 2 5 tabs at 3pm and 2 tabs at 6:30pm  810 tablet 3    Cholecalciferol (VITAMIN D) 2000 units tablet 1 daily      Cranberry 1000 MG CAPS Take 4,200 mg by mouth as needed       CRANBERRY PO Take by mouth      cyanocobalamin (VITAMIN B-12) 1,000 mcg tablet Take 1,000 mcg by mouth daily      cyanocobalamin (VITAMIN B-12) 100 MCG tablet Take by mouth      escitalopram (LEXAPRO) 20 mg tablet Take 1 tablet (20 mg total) by mouth daily 90 tablet 3    Homeopathic Products (PROSACEA) GEL Apply topically as needed       levothyroxine 50 mcg tablet Take 1 tablet by mouth once daily 90 tablet 0    Multiple Vitamin (MULTIVITAMINS PO) daily      multivitamin (THERAGRAN) TABS daily      Neupro 4 MG/24HR APPLY 1 PATCH TOPICALLY ONCE DAILY 30 patch 3    Probiotic Product (ALIGN) CHEW Chew daily       rivastigmine (EXELON) 9 5 mg/24 hr TD 24 hr patch Place 1 patch on the skin daily 30 patch 6    aspirin (ECOTRIN LOW STRENGTH) 81 mg EC tablet Take 81 mg by mouth daily as needed  (Patient not taking: Reported on 7/14/2021)      Cholecalciferol 25 MCG (1000 UT) capsule Take by mouth (Patient not taking: Reported on 7/14/2021)      Diclofenac Sodium (VOLTAREN) 1 % Apply 2 g topically 4 (four) times a day 350 g 0    phenazopyridine (PYRIDIUM) 95 MG tablet Take 1 tablet (95 mg total) by mouth 3 (three) times a day as needed for bladder spasms (Patient not taking: Reported on 7/14/2021) 10 tablet 0     No current facility-administered medications for this visit  Patient Instructions   Osteoarthritis   AMBULATORY CARE:   Osteoarthritis  occurs when cartilage (tissue that cushions a joint) wears away slowly and causes the bones to rub together  Osteoarthritis (OA) is a long-term condition that often affects the hands, neck, lower back, knees, and hips  OA is also called arthrosis or degenerative joint disease  Common signs and symptoms include the following:   · Joint pain that gets worse when you move the joint     · Joint stiffness that decreases after you move the joint     · Decreased range of movement     · Hard, bony enlargement on your fingers or toes    · A grinding or cracking sound when you move your joint    Call your doctor or specialist if:   · You have severe pain  · You cannot move your joint  · You have a fever  · Your joint is red and tender  · You have questions or concerns about your condition or care      Treatment for osteoarthritis  may include any of the following:  · Acetaminophen  decreases pain and fever  It is available without a doctor's order  Ask how much to take and how often to take it  Follow directions  Read the labels of all other medicines you are using to see if they also contain acetaminophen, or ask your doctor or pharmacist  Acetaminophen can cause liver damage if not taken correctly  Do not use more than 4 grams (4,000 milligrams) total of acetaminophen in one day  · NSAIDs , such as ibuprofen, help decrease swelling, pain, and fever  This medicine is available with or without a doctor's order  NSAIDs can cause stomach bleeding or kidney problems in certain people  If you take blood thinner medicine, always ask your healthcare provider if NSAIDs are safe for you  Always read the medicine label and follow directions  · Capsaicin cream  may help decrease pain in your joint  · Prescription pain medicine  may be given  Ask your healthcare provider how to take this medicine safely  Some prescription pain medicines contain acetaminophen  Do not take other medicines that contain acetaminophen without talking to your healthcare provider  Too much acetaminophen may cause liver damage  Prescription pain medicine may cause constipation  Ask your healthcare provider how to prevent or treat constipation  · A steroid injection  may be given if your symptoms get worse  · Physical therapy  is used to teach you exercises to help improve movement and strength, and to decrease pain  A physical therapist may move an area with his or her hands  For example, he or she may move your leg in certain ways to treat osteoarthritis in your hip  · Ultrasound  may be used to treat osteoarthritis in certain areas, such as your knee  Ultrasound produces heat that can relieve pain  · Surgery  may be needed if other treatments do not work  Manage your symptoms:   · Stay active    Physical activity may reduce your pain and improve your ability to do daily activities  Avoid activities that cause pain  Ask your healthcare provider what type of exercise would be best for you  · Maintain a healthy weight  This helps decrease the strain on the joints in your back, hips, knees, ankles, and feet  Ask your healthcare provider what a healthy weight is for you  He or she can help you create a weight loss plan if you are overweight  · Use heat or ice on your joints as directed  Heat and ice help decrease pain, swelling, and muscle spasms  For heat, use a heating pad on a low setting for 20 minutes, or take a warm bath  For ice, use an ice pack, or put crushed ice in a plastic bag  Cover it with a towel before you place it on your joint  Use ice for 15 minutes every hour  · Massage the muscles around the joint  Massage helps relieve pain and stiffness  Your healthcare provider or a physical therapist can show you how to do this  If you have hip OA, another person may need to help you massage the area  · Use a cane, crutches, or a walker if directed  These help protect and relieve pressure on your ankle, knee, and hip joints  You may also be prescribed shoe inserts to decrease pressure in your joints  · Wear flat or low-heeled shoes  This will help decrease pain and reduce pressure on your ankle, knee, and hip joints  Follow up with your healthcare provider as directed:  Write down your questions so you remember to ask them during your visits  © Copyright [address] Information is for End User's use only and may not be sold, redistributed or otherwise used for commercial purposes  All illustrations and images included in CareNotes® are the copyrighted property of [employer] D A M [employer] Inc  or [facility] Lito Martinez   The above information is an  only  It is not intended as medical advice for individual conditions or treatments   Talk to your doctor, nurse or pharmacist before following any medical regimen to see if it is safe and effective for you          SAGE Desouza  [facility]

## 2021-07-15 DIAGNOSIS — E03.9 ACQUIRED HYPOTHYROIDISM: ICD-10-CM

## 2021-07-15 DIAGNOSIS — G20 PARKINSON'S DISEASE (HCC): ICD-10-CM

## 2021-07-15 RX ORDER — ROTIGOTINE 4 MG/24H
1 PATCH, EXTENDED RELEASE TRANSDERMAL DAILY
Qty: 30 PATCH | Refills: 3 | Status: SHIPPED | OUTPATIENT
Start: 2021-07-15 | End: 2021-12-23

## 2021-07-15 RX ORDER — LEVOTHYROXINE SODIUM 0.05 MG/1
50 TABLET ORAL DAILY
Qty: 90 TABLET | Refills: 0 | Status: SHIPPED | OUTPATIENT
Start: 2021-07-15 | End: 2022-08-09

## 2021-07-15 NOTE — PATIENT INSTRUCTIONS
Osteoarthritis   AMBULATORY CARE:   Osteoarthritis  occurs when cartilage (tissue that cushions a joint) wears away slowly and causes the bones to rub together  Osteoarthritis (OA) is a long-term condition that often affects the hands, neck, lower back, knees, and hips  OA is also called arthrosis or degenerative joint disease  Common signs and symptoms include the following:   · Joint pain that gets worse when you move the joint     · Joint stiffness that decreases after you move the joint     · Decreased range of movement     · Hard, bony enlargement on your fingers or toes    · A grinding or cracking sound when you move your joint    Call your doctor or specialist if:   · You have severe pain  · You cannot move your joint  · You have a fever  · Your joint is red and tender  · You have questions or concerns about your condition or care  Treatment for osteoarthritis  may include any of the following:  · Acetaminophen  decreases pain and fever  It is available without a doctor's order  Ask how much to take and how often to take it  Follow directions  Read the labels of all other medicines you are using to see if they also contain acetaminophen, or ask your doctor or pharmacist  Acetaminophen can cause liver damage if not taken correctly  Do not use more than 4 grams (4,000 milligrams) total of acetaminophen in one day  · NSAIDs , such as ibuprofen, help decrease swelling, pain, and fever  This medicine is available with or without a doctor's order  NSAIDs can cause stomach bleeding or kidney problems in certain people  If you take blood thinner medicine, always ask your healthcare provider if NSAIDs are safe for you  Always read the medicine label and follow directions  · Capsaicin cream  may help decrease pain in your joint  · Prescription pain medicine  may be given  Ask your healthcare provider how to take this medicine safely  Some prescription pain medicines contain acetaminophen   Do not take other medicines that contain acetaminophen without talking to your healthcare provider  Too much acetaminophen may cause liver damage  Prescription pain medicine may cause constipation  Ask your healthcare provider how to prevent or treat constipation  · A steroid injection  may be given if your symptoms get worse  · Physical therapy  is used to teach you exercises to help improve movement and strength, and to decrease pain  A physical therapist may move an area with his or her hands  For example, he or she may move your leg in certain ways to treat osteoarthritis in your hip  · Ultrasound  may be used to treat osteoarthritis in certain areas, such as your knee  Ultrasound produces heat that can relieve pain  · Surgery  may be needed if other treatments do not work  Manage your symptoms:   · Stay active  Physical activity may reduce your pain and improve your ability to do daily activities  Avoid activities that cause pain  Ask your healthcare provider what type of exercise would be best for you  · Maintain a healthy weight  This helps decrease the strain on the joints in your back, hips, knees, ankles, and feet  Ask your healthcare provider what a healthy weight is for you  He or she can help you create a weight loss plan if you are overweight  · Use heat or ice on your joints as directed  Heat and ice help decrease pain, swelling, and muscle spasms  For heat, use a heating pad on a low setting for 20 minutes, or take a warm bath  For ice, use an ice pack, or put crushed ice in a plastic bag  Cover it with a towel before you place it on your joint  Use ice for 15 minutes every hour  · Massage the muscles around the joint  Massage helps relieve pain and stiffness  Your healthcare provider or a physical therapist can show you how to do this  If you have hip OA, another person may need to help you massage the area  · Use a cane, crutches, or a walker if directed    These help protect and relieve pressure on your ankle, knee, and hip joints  You may also be prescribed shoe inserts to decrease pressure in your joints  · Wear flat or low-heeled shoes  This will help decrease pain and reduce pressure on your ankle, knee, and hip joints  Follow up with your healthcare provider as directed:  Write down your questions so you remember to ask them during your visits  © Copyright [address] Information is for End User's use only and may not be sold, redistributed or otherwise used for commercial purposes  All illustrations and images included in CareNotes® are the copyrighted property of [employer]  or [address] [place] [address]  The above information is an  only  It is not intended as medical advice for individual conditions or treatments  Talk to your doctor, nurse or pharmacist before following any medical regimen to see if it is safe and effective for you

## 2021-07-20 ENCOUNTER — APPOINTMENT (OUTPATIENT)
Dept: RADIOLOGY | Facility: CLINIC | Age: 76
End: 2021-07-20
Payer: COMMERCIAL

## 2021-07-20 DIAGNOSIS — M25.552 HIP PAIN, BILATERAL: ICD-10-CM

## 2021-07-20 DIAGNOSIS — M25.551 HIP PAIN, BILATERAL: ICD-10-CM

## 2021-07-20 PROCEDURE — 73522 X-RAY EXAM HIPS BI 3-4 VIEWS: CPT

## 2021-07-27 ENCOUNTER — TELEPHONE (OUTPATIENT)
Dept: INTERNAL MEDICINE CLINIC | Facility: CLINIC | Age: 76
End: 2021-07-27

## 2021-07-27 NOTE — TELEPHONE ENCOUNTER
----- Message from Raye Kayser, [place] sent at 7/27/2021  9:48 AM EDT -----   Please let patient know Xray shows Left hip moderate arthritis, right hip severe arthritis  I would recommend possible PT if able to do this  I will place the order  If pains get worse or walking difficulties persist, despite PT, would recommend follow up with orthopedics  Thanks

## 2021-07-27 NOTE — TELEPHONE ENCOUNTER
Logan and Lashonda Haimlton,    Pt called in states that she continues to struggle with her walking  She would like to been seen ASAP as she continues to decline  I did move up her appt with Gil Seaman to 8/6 but she asked if possible that we get her in even sooner  She states that if we can find a sooner appt with Dr Leida Desir or Fina Owens then she would be willing to go to any location  Could either/both of you please look into this and see if there is anything better we can offer this patient? Thanks much!     [phone]

## 2021-07-28 ENCOUNTER — OFFICE VISIT (OUTPATIENT)
Dept: NEUROLOGY | Facility: CLINIC | Age: 76
End: 2021-07-28
Payer: COMMERCIAL

## 2021-07-28 VITALS
DIASTOLIC BLOOD PRESSURE: 66 MMHG | SYSTOLIC BLOOD PRESSURE: 130 MMHG | HEIGHT: 63 IN | WEIGHT: 161 LBS | HEART RATE: 89 BPM | BODY MASS INDEX: 28.53 KG/M2

## 2021-07-28 DIAGNOSIS — G20 PARKINSON'S DISEASE (HCC): Primary | ICD-10-CM

## 2021-07-28 PROCEDURE — 99214 OFFICE O/P EST MOD 30 MIN: CPT | Performed by: PHYSICIAN ASSISTANT

## 2021-07-28 NOTE — PROGRESS NOTES
Patient ID: Rebeca Herbert is a 77 y o  female  Assessment/Plan:    Parkinson's disease ([facility] [address] )  Patient with some worsening of gait and FoG since the last   She did not feel that there was any benefit with her freezing on higher rivastigmine doses in the past and is concerned with the potential side effects with Amantadine  We will try and make a slight increase to her Sinemet dose however to see if there is any benefit  She will start taking Sinemet 2 5 tabs 4 times a day  If with this increase she has any side effects including hallucinations, lightheadedness or dizziness when standing, obsessive-compulsive behaviors or dyskinesias (involuntary movements) then she will reduce back to her current dose  If she is doing well on 2 5tabs each time then I can give her Sinemet 25/250mg tabs for her next refill  She was also encouraged to remain active and continue with her exercises and activity  She does continue to struggle with anxiety and I am very happy that she will be following with psychiatry next week  Subjective:    Rosibel Benitez is a 77year-old right-handed woman with levodopa-responsive Parkinson's disease  To review, symptom onset in 2011 with left foot tremor, later complicated by non-motor wearing-off, slight dyskinesias, FoG  In Fall of 2011 she noted some balance trouble and later developed micrographia  She was started on Azilect 1mg with little notable improvement  Neupro patch later added  Sinemet was added later and has been titrated up over time  At her last visit she had some improvement with the increased Sinemet dose however she was also having some dyskinesias  She felt she was overall doing well and no changes were made  She scored a 25/30 on MoCA  INTERVAL HISTORY:  She called the office 7/12 with concerns for worsening gait  She now needs to use the wheel chair more often  She feels that she is freezing her gait more often now     Some days she feels better than others  She has an appointment with psychologist next week  She struggles with a lot of anxiety and worry  She is not sure if she is necessarily any better when she takes 2 5tabs versus 2tabs  No longer having any hallucinations  Current medications and timing:  - Sinemet 25/100; 2 5tabs at 8am, 2tabs at 11, 2 5tabs at 2pm, 2tabs at 5pm  - Neupro Patch 4mg evening    - Exelon patch 9 5mg   - Lexapro 20mg daily      Prior medication trials:  - Azilect 0 5mg in the evening (stopped by PCP, gait worsened without it)        I personally reviewed and updated the ROS  Objective:    Blood pressure 130/66, pulse 89, height 5' 3" (1 6 m), weight 73 kg (161 lb)  Physical Exam  Constitutional:       Appearance: Normal appearance  HENT:      Right Ear: Hearing normal       Left Ear: Hearing normal    Eyes:      General: Lids are normal       Extraocular Movements: Extraocular movements intact  Pupils: Pupils are equal, round, and reactive to light  Pulmonary:      Effort: Pulmonary effort is normal    Neurological:      Mental Status: She is alert  Deep Tendon Reflexes: Strength normal    Psychiatric:         Speech: Speech normal          Neurological Exam  Mental Status  Alert  Speech is normal   MoCA 25/30 - 6/15/21  Cranial Nerves  CN III, IV, VI: Extraocular movements intact bilaterally  Normal lids and orbits bilaterally  Pupils equal round and reactive to light bilaterally  CN V:  Right: Facial sensation is normal   Left: Facial sensation is normal on the left  CN VIII:  Right: Hearing is normal   Left: Hearing is normal   CN XI: Shoulder shrug strength is normal     Motor   Strength is 5/5 throughout all four extremities  Sensory  Light touch is normal in upper and lower extremities  Coordination  Right: Finger-to-nose normal   Left: Finger-to-nose normal   No dyskinesias   Gait    Able to stand from the wheelchair using hands  Slightly stooped posture           ROS:    Review of Systems   Constitutional: Negative  Negative for appetite change and fever  HENT: Negative  Negative for hearing loss, tinnitus, trouble swallowing and voice change  Eyes: Negative  Negative for photophobia and pain  Respiratory: Negative  Negative for shortness of breath  Cardiovascular: Negative  Negative for palpitations  Gastrointestinal: Negative  Negative for nausea and vomiting  Endocrine: Negative  Negative for cold intolerance  Genitourinary: Positive for difficulty urinating and urgency  Negative for dysuria and frequency  Musculoskeletal: Positive for gait problem  Negative for myalgias and neck pain  Skin: Negative  Negative for rash  Neurological: Positive for tremors (left foot ) and weakness (in legs )  Negative for dizziness, seizures, syncope, facial asymmetry, speech difficulty, light-headedness, numbness and headaches  Hematological: Negative  Does not bruise/bleed easily  Psychiatric/Behavioral: Negative for confusion, hallucinations and sleep disturbance

## 2021-07-28 NOTE — PATIENT INSTRUCTIONS
Patient with some worsening of gait and FoG since the last   She did not feel that there was any benefit with her freezing on higher rivastigmine doses in the past and is concerned with the potential side effects with Amantadine  We will try and make a slight increase to her Sinemet dose however to see if there is any benefit  She will start taking Sinemet 2 5 tabs 4 times a day  If with this increase she has any side effects including hallucinations, lightheadedness or dizziness when standing, obsessive-compulsive behaviors or dyskinesias (involuntary movements) then she will reduce back to her current dose  If she is doing well on 2 5tabs each time then I can give her Sinemet 25/250mg tabs for her next refill  She was also encouraged to remain active and continue with her exercises and activity  She does continue to struggle with anxiety and I am very happy that she will be following with psychiatry next week

## 2021-08-02 ENCOUNTER — SOCIAL WORK (OUTPATIENT)
Dept: BEHAVIORAL/MENTAL HEALTH CLINIC | Age: 76
End: 2021-08-02
Payer: COMMERCIAL

## 2021-08-02 DIAGNOSIS — G20 PARKINSON'S DISEASE (HCC): Primary | ICD-10-CM

## 2021-08-02 DIAGNOSIS — F33.41 MDD (MAJOR DEPRESSIVE DISORDER), RECURRENT, IN PARTIAL REMISSION (HCC): Primary | ICD-10-CM

## 2021-08-02 PROCEDURE — 90834 PSYTX W PT 45 MINUTES: CPT | Performed by: SOCIAL WORKER

## 2021-08-02 NOTE — TELEPHONE ENCOUNTER
Patient's  stated the patient's been doing well on the sinemet dose and is requesting updated rx be sent  Per Amaury Donis 7/28 office note:    She will start taking Sinemet 2 5 tabs 4 times a day  If with this increase she has any side effects including hallucinations, lightheadedness or dizziness when standing, obsessive-compulsive behaviors or dyskinesias (involuntary movements) then she will reduce back to her current dose      If she is doing well on 2 5tabs each time then I can give her Sinemet 25/250mg tabs for her next refill  [facility] on file   is requesting a call back once rx has been sent
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
## 2021-08-03 ENCOUNTER — OFFICE VISIT (OUTPATIENT)
Dept: UROLOGY | Facility: CLINIC | Age: 76
End: 2021-08-03
Payer: COMMERCIAL

## 2021-08-03 VITALS
BODY MASS INDEX: 28.39 KG/M2 | DIASTOLIC BLOOD PRESSURE: 60 MMHG | WEIGHT: 160.2 LBS | HEIGHT: 63 IN | HEART RATE: 89 BPM | SYSTOLIC BLOOD PRESSURE: 122 MMHG

## 2021-08-03 DIAGNOSIS — N39.0 FREQUENT UTI: Primary | ICD-10-CM

## 2021-08-03 DIAGNOSIS — R10.9 FLANK PAIN: ICD-10-CM

## 2021-08-03 PROCEDURE — 99213 OFFICE O/P EST LOW 20 MIN: CPT | Performed by: PHYSICIAN ASSISTANT

## 2021-08-03 PROCEDURE — 1160F RVW MEDS BY RX/DR IN RCRD: CPT | Performed by: PHYSICIAN ASSISTANT

## 2021-08-03 PROCEDURE — 1036F TOBACCO NON-USER: CPT | Performed by: PHYSICIAN ASSISTANT

## 2021-08-03 PROCEDURE — 3008F BODY MASS INDEX DOCD: CPT | Performed by: PHYSICIAN ASSISTANT

## 2021-08-03 RX ORDER — CARBIDOPA/LEVODOPA 25MG-250MG
TABLET ORAL
Qty: 120 TABLET | Refills: 3 | Status: SHIPPED | OUTPATIENT
Start: 2021-08-03 | End: 2021-09-14 | Stop reason: DRUGHIGH

## 2021-08-03 NOTE — PATIENT INSTRUCTIONS
Daily women's probiotic  Daily cranberry  At least 60 oz of water  Standing urine cultures placed if symptomatic    For the kidney stones;  Will get xray and ultrasound next year to re-evaluate  Call with any questions or concerns in the meantime

## 2021-08-03 NOTE — PROGRESS NOTES
8/3/2021      Chief Complaint   Patient presents with    Urinary Tract Infection    Nephrolithiasis         Assessment and Plan  1  Urine testing  - Urine culture (7/5/21) negative  - Discussed conservative management including adequate hydration, avoidance of bladder irritants, daily cranberry, daily probiotics, avoidance of constipation   - Standing urine cultures placed  - All questions answered; patient understands and agrees with plan    2  Nephrolithiasis  - CT showing small 3-4 mm stones bilaterally; no obstructing stones  - Asymptomatic  - Never passed a stone  - Will obtain KUB and US in 1 year for continued surveillance  - Will call in the meantime with any questions or concerns  - All questions answered; patient understands and agrees with plan        History of Present Illness  Torrey Mancia is a 77 y o  female patient with a history of nephrolithiasis and UTI here for follow up  Urine culture (7/5/21) was negative for infection  Patient denies frequency, urgency, dysuria, gross hematuria, flank pain, suprapubic pain, fevers, chills  Patient denies taking probiotic  Does take cranberry  Drinks about 20 oz of water a day  Recent CT showing small 3-4 mm stones bilaterally  No obstructing stones present  Patient denies ever passing stones or needing surgical procedures  Patient denies symptoms at this time  UA unable to provide sample    Review of Systems   Constitutional: Negative for activity change, appetite change, chills and fever  HENT: Negative for congestion and trouble swallowing  Respiratory: Negative for cough and shortness of breath  Cardiovascular: Negative for chest pain, palpitations and leg swelling  Gastrointestinal: Negative for abdominal pain, constipation, diarrhea, nausea and vomiting  Genitourinary: Negative for difficulty urinating, dysuria, flank pain, frequency, hematuria and urgency  Musculoskeletal: Negative for back pain and gait problem     Skin: Negative for wound  Allergic/Immunologic: Negative for immunocompromised state  Neurological: Negative for dizziness and syncope  Hematological: Does not bruise/bleed easily  Psychiatric/Behavioral: Negative for confusion  All other systems reviewed and are negative  Vitals  Vitals:    08/03/21 1057   BP: 122/60   Pulse: 89   Weight: 72 7 kg (160 lb 3 2 oz)   Height: 5' 3" (1 6 m)       Physical Exam  Constitutional:       Appearance: Normal appearance  HENT:      Head: Normocephalic  Pulmonary:      Effort: Pulmonary effort is normal    Abdominal:      Tenderness: There is no right CVA tenderness or left CVA tenderness  Musculoskeletal:      Cervical back: Normal range of motion  Skin:     General: Skin is warm and dry  Neurological:      General: No focal deficit present  Mental Status: She is alert and oriented to person, place, and time  Psychiatric:         Mood and Affect: Mood normal          Behavior: Behavior normal          Thought Content:  Thought content normal          Judgment: Judgment normal            Past History  Past Medical History:   Diagnosis Date    Anxiety     Disease of thyroid gland     Generalized anxiety disorder 4/23/2015    Hepatic disease     Infectious viral hepatitis     Liver disease     Osteoporosis     Overweight (BMI 25 0-29 9) 2/12/2020    Parkinson's disease ([facility] [address] )     Patient refuses to disclose advance directive information 5/23/2018    Overview:  Yes, Patient instructed to provide copy of advance directive for provider to review and to be scanned into Electronic Medical Record  Overview:  Yes, Patient instructed to provide copy of advance directive for provider to review and to be scanned into Electronic Medical Record    Rosacea     Senile osteoporosis 5/27/2003    Shortness of breath      Social History     Socioeconomic History    Marital status: Significant Other     Spouse name: None    Number of children: None    Years of education: Master's Degree    Highest education level: None   Occupational History     Comment: Retired   Tobacco Use    Smoking status: Never Smoker    Smokeless tobacco: Never Used   Vaping Use    Vaping Use: Never used   Substance and Sexual Activity    Alcohol use: Not Currently     Alcohol/week: 1 0 standard drinks     Types: 1 Glasses of wine per week     Comment: Alcohol use per [employer]    Drug use: No    Sexual activity: Never   Other Topics Concern    None   Social History Narrative    Denied:  History of caffeine use     Social Determinants of Health     Financial Resource Strain:     Difficulty of Paying Living Expenses:    Food Insecurity:     Worried About Running Out of Food in the Last Year:     Ran Out of Food in the Last Year:    Transportation Needs:     Lack of Transportation (Medical):  Lack of Transportation (Non-Medical):    Physical Activity: Insufficiently Active    Days of Exercise per Week: 1 day    Minutes of Exercise per Session: 10 min   Stress: No Stress Concern Present    Feeling of Stress :  Only a little   Social Connections:     Frequency of Communication with Friends and Family:     Frequency of Social Gatherings with Friends and Family:     Attends Restoration Services:     Active Member of Clubs or Organizations:     Attends Club or Organization Meetings:     Marital Status:    Intimate Partner Violence:     Fear of Current or Ex-Partner:     Emotionally Abused:     Physically Abused:     Sexually Abused:      Social History     Tobacco Use   Smoking Status Never Smoker   Smokeless Tobacco Never Used     Family History   Problem Relation Age of Onset    Lung cancer Mother     Dementia Mother    Adrian Pickens Mother     Lung cancer Father     Cancer Father     Esophageal cancer Brother     Cancer Brother     Cavazos's esophagus Brother     Esophageal cancer Family     Heart disease Family     Stroke Family         syndrome       The following portions of the patient's history were reviewed and updated as appropriate: allergies, current medications, past medical history, past social history, past surgical history and problem list     Results  No results found for this or any previous visit (from the past 1 hour(s))  ]  No results found for: PSA  Lab Results   Component Value Date    CALCIUM 8 9 02/17/2021    K 4 4 02/17/2021    CO2 28 02/17/2021     02/17/2021    BUN 20 02/17/2021    CREATININE 0 86 02/17/2021     Lab Results   Component Value Date    WBC 7 48 02/17/2021    HGB 15 0 02/17/2021    HCT 47 4 (H) 02/17/2021    MCV 92 02/17/2021     02/17/2021       Christy Prince PA-C

## 2021-08-04 ENCOUNTER — TELEPHONE (OUTPATIENT)
Dept: NEUROLOGY | Facility: CLINIC | Age: 76
End: 2021-08-04

## 2021-08-04 NOTE — TELEPHONE ENCOUNTER
Called pt and spoke to Clarita's  in regards to medication change  The  stated he was thankful for the refill and also thankful for the call to inform them of the directions of how to take the medication

## 2021-08-16 ENCOUNTER — SOCIAL WORK (OUTPATIENT)
Dept: BEHAVIORAL/MENTAL HEALTH CLINIC | Age: 76
End: 2021-08-16
Payer: COMMERCIAL

## 2021-08-16 DIAGNOSIS — F33.41 MDD (MAJOR DEPRESSIVE DISORDER), RECURRENT, IN PARTIAL REMISSION (HCC): Primary | ICD-10-CM

## 2021-08-16 PROCEDURE — 90834 PSYTX W PT 45 MINUTES: CPT | Performed by: SOCIAL WORKER
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
## 2021-08-23 ENCOUNTER — TELEPHONE (OUTPATIENT)
Dept: NEUROLOGY | Facility: CLINIC | Age: 76
End: 2021-08-23

## 2021-08-23 NOTE — TELEPHONE ENCOUNTER
Pt called and ask how she is to be taking the carbidopa-levodopa   Made her aware of the following message from Jena Brunner, PA-C  to [employer], MA        12:11 PM  Tomasz Curiel can we please call and let the  know that the script was sent for 25/250mg tabs so she will only now need to take 1tab four times a day  Hunter Krishnamurthy! Pt verbalizes understanding of directions

## 2021-08-30 ENCOUNTER — SOCIAL WORK (OUTPATIENT)
Dept: BEHAVIORAL/MENTAL HEALTH CLINIC | Age: 76
End: 2021-08-30
Payer: COMMERCIAL

## 2021-08-30 DIAGNOSIS — F33.41 RECURRENT MAJOR DEPRESSIVE DISORDER, IN PARTIAL REMISSION (HCC): Primary | ICD-10-CM

## 2021-08-30 PROCEDURE — 90832 PSYTX W PT 30 MINUTES: CPT | Performed by: SOCIAL WORKER

## 2021-08-30 NOTE — PSYCH
Start Time 12:25PM-12:55PM  Assessment/Plan:  Therapy for Major depressive disorder in partial remission  There are no diagnoses linked to this encounter  Subjective:  Vanessa Flynn states that she has made her decision about her house and she is comfortable with her decision  She feels like her daughter is looking to inherit something from her mom when she passes  Her daughter has a 4 year degree and she used to work for the city [employer] and she left  Her daughter has not worked in the past 4 years  She is going to have a discussion with her daughter this weekend about her house  Vanessa Flynn is appropriately dressed in a casual manner, wearing glasses, overweight, short hair and in a wheelchair  Thought process is normal, speech is coherent  Mood is dysthymic and affect cannot be assessed due to Covid masking  Vanessa Flynn states that she has a hard time sticking up for herself  Reflective listening and validation of feelings  Vanessa Flynn was late to the visit  Patient ID: Austin Berrios is a 77 y o  female  HPI    Review of Systems   Vanessa Flynn reports taking her medications as prescribed  Objective:  Vanessa Flynn appears to be making progess as evidenced by her active participation in the entire session  She is friendly and cooperative  She has the support of her long standing boyfriend and her children  Physical Exam  Mental health:  Vanessa Flynn denies any SI/HI or AH/VH

## 2021-09-03 DIAGNOSIS — F33.41 RECURRENT MAJOR DEPRESSIVE DISORDER, IN PARTIAL REMISSION (HCC): ICD-10-CM

## 2021-09-03 RX ORDER — ESCITALOPRAM OXALATE 20 MG/1
20 TABLET ORAL DAILY
Qty: 90 TABLET | Refills: 1 | Status: SHIPPED | OUTPATIENT
Start: 2021-09-03 | End: 2022-03-10

## 2021-09-03 NOTE — TELEPHONE ENCOUNTER
Pt in need of refill  Gil Seaman, are you willing to fill on behalf of Fina Owens (given the holiday weekend?) Thank you

## 2021-09-14 ENCOUNTER — HOSPITAL ENCOUNTER (OUTPATIENT)
Dept: MAMMOGRAPHY | Facility: CLINIC | Age: 76
Discharge: HOME | End: 2021-09-14
Payer: COMMERCIAL

## 2021-09-14 ENCOUNTER — TELEPHONE (OUTPATIENT)
Dept: NEUROLOGY | Facility: CLINIC | Age: 76
End: 2021-09-14

## 2021-09-14 DIAGNOSIS — Z12.31 SCREENING MAMMOGRAM, ENCOUNTER FOR: ICD-10-CM

## 2021-09-14 PROCEDURE — 77063 BREAST TOMOSYNTHESIS BI: CPT

## 2021-09-14 PROCEDURE — 77067 SCR MAMMO BI INCL CAD: CPT

## 2021-09-14 NOTE — TELEPHONE ENCOUNTER
Patient/ made aware  Advised her to let us know if she would continue to have same sxs despite sinemet change

## 2021-09-14 NOTE — TELEPHONE ENCOUNTER
Patient/ calling to report that the patient's been having worsening sxs since changing to sinemet 25/250 about 3 weeks ago  Patient almost tearful on the phone  She's currently taking sinemet 25/250 one tab QID  Patient c/o worsening hallucinations and confusion  She reports that she wondered outside into the road recently, she's never done anything like that before  neupro patch 4 mg daily  exelon patch 9 5 mg daily  lexapro 20 mg in the AM    Please advise

## 2021-09-17 ENCOUNTER — HOSPITAL ENCOUNTER (OUTPATIENT)
Dept: MAMMOGRAPHY | Facility: CLINIC | Age: 76
Discharge: HOME | End: 2021-09-17
Payer: COMMERCIAL

## 2021-09-17 ENCOUNTER — HOSPITAL ENCOUNTER (OUTPATIENT)
Dept: ULTRASOUND IMAGING | Facility: CLINIC | Age: 76
Discharge: HOME | End: 2021-09-17
Payer: COMMERCIAL

## 2021-09-17 VITALS — WEIGHT: 160 LBS | HEIGHT: 63 IN | BODY MASS INDEX: 28.35 KG/M2

## 2021-09-17 DIAGNOSIS — R92.8 ABNORMAL MAMMOGRAM: ICD-10-CM

## 2021-09-17 PROCEDURE — G0279 TOMOSYNTHESIS, MAMMO: HCPCS

## 2021-09-17 PROCEDURE — 76642 ULTRASOUND BREAST LIMITED: CPT

## 2021-09-17 PROCEDURE — 77065 DX MAMMO INCL CAD UNI: CPT

## 2021-09-20 ENCOUNTER — SOCIAL WORK (OUTPATIENT)
Dept: BEHAVIORAL/MENTAL HEALTH CLINIC | Age: 76
End: 2021-09-20
Payer: COMMERCIAL

## 2021-09-20 DIAGNOSIS — F33.41 MDD (MAJOR DEPRESSIVE DISORDER), RECURRENT, IN PARTIAL REMISSION (HCC): Primary | ICD-10-CM

## 2021-09-20 PROCEDURE — 90834 PSYTX W PT 45 MINUTES: CPT | Performed by: SOCIAL WORKER
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
## 2021-10-02 ENCOUNTER — APPOINTMENT (EMERGENCY)
Dept: RADIOLOGY | Facility: HOSPITAL | Age: 76
End: 2021-10-02
Payer: COMMERCIAL

## 2021-10-02 ENCOUNTER — HOSPITAL ENCOUNTER (EMERGENCY)
Facility: HOSPITAL | Age: 76
Discharge: HOME | End: 2021-10-02
Attending: EMERGENCY MEDICINE
Payer: COMMERCIAL

## 2021-10-02 VITALS
TEMPERATURE: 97.5 F | RESPIRATION RATE: 16 BRPM | DIASTOLIC BLOOD PRESSURE: 86 MMHG | HEART RATE: 68 BPM | SYSTOLIC BLOOD PRESSURE: 160 MMHG | OXYGEN SATURATION: 98 %

## 2021-10-02 DIAGNOSIS — R05.9 COUGH: Primary | ICD-10-CM

## 2021-10-02 DIAGNOSIS — J30.9 ALLERGIC RHINITIS: ICD-10-CM

## 2021-10-02 LAB
ALBUMIN SERPL BCP-MCNC: 3.3 G/DL (ref 3.5–5)
ALP SERPL-CCNC: 89 U/L (ref 46–116)
ALT SERPL W P-5'-P-CCNC: 19 U/L (ref 12–78)
ANION GAP SERPL CALCULATED.3IONS-SCNC: 8 MMOL/L (ref 4–13)
AST SERPL W P-5'-P-CCNC: 17 U/L (ref 5–45)
ATRIAL RATE: 66 BPM
BASOPHILS # BLD AUTO: 0.07 THOUSANDS/ΜL (ref 0–0.1)
BASOPHILS NFR BLD AUTO: 1 % (ref 0–1)
BILIRUB SERPL-MCNC: 0.35 MG/DL (ref 0.2–1)
BUN SERPL-MCNC: 16 MG/DL (ref 5–25)
CALCIUM ALBUM COR SERPL-MCNC: 9 MG/DL (ref 8.3–10.1)
CALCIUM SERPL-MCNC: 8.4 MG/DL (ref 8.3–10.1)
CHLORIDE SERPL-SCNC: 105 MMOL/L (ref 100–108)
CO2 SERPL-SCNC: 28 MMOL/L (ref 21–32)
CREAT SERPL-MCNC: 0.78 MG/DL (ref 0.6–1.3)
EOSINOPHIL # BLD AUTO: 0.59 THOUSAND/ΜL (ref 0–0.61)
EOSINOPHIL NFR BLD AUTO: 9 % (ref 0–6)
ERYTHROCYTE [DISTWIDTH] IN BLOOD BY AUTOMATED COUNT: 13.8 % (ref 11.6–15.1)
GFR SERPL CREATININE-BSD FRML MDRD: 75 ML/MIN/1.73SQ M
GLUCOSE SERPL-MCNC: 102 MG/DL (ref 65–140)
HCT VFR BLD AUTO: 43.8 % (ref 34.8–46.1)
HGB BLD-MCNC: 14.6 G/DL (ref 11.5–15.4)
IMM GRANULOCYTES # BLD AUTO: 0.05 THOUSAND/UL (ref 0–0.2)
IMM GRANULOCYTES NFR BLD AUTO: 1 % (ref 0–2)
LYMPHOCYTES # BLD AUTO: 1.41 THOUSANDS/ΜL (ref 0.6–4.47)
LYMPHOCYTES NFR BLD AUTO: 22 % (ref 14–44)
MCH RBC QN AUTO: 30 PG (ref 26.8–34.3)
MCHC RBC AUTO-ENTMCNC: 33.3 G/DL (ref 31.4–37.4)
MCV RBC AUTO: 90 FL (ref 82–98)
MONOCYTES # BLD AUTO: 0.67 THOUSAND/ΜL (ref 0.17–1.22)
MONOCYTES NFR BLD AUTO: 10 % (ref 4–12)
NEUTROPHILS # BLD AUTO: 3.7 THOUSANDS/ΜL (ref 1.85–7.62)
NEUTS SEG NFR BLD AUTO: 57 % (ref 43–75)
NRBC BLD AUTO-RTO: 0 /100 WBCS
NT-PROBNP SERPL-MCNC: 115 PG/ML
P AXIS: 41 DEGREES
PLATELET # BLD AUTO: 213 THOUSANDS/UL (ref 149–390)
PMV BLD AUTO: 10.1 FL (ref 8.9–12.7)
POTASSIUM SERPL-SCNC: 3.7 MMOL/L (ref 3.5–5.3)
PR INTERVAL: 178 MS
PROT SERPL-MCNC: 6.6 G/DL (ref 6.4–8.2)
QRS AXIS: -24 DEGREES
QRSD INTERVAL: 94 MS
QT INTERVAL: 418 MS
QTC INTERVAL: 438 MS
RBC # BLD AUTO: 4.86 MILLION/UL (ref 3.81–5.12)
SARS-COV-2 RNA RESP QL NAA+PROBE: NEGATIVE
SODIUM SERPL-SCNC: 141 MMOL/L (ref 136–145)
T WAVE AXIS: 8 DEGREES
TROPONIN I SERPL-MCNC: <0.02 NG/ML
VENTRICULAR RATE: 66 BPM
WBC # BLD AUTO: 6.49 THOUSAND/UL (ref 4.31–10.16)

## 2021-10-02 PROCEDURE — U0005 INFEC AGEN DETEC AMPLI PROBE: HCPCS | Performed by: PHYSICIAN ASSISTANT

## 2021-10-02 PROCEDURE — 83880 ASSAY OF NATRIURETIC PEPTIDE: CPT | Performed by: PHYSICIAN ASSISTANT

## 2021-10-02 PROCEDURE — 71046 X-RAY EXAM CHEST 2 VIEWS: CPT

## 2021-10-02 PROCEDURE — 93005 ELECTROCARDIOGRAM TRACING: CPT

## 2021-10-02 PROCEDURE — 93010 ELECTROCARDIOGRAM REPORT: CPT | Performed by: INTERNAL MEDICINE

## 2021-10-02 PROCEDURE — 99285 EMERGENCY DEPT VISIT HI MDM: CPT | Performed by: PHYSICIAN ASSISTANT

## 2021-10-02 PROCEDURE — 80053 COMPREHEN METABOLIC PANEL: CPT | Performed by: PHYSICIAN ASSISTANT

## 2021-10-02 PROCEDURE — 84484 ASSAY OF TROPONIN QUANT: CPT | Performed by: PHYSICIAN ASSISTANT

## 2021-10-02 PROCEDURE — U0003 INFECTIOUS AGENT DETECTION BY NUCLEIC ACID (DNA OR RNA); SEVERE ACUTE RESPIRATORY SYNDROME CORONAVIRUS 2 (SARS-COV-2) (CORONAVIRUS DISEASE [COVID-19]), AMPLIFIED PROBE TECHNIQUE, MAKING USE OF HIGH THROUGHPUT TECHNOLOGIES AS DESCRIBED BY CMS-2020-01-R: HCPCS | Performed by: PHYSICIAN ASSISTANT

## 2021-10-02 PROCEDURE — 99284 EMERGENCY DEPT VISIT MOD MDM: CPT

## 2021-10-02 PROCEDURE — 85025 COMPLETE CBC W/AUTO DIFF WBC: CPT | Performed by: PHYSICIAN ASSISTANT

## 2021-10-02 PROCEDURE — 36415 COLL VENOUS BLD VENIPUNCTURE: CPT | Performed by: PHYSICIAN ASSISTANT

## 2021-10-02 RX ORDER — FLUTICASONE PROPIONATE 50 MCG
1 SPRAY, SUSPENSION (ML) NASAL DAILY
Qty: 16 G | Refills: 0 | Status: SHIPPED | OUTPATIENT
Start: 2021-10-02 | End: 2022-02-08 | Stop reason: ALTCHOICE

## 2021-10-14 ENCOUNTER — TELEPHONE (OUTPATIENT)
Dept: INTERNAL MEDICINE CLINIC | Facility: CLINIC | Age: 76
End: 2021-10-14

## 2021-10-28 ENCOUNTER — TELEPHONE (OUTPATIENT)
Dept: INTERNAL MEDICINE CLINIC | Facility: CLINIC | Age: 76
End: 2021-10-28

## 2021-10-28 NOTE — TELEPHONE ENCOUNTER
FYI - Patient has an appt  w/Kenna Lovelace 11/02/21, said she is very stressed (she sounded very stressed), so called to get a sooner appt    There are no sooner appt's here  Patient said she needs a sooner appt  Wanted to know what Padmini Tinoco can do for her  Armstrong Brittney / [phone] (BRYANT)

## 2021-11-02 ENCOUNTER — SOCIAL WORK (OUTPATIENT)
Dept: BEHAVIORAL/MENTAL HEALTH CLINIC | Age: 76
End: 2021-11-02
Payer: COMMERCIAL

## 2021-11-02 DIAGNOSIS — F33.41 MDD (MAJOR DEPRESSIVE DISORDER), RECURRENT, IN PARTIAL REMISSION (HCC): Primary | ICD-10-CM

## 2021-11-02 PROCEDURE — 90834 PSYTX W PT 45 MINUTES: CPT | Performed by: SOCIAL WORKER

## 2021-11-23 ENCOUNTER — SOCIAL WORK (OUTPATIENT)
Dept: BEHAVIORAL/MENTAL HEALTH CLINIC | Age: 76
End: 2021-11-23
Payer: COMMERCIAL

## 2021-11-23 DIAGNOSIS — F33.41 MDD (MAJOR DEPRESSIVE DISORDER), RECURRENT, IN PARTIAL REMISSION (HCC): Primary | ICD-10-CM

## 2021-11-23 DIAGNOSIS — F41.1 GAD (GENERALIZED ANXIETY DISORDER): ICD-10-CM

## 2021-11-23 PROCEDURE — 90834 PSYTX W PT 45 MINUTES: CPT | Performed by: SOCIAL WORKER

## 2021-12-01 ENCOUNTER — OFFICE VISIT (OUTPATIENT)
Dept: NEUROLOGY | Facility: CLINIC | Age: 76
End: 2021-12-01
Payer: COMMERCIAL

## 2021-12-01 VITALS
WEIGHT: 152 LBS | SYSTOLIC BLOOD PRESSURE: 130 MMHG | BODY MASS INDEX: 26.93 KG/M2 | DIASTOLIC BLOOD PRESSURE: 70 MMHG | HEIGHT: 63 IN | HEART RATE: 80 BPM | TEMPERATURE: 96.7 F

## 2021-12-01 DIAGNOSIS — G20 PARKINSON'S DISEASE (HCC): Primary | ICD-10-CM

## 2021-12-01 PROCEDURE — 99214 OFFICE O/P EST MOD 30 MIN: CPT | Performed by: PHYSICIAN ASSISTANT

## 2021-12-01 RX ORDER — RIVASTIGMINE TARTRATE 4.5 MG/1
4.5 CAPSULE ORAL 2 TIMES DAILY
Qty: 180 CAPSULE | Refills: 3 | Status: SHIPPED | OUTPATIENT
Start: 2021-12-01 | End: 2022-02-08 | Stop reason: ALTCHOICE

## 2021-12-01 RX ORDER — RIVASTIGMINE TARTRATE 4.5 MG/1
4.5 CAPSULE ORAL 2 TIMES DAILY
Qty: 180 CAPSULE | Refills: 3 | Status: SHIPPED | OUTPATIENT
Start: 2021-12-01 | End: 2021-12-01 | Stop reason: SDUPTHER

## 2021-12-02 ENCOUNTER — TELEPHONE (OUTPATIENT)
Dept: NEUROLOGY | Facility: CLINIC | Age: 76
End: 2021-12-02

## 2021-12-03 ENCOUNTER — TELEPHONE (OUTPATIENT)
Dept: NEUROLOGY | Facility: CLINIC | Age: 76
End: 2021-12-03

## 2021-12-06 ENCOUNTER — TELEPHONE (OUTPATIENT)
Dept: INTERNAL MEDICINE CLINIC | Facility: CLINIC | Age: 76
End: 2021-12-06

## 2021-12-07 DIAGNOSIS — G20 PARKINSON'S DISEASE (HCC): Primary | ICD-10-CM

## 2021-12-07 RX ORDER — DONEPEZIL HYDROCHLORIDE 5 MG/1
5 TABLET, FILM COATED ORAL
Qty: 30 TABLET | Refills: 0
Start: 2021-12-07 | End: 2022-02-08 | Stop reason: ALTCHOICE

## 2021-12-07 RX ORDER — DONEPEZIL HYDROCHLORIDE 10 MG/1
10 TABLET, FILM COATED ORAL
Qty: 90 TABLET | Refills: 0
Start: 2021-12-07 | End: 2022-02-01 | Stop reason: SDUPTHER

## 2021-12-08 ENCOUNTER — TELEPHONE (OUTPATIENT)
Dept: NEUROLOGY | Facility: CLINIC | Age: 76
End: 2021-12-08

## 2021-12-17 ENCOUNTER — TELEPHONE (OUTPATIENT)
Dept: INTERNAL MEDICINE CLINIC | Facility: CLINIC | Age: 76
End: 2021-12-17

## 2021-12-17 NOTE — TELEPHONE ENCOUNTER
Jonathan Madera from [place] [id number] called stating to cancel the appointment for [facility] on 12/30/21 with Dr Adina Diaz  Patient will be seeing the doctor that is assigned to [place] [id number]

## 2021-12-21 ENCOUNTER — SOCIAL WORK (OUTPATIENT)
Dept: BEHAVIORAL/MENTAL HEALTH CLINIC | Age: 76
End: 2021-12-21
Payer: COMMERCIAL

## 2021-12-21 DIAGNOSIS — F33.41 MDD (MAJOR DEPRESSIVE DISORDER), RECURRENT, IN PARTIAL REMISSION (HCC): Primary | ICD-10-CM

## 2021-12-21 PROCEDURE — 90834 PSYTX W PT 45 MINUTES: CPT | Performed by: SOCIAL WORKER

## 2021-12-30 ENCOUNTER — OFFICE VISIT (OUTPATIENT)
Dept: INTERNAL MEDICINE CLINIC | Facility: CLINIC | Age: 76
End: 2021-12-30
Payer: COMMERCIAL

## 2021-12-30 VITALS
SYSTOLIC BLOOD PRESSURE: 104 MMHG | WEIGHT: 150 LBS | HEIGHT: 63 IN | TEMPERATURE: 98.9 F | DIASTOLIC BLOOD PRESSURE: 76 MMHG | HEART RATE: 81 BPM | OXYGEN SATURATION: 96 % | BODY MASS INDEX: 26.58 KG/M2

## 2021-12-30 DIAGNOSIS — R07.89 OTHER CHEST PAIN: ICD-10-CM

## 2021-12-30 DIAGNOSIS — F33.41 RECURRENT MAJOR DEPRESSIVE DISORDER, IN PARTIAL REMISSION (HCC): ICD-10-CM

## 2021-12-30 DIAGNOSIS — E78.2 MIXED HYPERLIPIDEMIA: ICD-10-CM

## 2021-12-30 DIAGNOSIS — E03.9 ACQUIRED HYPOTHYROIDISM: Primary | ICD-10-CM

## 2021-12-30 DIAGNOSIS — E55.9 VITAMIN D DEFICIENCY: ICD-10-CM

## 2021-12-30 DIAGNOSIS — R53.83 FATIGUE, UNSPECIFIED TYPE: ICD-10-CM

## 2021-12-30 DIAGNOSIS — N39.0 FREQUENT UTI: ICD-10-CM

## 2021-12-30 DIAGNOSIS — Z00.00 WELL ADULT EXAM: ICD-10-CM

## 2021-12-30 DIAGNOSIS — E53.8 B12 DEFICIENCY: ICD-10-CM

## 2021-12-30 DIAGNOSIS — G20 PARKINSON'S DISEASE (HCC): ICD-10-CM

## 2021-12-30 DIAGNOSIS — N39.46 MIXED STRESS AND URGE URINARY INCONTINENCE: ICD-10-CM

## 2021-12-30 DIAGNOSIS — M85.89 OSTEOPENIA OF MULTIPLE SITES: ICD-10-CM

## 2021-12-30 PROCEDURE — 99215 OFFICE O/P EST HI 40 MIN: CPT | Performed by: INTERNAL MEDICINE

## 2021-12-30 PROCEDURE — 3008F BODY MASS INDEX DOCD: CPT | Performed by: INTERNAL MEDICINE

## 2021-12-30 PROCEDURE — 1036F TOBACCO NON-USER: CPT | Performed by: INTERNAL MEDICINE

## 2021-12-30 PROCEDURE — 1160F RVW MEDS BY RX/DR IN RCRD: CPT | Performed by: INTERNAL MEDICINE

## 2022-01-14 ENCOUNTER — APPOINTMENT (OUTPATIENT)
Dept: LAB | Facility: CLINIC | Age: 77
End: 2022-01-14
Payer: COMMERCIAL

## 2022-01-14 DIAGNOSIS — R53.83 FATIGUE, UNSPECIFIED TYPE: ICD-10-CM

## 2022-01-14 DIAGNOSIS — E53.8 B12 DEFICIENCY: ICD-10-CM

## 2022-01-14 DIAGNOSIS — E78.2 MIXED HYPERLIPIDEMIA: ICD-10-CM

## 2022-01-14 DIAGNOSIS — Z00.00 WELL ADULT EXAM: ICD-10-CM

## 2022-01-14 DIAGNOSIS — E03.9 ACQUIRED HYPOTHYROIDISM: ICD-10-CM

## 2022-01-14 DIAGNOSIS — E55.9 VITAMIN D DEFICIENCY: ICD-10-CM

## 2022-01-14 LAB
25(OH)D3 SERPL-MCNC: 40.2 NG/ML (ref 30–100)
ALBUMIN SERPL BCP-MCNC: 3.6 G/DL (ref 3.5–5)
ALP SERPL-CCNC: 74 U/L (ref 46–116)
ALT SERPL W P-5'-P-CCNC: 10 U/L (ref 12–78)
ANION GAP SERPL CALCULATED.3IONS-SCNC: 5 MMOL/L (ref 4–13)
AST SERPL W P-5'-P-CCNC: 14 U/L (ref 5–45)
BASOPHILS # BLD AUTO: 0.08 THOUSANDS/ΜL (ref 0–0.1)
BASOPHILS NFR BLD AUTO: 1 % (ref 0–1)
BILIRUB SERPL-MCNC: 0.52 MG/DL (ref 0.2–1)
BUN SERPL-MCNC: 16 MG/DL (ref 5–25)
CALCIUM SERPL-MCNC: 9 MG/DL (ref 8.3–10.1)
CHLORIDE SERPL-SCNC: 105 MMOL/L (ref 100–108)
CHOLEST SERPL-MCNC: 206 MG/DL
CO2 SERPL-SCNC: 28 MMOL/L (ref 21–32)
CREAT SERPL-MCNC: 0.95 MG/DL (ref 0.6–1.3)
EOSINOPHIL # BLD AUTO: 0.35 THOUSAND/ΜL (ref 0–0.61)
EOSINOPHIL NFR BLD AUTO: 4 % (ref 0–6)
ERYTHROCYTE [DISTWIDTH] IN BLOOD BY AUTOMATED COUNT: 13.8 % (ref 11.6–15.1)
EST. AVERAGE GLUCOSE BLD GHB EST-MCNC: 117 MG/DL
FOLATE SERPL-MCNC: >20 NG/ML (ref 3.1–17.5)
GFR SERPL CREATININE-BSD FRML MDRD: 58 ML/MIN/1.73SQ M
GLUCOSE SERPL-MCNC: 89 MG/DL (ref 65–140)
HBA1C MFR BLD: 5.7 %
HCT VFR BLD AUTO: 45.3 % (ref 34.8–46.1)
HDLC SERPL-MCNC: 50 MG/DL
HGB BLD-MCNC: 14.8 G/DL (ref 11.5–15.4)
IMM GRANULOCYTES # BLD AUTO: 0.03 THOUSAND/UL (ref 0–0.2)
IMM GRANULOCYTES NFR BLD AUTO: 0 % (ref 0–2)
LDLC SERPL CALC-MCNC: 132 MG/DL (ref 0–100)
LYMPHOCYTES # BLD AUTO: 1.87 THOUSANDS/ΜL (ref 0.6–4.47)
LYMPHOCYTES NFR BLD AUTO: 21 % (ref 14–44)
MAGNESIUM SERPL-MCNC: 2.6 MG/DL (ref 1.6–2.6)
MCH RBC QN AUTO: 29.7 PG (ref 26.8–34.3)
MCHC RBC AUTO-ENTMCNC: 32.7 G/DL (ref 31.4–37.4)
MCV RBC AUTO: 91 FL (ref 82–98)
MONOCYTES # BLD AUTO: 0.58 THOUSAND/ΜL (ref 0.17–1.22)
MONOCYTES NFR BLD AUTO: 7 % (ref 4–12)
NEUTROPHILS # BLD AUTO: 5.85 THOUSANDS/ΜL (ref 1.85–7.62)
NEUTS SEG NFR BLD AUTO: 67 % (ref 43–75)
NONHDLC SERPL-MCNC: 156 MG/DL
NRBC BLD AUTO-RTO: 0 /100 WBCS
PHOSPHATE SERPL-MCNC: 3.4 MG/DL (ref 2.3–4.1)
PLATELET # BLD AUTO: 223 THOUSANDS/UL (ref 149–390)
PMV BLD AUTO: 11.2 FL (ref 8.9–12.7)
POTASSIUM SERPL-SCNC: 4.2 MMOL/L (ref 3.5–5.3)
PROT SERPL-MCNC: 6.9 G/DL (ref 6.4–8.2)
RBC # BLD AUTO: 4.98 MILLION/UL (ref 3.81–5.12)
SODIUM SERPL-SCNC: 138 MMOL/L (ref 136–145)
TRIGL SERPL-MCNC: 121 MG/DL
TSH SERPL DL<=0.05 MIU/L-ACNC: 1.58 UIU/ML (ref 0.36–3.74)
VIT B12 SERPL-MCNC: 332 PG/ML (ref 100–900)
WBC # BLD AUTO: 8.76 THOUSAND/UL (ref 4.31–10.16)

## 2022-01-14 PROCEDURE — 82607 VITAMIN B-12: CPT

## 2022-01-14 PROCEDURE — 80061 LIPID PANEL: CPT

## 2022-01-14 PROCEDURE — 83735 ASSAY OF MAGNESIUM: CPT

## 2022-01-14 PROCEDURE — 36415 COLL VENOUS BLD VENIPUNCTURE: CPT

## 2022-01-14 PROCEDURE — 84443 ASSAY THYROID STIM HORMONE: CPT

## 2022-01-14 PROCEDURE — 82306 VITAMIN D 25 HYDROXY: CPT

## 2022-01-14 PROCEDURE — 82746 ASSAY OF FOLIC ACID SERUM: CPT

## 2022-01-14 PROCEDURE — 83036 HEMOGLOBIN GLYCOSYLATED A1C: CPT

## 2022-01-14 PROCEDURE — 85025 COMPLETE CBC W/AUTO DIFF WBC: CPT

## 2022-01-14 PROCEDURE — 84100 ASSAY OF PHOSPHORUS: CPT

## 2022-01-14 PROCEDURE — 80053 COMPREHEN METABOLIC PANEL: CPT

## 2022-01-17 ENCOUNTER — SOCIAL WORK (OUTPATIENT)
Dept: BEHAVIORAL/MENTAL HEALTH CLINIC | Age: 77
End: 2022-01-17
Payer: COMMERCIAL

## 2022-01-17 DIAGNOSIS — F33.41 RECURRENT MAJOR DEPRESSIVE DISORDER, IN PARTIAL REMISSION (HCC): Primary | ICD-10-CM

## 2022-01-17 PROCEDURE — 90834 PSYTX W PT 45 MINUTES: CPT | Performed by: SOCIAL WORKER

## 2022-01-17 NOTE — PSYCH
Start Time 12:50PM-1:35PM  Psychotherapy Provided: Individual Psychotherapy 45 minutes   D:  Coby Castellanos is in a relationship where she is confused about her partner  She is appropriately dressed in a casual manner, and adequately groomed  She is in a wheelchair  Her mood is dysthymic and affect cannot be assessed due to Covid masking  Thought process is confused and scattered  Speech is rambling but normal volume  She does not stay in the here and now when she talks about her adult children  She claims her kids visit her once a week which is not true  They do not visit her regularly  Her partner comes to visit her daily  She does state that she is busy and does not have time for her children  She says her daughter Radha Celestin told her that this is the best part of their relationship but Coby Castellanos thinks that it will get worse when her daughter is depressed again  She spoke of her first  and the father of all of her children  She states that she made some friends at the place she is residing  She described her relationship with each of her children other than the one who is   She states that her daughter runs hot and cold with visiting her  Her oldest is what she described as easy going and he calls her at least weekly  She is frustrated that she does not see her grandchildren very often either  She described her relationship with her partners children  She likes that there is entertainment in the facility she is living at  She does have Parkinson's and it appears to be that the dementia is increasing as evidenced by her inability to stay on topic multiple times through the session  She was reminded that her partner comes to see her because he loves her and she was encouraged to focus on that  A:  Coby Castellanos appears to be confused and focuses on negative or imagined case scenarios  P: Length of time in session: 45 minutes, follow up in 3/4 weeks    week    No diagnosis found    MDD in partial remission    Goals addressed in session: Goal 1     Pain:      none    0    Current suicide risk : Low   Farina is living in a supervised environment  She is confused, and at times forgets  Behavioral Health Treatment Plan [facility]: Diagnosis and Treatment Plan explained to Grace Davis relates understanding diagnosis and is agreeable to Treatment Plan   Yes

## 2022-01-20 ENCOUNTER — HOSPITAL ENCOUNTER (OUTPATIENT)
Dept: NON INVASIVE DIAGNOSTICS | Facility: CLINIC | Age: 77
Discharge: HOME | End: 2022-01-20
Payer: COMMERCIAL

## 2022-01-20 VITALS
SYSTOLIC BLOOD PRESSURE: 114 MMHG | WEIGHT: 150 LBS | DIASTOLIC BLOOD PRESSURE: 86 MMHG | OXYGEN SATURATION: 97 % | HEIGHT: 63 IN | BODY MASS INDEX: 26.58 KG/M2 | HEART RATE: 69 BPM

## 2022-01-20 DIAGNOSIS — R07.89 OTHER CHEST PAIN: ICD-10-CM

## 2022-01-20 LAB
NUC STRESS DIASTOLIC VOLUME INDEX: 0 ML/M2
NUC STRESS EJECTION FRACTION: 0 %
NUC STRESS SYSTOLIC VOLUME INDEX: 0 ML/M2
RATE PRESSURE PRODUCT: 6688
SL CV REST NUCLEAR ISOTOPE DOSE: 10.23 MCI
SL CV STRESS NUCLEAR ISOTOPE DOSE: 32.1 MCI
SL CV STRESS RECOVERY BP: NORMAL MMHG
SL CV STRESS RECOVERY HR: 85 BPM
STRESS ANGINA INDEX: 0
STRESS BASELINE BP: NORMAL MMHG
STRESS BASELINE HR: 69 BPM
STRESS O2 SAT REST: 97 %
STRESS PEAK HR: 88 BPM
STRESS POST O2 SAT PEAK: 93 %
STRESS POST PEAK BP: 76 MMHG
STRESS/REST PERFUSION RATIO: 0

## 2022-01-20 PROCEDURE — 78452 HT MUSCLE IMAGE SPECT MULT: CPT | Performed by: INTERNAL MEDICINE

## 2022-01-20 PROCEDURE — A9502 TC99M TETROFOSMIN: HCPCS

## 2022-01-20 PROCEDURE — 93018 CV STRESS TEST I&R ONLY: CPT | Performed by: INTERNAL MEDICINE

## 2022-01-20 PROCEDURE — 93017 CV STRESS TEST TRACING ONLY: CPT

## 2022-01-20 PROCEDURE — 78452 HT MUSCLE IMAGE SPECT MULT: CPT

## 2022-01-20 PROCEDURE — 93016 CV STRESS TEST SUPVJ ONLY: CPT | Performed by: INTERNAL MEDICINE

## 2022-01-20 PROCEDURE — G1004 CDSM NDSC: HCPCS

## 2022-01-20 RX ADMIN — REGADENOSON 0.4 MG: 0.08 INJECTION, SOLUTION INTRAVENOUS at 13:25

## 2022-01-21 ENCOUNTER — TELEPHONE (OUTPATIENT)
Dept: INTERNAL MEDICINE CLINIC | Facility: CLINIC | Age: 77
End: 2022-01-21

## 2022-01-21 LAB
MAX DIASTOLIC BP: 86 MMHG
MAX HEART RATE: 88 BPM
MAX PREDICTED HEART RATE: 145 BPM
MAX. SYSTOLIC BP: 114 MMHG
PROTOCOL NAME: NORMAL
REASON FOR TERMINATION: NORMAL
TARGET HR FORMULA: NORMAL
TEST INDICATION: NORMAL
TIME IN EXERCISE PHASE: NORMAL

## 2022-01-21 NOTE — TELEPHONE ENCOUNTER
----- Message from Katherin Mcgrath MD sent at 1/20/2022  5:46 PM EST -----  Let her know there were some subtle abnormalities on her stress test for which I would like her to see a cardiologist   I ordered a Cardiology consult

## 2022-01-28 ENCOUNTER — TELEPHONE (OUTPATIENT)
Dept: NEUROLOGY | Facility: CLINIC | Age: 77
End: 2022-01-28

## 2022-01-28 NOTE — TELEPHONE ENCOUNTER
Spoke with the daughter this afternoon  Reviewed the medications  She has had hallucinations and she is now struggling with delusions  She will call family thinking that she needs a ride home  Daughter is often able to reorient her  Overall she has had a decline in the past 6 weeks with more confusion and difficulty with reality  Because of her continued issues with hallucinations and delusions I would first recommend coming off of the Neupro patch completely and if still no benefit then would consider a trial of Namenda or Nuplazid/Seroquel  She would like to take some time to speak with the rest of the family and call back with a decision

## 2022-01-28 NOTE — TELEPHONE ENCOUNTER
Per chart review, it look like pt should be taking  neurpro 2mg patch  aricept 5mg then increase to 10mg daily  sinemet 2 tabs qid  lexapro 20mg daily    called alexander and she states that the facility is giving pt  sinemet 2 tabs qid  lexapro 20mg daily  neupro 2mg patch    donepezil was not on the facilty list   per chart this was set to no print  see encounter from 12/3    she states that right now pt seems to not be doing well, states that she is both in reality and not in realality at the same time  callled alexander at midnight last night knew she was at [facility] but then thought she was at home  speaking sentences but not making sense  a lot of confusion  feels like she has declined a lot with in the last 6 weeks and states that it has been absolutly shocking  states pt was moved to memory floor a few days ago  She then states that she is getting another call from our office  She answered it and it was [place]    She will speak to [facility]

## 2022-01-28 NOTE — TELEPHONE ENCOUNTER
Patient's daughter Glenny [facility]) calling in, she is patient's POA  She has questions regarding the medications the patient is supposed to be on  She states she sees records on the patient's MyChart of her medications but the list of medications that the facility gave her does not match  She is aware that some things were changed along the way due to allergy or intolerance   Patient would like a clearer understanding of what medications patient should currently be on for her PD    Please advise     Cb#: [phone]

## 2022-02-01 DIAGNOSIS — G20 PARKINSON'S DISEASE (HCC): ICD-10-CM

## 2022-02-01 RX ORDER — DONEPEZIL HYDROCHLORIDE 10 MG/1
10 TABLET, FILM COATED ORAL
Qty: 90 TABLET | Refills: 1
Start: 2022-02-01 | End: 2022-02-08 | Stop reason: ALTCHOICE

## 2022-02-01 NOTE — TELEPHONE ENCOUNTER
Ariel Ramirez from [facility] calling to ask that script for Aricept 10 mg be sent to [facility]  Pt taking 1 tab daily at [facility]  Freddie Marquis - Rx entered  Please review and sign if in agreement

## 2022-02-07 ENCOUNTER — SOCIAL WORK (OUTPATIENT)
Dept: BEHAVIORAL/MENTAL HEALTH CLINIC | Age: 77
End: 2022-02-07
Payer: COMMERCIAL

## 2022-02-07 DIAGNOSIS — F41.9 ANXIETY: ICD-10-CM

## 2022-02-07 DIAGNOSIS — E66.3 OVERWEIGHT: ICD-10-CM

## 2022-02-07 DIAGNOSIS — F33.41 RECURRENT MAJOR DEPRESSIVE DISORDER, IN PARTIAL REMISSION (HCC): Primary | ICD-10-CM

## 2022-02-07 PROCEDURE — 90834 PSYTX W PT 45 MINUTES: CPT | Performed by: SOCIAL WORKER

## 2022-02-07 NOTE — PSYCH
Start time 1:00PM-1:45PM  Psychotherapy Provided: Individual Psychotherapy 45 minutes   Length of time in session: 45 minutes, follow up in 3  weeks    No diagnosis found  Goals addressed in session: Goal 1   Pain:      none  0  Current suicide risk : Low     D: Clarita attended the session with her life partner  She was moved to the dementia floor over at [facility]  She made friends on the 2nd floor and now she is on the 3rd floor and states she is on lock down all the time  There are about 42 people on the unit with 15 being in hospice  Petar described the chaos on the floor  Caro Goldstein reports being frightened as she states people try to come into her room, calling it their room  She states that it has been difficult to make friends  Petar is there everyday from 9-9:30-7 daily  She cannot go to the other floor to see people unless a staff is with her and they are understaffed  Caro Goldstein is appropriately dressed in a casual manner, wearing glasses, and being in a wheelchair and well groomed  Her thought process is forgetful and her affect cannot be assessed due to Covid masking  Her mood is confused/dysthymic and her speech is normal rate and volume  She states that people do not come to visit her very often including her children  Her son had back surgery and her daughter had a disagreement with Petar and will not come to visit  Caro Goldstein worries about her 37year old daughter who has not worked in quite some time  She feels she needs more freedom to walk around  Petar described the unit as a "shock"  He states he feels like he is seeing people waiting to die  She states that will be her in a few weeks  Caro Goldstein feels like she is going to die there, friendless  He does not feel like she belongs on the floor she is on, they moved her due to them finding her on the stairs stating it was for her own safety  Caro Goldstein does not want to live like this anymore    She states she would never commit suicide as Roberto Bone' son committed suicide and she does not want to do that to those who love her  She wants get out of [facility]  A  While Beatriz Marcano does not want to live on a locked unit, she also denies any plan or intent with her feelings of not wanting to live  She has a great support in her partner  P: Follow up one week  Behavioral Health Treatment Plan [facility]: Diagnosis and Treatment Plan explained to Angle Babin relates understanding diagnosis and is agreeable to Treatment Plan   Yes

## 2022-02-08 ENCOUNTER — CONSULT (OUTPATIENT)
Dept: CARDIOLOGY CLINIC | Facility: CLINIC | Age: 77
End: 2022-02-08
Payer: COMMERCIAL

## 2022-02-08 ENCOUNTER — PREP FOR PROCEDURE (OUTPATIENT)
Dept: CARDIOLOGY CLINIC | Facility: CLINIC | Age: 77
End: 2022-02-08

## 2022-02-08 ENCOUNTER — TELEPHONE (OUTPATIENT)
Dept: CARDIOLOGY CLINIC | Facility: CLINIC | Age: 77
End: 2022-02-08

## 2022-02-08 VITALS
BODY MASS INDEX: 26.05 KG/M2 | HEIGHT: 63 IN | HEART RATE: 80 BPM | WEIGHT: 147 LBS | RESPIRATION RATE: 16 BRPM | SYSTOLIC BLOOD PRESSURE: 115 MMHG | OXYGEN SATURATION: 98 % | DIASTOLIC BLOOD PRESSURE: 72 MMHG

## 2022-02-08 DIAGNOSIS — R06.02 SHORTNESS OF BREATH ON EXERTION: ICD-10-CM

## 2022-02-08 DIAGNOSIS — R94.39 ABNORMAL CARDIOVASCULAR STRESS TEST: Primary | ICD-10-CM

## 2022-02-08 DIAGNOSIS — R07.89 OTHER CHEST PAIN: ICD-10-CM

## 2022-02-08 DIAGNOSIS — E78.2 MIXED HYPERLIPIDEMIA: ICD-10-CM

## 2022-02-08 PROCEDURE — 99204 OFFICE O/P NEW MOD 45 MIN: CPT | Performed by: INTERNAL MEDICINE

## 2022-02-08 PROCEDURE — 1036F TOBACCO NON-USER: CPT | Performed by: INTERNAL MEDICINE

## 2022-02-08 RX ORDER — ASPIRIN 325 MG
325 TABLET, DELAYED RELEASE (ENTERIC COATED) ORAL DAILY
Qty: 30 TABLET | Refills: 0 | Status: CANCELLED
Start: 2022-02-08

## 2022-02-08 NOTE — TELEPHONE ENCOUNTER
----- Message from Nidia Siddiqi MD sent at 2/8/2022 11:12 AM EST -----  Needs cardiac catheterization    Abnormal stress test   x

## 2022-02-08 NOTE — TELEPHONE ENCOUNTER
S/w patient's caregiver, Vane Murphy, prescreening completed  Aware BW is needed prior and [facility] lab confirmed  No medication holds needed  Vane Murphy stated that he will  cath prep/info sheet from [facility] office  Can we please have auth for [facility] on 2/18/22?

## 2022-02-08 NOTE — PROGRESS NOTES
[address] [facility]  [address], [address], [address]  Tel: [phone]      NAME: Ozzy Partida  AGE: 77 y o  SEX: female  : 1945   MRN: [id number]      Chief Complaint:  Chief Complaint   Patient presents with    New Patient Visit       History of Present Illness:   Ref by Dr Lyndsay Jean    67-year-old female with Parkinson's disease, dyslipidemia (not on a statin due to prior statin induced hepatitis) who states that for the last 1 year she has been getting chest pains and shortness of breath  States the pain is usually later in the evening when she is going to bed  Is in mid chest, mild-to-moderate in intensity, getting worse recently, no radiation  She uses a walker at home and she is not sure if her Parkinson's is getting worse or otherwise but she has started feeling more short of breath than her usual to get from A to B  Not necessarily associated with chest pain at that time  Pt denies palpitations, lightheadedness, syncope, swelling feet, orthopnea, PND, claudication        Past Medical History:  Past Medical History:   Diagnosis Date    Anxiety     BRCA1 negative     BRCA2 negative     Disease of thyroid gland     Generalized anxiety disorder 2015    Hepatic disease     Infectious viral hepatitis     Liver disease     Osteoporosis     Overweight (BMI 25 0-29 9) 2020    Parkinson's disease ([facility] [address] )     Patient refuses to disclose advance directive information 2018    Overview:  Yes, Patient instructed to provide copy of advance directive for provider to review and to be scanned into Electronic Medical Record  Overview:  Yes, Patient instructed to provide copy of advance directive for provider to review and to be scanned into Electronic Medical Record    Rosacea     Senile osteoporosis 2003    Shortness of breath          Past Surgical History:  Past Surgical History:   Procedure Laterality Date    FOOT SURGERY Bilateral     HYSTERECTOMY      ME ESOPHAGOGASTRODUODENOSCOPY TRANSORAL DIAGNOSTIC N/A 9/5/2018    Procedure: EGD AND COLONOSCOPY;  Surgeon: Nataliya Espinoza MD;  Location: [facility];   Service: Gastroenterology    URETHRA SURGERY      laparoscopic urethral suspension for stress incontinence         Family History:  Family History   Problem Relation Age of Onset    Lung cancer Mother     Dementia Mother     Lung cancer Father     Cancer Father     Esophageal cancer Brother     Cancer Brother     Cavazos's esophagus Brother     Esophageal cancer Family     Heart disease Family     Stroke Family         syndrome    No Known Problems Daughter     No Known Problems Maternal Grandmother     Breast cancer Paternal Grandmother 36    No Known Problems Paternal Aunt     Breast cancer Other 39    BRCA1 Positive Other 39    BRCA2 Positive Other 39         Social History:  Social History     Socioeconomic History    Marital status: Significant Other     Spouse name: None    Number of children: None    Years of education: Master's Degree    Highest education level: None   Occupational History     Comment: Retired   Tobacco Use    Smoking status: Never Smoker    Smokeless tobacco: Never Used   Vaping Use    Vaping Use: Never used   Substance and Sexual Activity    Alcohol use: Not Currently     Alcohol/week: 1 0 standard drink     Types: 1 Glasses of wine per week     Comment: Alcohol use per [employer]    Drug use: No    Sexual activity: Never   Other Topics Concern    None   Social History Narrative    Denied:  History of caffeine use     Social Determinants of Health     Financial Resource Strain: Not on file   Food Insecurity: Not on file   Transportation Needs: Not on file   Physical Activity: Insufficiently Active    Days of Exercise per Week: 1 day    Minutes of Exercise per Session: 10 min   Stress: No Stress Concern Present    Feeling of Stress : Only a little   Social Connections: Not on file   Intimate Partner Violence: Not on file   Housing Stability: Not on file         Active Problems:  Patient Active Problem List   Diagnosis    Parkinson's disease ([facility] [address] )    Scoliosis    Low back pain    Acquired hypothyroidism    Elevated antinuclear antibody (RAS) level    Family hx-breast malignancy    Mixed hyperlipidemia    Rosacea    Trochanteric bursitis    Urinary incontinence    Vitamin D deficiency    Flank pain    Irritable bowel syndrome with both constipation and diarrhea    REM behavioral disorder    Anxiety    Recurrent major depressive disorder, in partial remission ([facility] [address] )    Overweight    Osteopenia of multiple sites    Frequent UTI         The following portions of the patient's history were reviewed and updated as appropriate: past medical history, past surgical history, past family history,  past social history, current medications, allergies and problem list       Review of Systems:  Ten system review of systems essentially negative other than what is mentioned in the HPI above    Vitals:  Vitals:    02/08/22 1013   BP: 115/72   Pulse: 80   Resp: 16   SpO2: 98%       Body mass index is 26 04 kg/m²  Weight (last 2 days)     Date/Time Weight    02/08/22 1013 66 7 (147)          Physical Examination:  General:  Examined on a wheelchair  Patient is not in acute distress  Awake, alert  Responding to commands  Head: Normocephalic  Atraumatic  Eyes: Both pupils normal sized, round and reactive to light  Nonicteric  ENT: Normal external ear canals  Neck: Supple  JVP not raised  Trachea central  No thyromegaly  Lungs: Bilateral bronchovascular breath sounds with no crackles or rhonchi  Chest wall: No tenderness  Cardiovascular: RRR  S1 and S2 normal  No murmur, rub or gallop  Gastrointestinal: Abdomen soft, nontender  No guarding or rigidity  Liver and spleen not palpable   Bowel sounds present  Neurologic: Patient is awake, alert  Responding to commands  Integumentary:  No skin rash  Lymphatic: No cervical lymphadenopathy  Back: Symmetric   No CVA tenderness  Extremities: No clubbing, cyanosis or edema      Laboratory Results:  CBC with diff:   Lab Results   Component Value Date    WBC 8 76 01/14/2022    RBC 4 98 01/14/2022    HGB 14 8 01/14/2022    HCT 45 3 01/14/2022    MCV 91 01/14/2022    MCH 29 7 01/14/2022    RDW 13 8 01/14/2022     01/14/2022       CMP:  Lab Results   Component Value Date    CREATININE 0 95 01/14/2022    BUN 16 01/14/2022    K 4 2 01/14/2022     01/14/2022    CO2 28 01/14/2022    ALKPHOS 74 01/14/2022    ALT 10 (L) 01/14/2022    AST 14 01/14/2022       Lab Results   Component Value Date    HGBA1C 5 7 (H) 01/14/2022    HGBA1C 5 6 01/06/2022    MG 2 6 01/14/2022    PHOS 3 4 01/14/2022       Lab Results   Component Value Date    TROPONINI <0 02 10/02/2021       Lipid Profile:   No results found for: CHOL  Lab Results   Component Value Date    HDL 50 01/14/2022    HDL 66 02/17/2021    HDL 63 06/11/2020     Lab Results   Component Value Date    LDLCALC 132 (H) 01/14/2022    LDLCALC 130 (H) 02/17/2021    LDLCALC 135 (H) 06/11/2020     Lab Results   Component Value Date    TRIG 121 01/14/2022    TRIG 110 02/17/2021    TRIG 114 06/11/2020       Medications:    Current Outpatient Medications:     Ascorbic Acid (vitamin C) 100 MG tablet, Take by mouth, Disp: , Rfl:     carbidopa-levodopa (SINEMET)  mg per tablet, Take 2 tablets by mouth 4 (four) times a day, Disp: 720 tablet, Rfl: 1    Cholecalciferol (VITAMIN D) 2000 units tablet, 1 daily, Disp: , Rfl:     Cranberry 1000 MG CAPS, Take 4,200 mg by mouth as needed , Disp: , Rfl:     escitalopram (LEXAPRO) 20 mg tablet, Take 1 tablet (20 mg total) by mouth daily, Disp: 90 tablet, Rfl: 1    Multiple Vitamin (MULTIVITAMINS PO), daily, Disp: , Rfl:     rotigotine (Neupro) 2 MG/24HR, Place 1 patch on the skin daily, Disp: 30 patch, Rfl: 5   levothyroxine 50 mcg tablet, Take 1 tablet (50 mcg total) by mouth daily, Disp: 90 tablet, Rfl: 0      Allergies: Allergies   Allergen Reactions    Statins Other (See Comments)     jaundice    Naproxen Hives    Simvastatin Other (See Comments)     Jaundice    Dust Mite Extract Other (See Comments)     Per pt states does not know the type of reaction    Pollen Extract Sneezing, Nasal Congestion, Cough and Itching         Assessment and Plan:  1  Abnormal cardiovascular stress test  2  Chest pain  3  Shortness of breath on exertion    Cardiac catheterization offered  Procedure including risks benefits alternatives discussed  Questions answered  Start aspirin 81 mg daily  Will not be able to take statin due to prior statin induced hepatitis     4  Mixed hyperlipidemia  Continue diet control  Her PCP closely monitors her blood work    Recommend aggressive risk factor modification and therapeutic lifestyle changes  Low-salt, low-calorie, low-fat, low-cholesterol diet with regular exercise and to optimize weight  I will defer the ordering and monitoring of necessity lab studies to you, but I am available and happy to review and manage any of the data at your request in the future  Discussed concepts of atherosclerosis, including signs and symptoms of cardiac disease  Previous studies were reviewed  Safety measures were reviewed  Questions were entertained and answered  Patient was advised to report any problems requiring medical attention  Follow-up with PCP and appropriate specialist and lab work as discussed  Return for follow up visit as scheduled or earlier, if needed  Thank you for allowing me to participate in the care and evaluation of your patient  Should you have any questions, please feel free to contact me        Pepe Estrada MD  4/8/9725,94:22 AM

## 2022-02-15 ENCOUNTER — SOCIAL WORK (OUTPATIENT)
Dept: BEHAVIORAL/MENTAL HEALTH CLINIC | Facility: CLINIC | Age: 77
End: 2022-02-15
Payer: COMMERCIAL

## 2022-02-15 DIAGNOSIS — F41.1 GAD (GENERALIZED ANXIETY DISORDER): Primary | ICD-10-CM

## 2022-02-15 DIAGNOSIS — F33.41 RECURRENT MAJOR DEPRESSIVE DISORDER, IN PARTIAL REMISSION (HCC): ICD-10-CM

## 2022-02-15 PROCEDURE — 90834 PSYTX W PT 45 MINUTES: CPT | Performed by: SOCIAL WORKER

## 2022-02-15 NOTE — PSYCH
Start Time 12:20PM-1:05PM  Psychotherapy Provided: Individual Psychotherapy 45 minutes   Length of time in session: 45 minutes, follow up in 2 weeks    No diagnosis found  Goals addressed in session: Goal 1   Pain:      none  0  Current suicide risk : Low   Magy Adams is confused this session, in contrast to last session  She spoke of her kids going [place] with their father  Then she spoke of wanting to cancel her heart catheritization and we discussed at length that the doctor ordered it as it is necessary  Petar did not attend the session  She spoke of Blanco Marty having a meeting on what she is allowed to do  She is cognitively aware that she needs stimulation for her brain which her son states she is on the dementia unit for her own safety  Keyonna Doherty is in a wheelchair, being appropriately dressed and groomed, wearing glasses  Her thought process is scattered and her speech is normal rate and volume  Her mood is dysthymic and affect cannot be assessed due to Covid masking  Keyonna Doherty was friendly and cooperative  She was diagnosed with Parkinson's about 12 years ago  She feels that the loss of her mobility is very important to her  Support was offered  Keyonna Doherty states that she has not seen her grandchildren in about 6 weeks  A  Last session  which was a week ago, Keyonna Doherty was far more lucid and in touch with reality for longer periods of time  Progress is unable to be assessed due to her depression being intense but her dementia getting in the way of treatment at times  Kingsley Rachel will keep her heart catheretization and follow up with this writer in 2 weeks  Behavioral Health Treatment Plan [facility]: Diagnosis and Treatment Plan explained to Ghislaien Cuadra relates understanding diagnosis and is agreeable to Treatment Plan   Yes

## 2022-02-16 NOTE — TELEPHONE ENCOUNTER
Called the number for this pear to katja  They informed me that her test was already approved and confirmation fax letter was sent already to our office

## 2022-02-28 ENCOUNTER — TELEPHONE (OUTPATIENT)
Dept: INTERNAL MEDICINE CLINIC | Facility: CLINIC | Age: 77
End: 2022-02-28

## 2022-03-01 ENCOUNTER — SOCIAL WORK (OUTPATIENT)
Dept: BEHAVIORAL/MENTAL HEALTH CLINIC | Age: 77
End: 2022-03-01
Payer: COMMERCIAL

## 2022-03-01 DIAGNOSIS — F33.41 RECURRENT MAJOR DEPRESSIVE DISORDER, IN PARTIAL REMISSION (HCC): Primary | ICD-10-CM

## 2022-03-01 PROCEDURE — 90834 PSYTX W PT 45 MINUTES: CPT | Performed by: SOCIAL WORKER

## 2022-03-01 NOTE — PSYCH
Start time 2:00PM-2;45PM  Psychotherapy Provided: Individual Psychotherapy 45 minutes   Length of time in session: 45 minutes, follow up in 3-4  weeks    No diagnosis found  Therapy for anxiety and depression  Goals addressed in session: Goal 1   Pain:      none  0  Current suicide risk : Low   D  Lu Davis reports that today is not an easy day  She claims that everyone is giving her a hard time  She states that things from the past keep coming up  She is appropriately dressed and well groomed, her mood is dysthymic and affect cannot be assessed due to Covid masking  Thought process is scattered and speech is normal rate and volume  She is wheelchair bound,  She attempted to walk down the hallway which frustrated her caregiver as she is very unsteady on her feet  He followed with the wheelchair (not staying for the session at [facility] request)  Lu Davis was right she was not having a good day as she was stuck in the past   She was able with prompting to be brought into the here and now without agitation  She spoke of her children who are adults and she spoke of her  son who passed from a heart attack and processed  At the end of the session it was a struggle to keep Lu Davis in the here and now  She is friendly and cooperative,  Oneda Tino is decompensating from week to week as she does have a progressive disease  She is maintaining what progress she can and she has good days and bad days,  She recognizes at times that she is forgetting things which frustrates her  P  Follow up in 3 weeks,  Lu Davis will go out with Petar as much as she can to have a change of atmosphere  Behavioral Health Treatment Plan [facility]: Diagnosis and Treatment Plan explained to Meghan Wallace relates understanding diagnosis and is agreeable to Treatment Plan   Yes

## 2022-03-01 NOTE — TELEPHONE ENCOUNTER
Hayley Melgar called back; they did collect her urine    It went to a different lab; not [facility]  Not back yet

## 2022-03-03 ENCOUNTER — RA CDI HCC (OUTPATIENT)
Dept: OTHER | Facility: HOSPITAL | Age: 77
End: 2022-03-03

## 2022-03-03 ENCOUNTER — TELEPHONE (OUTPATIENT)
Dept: INTERNAL MEDICINE CLINIC | Facility: CLINIC | Age: 77
End: 2022-03-03

## 2022-03-03 NOTE — PROGRESS NOTES
[facility]       Chart reviewed, no opportunity found: CHART REVIEWED, NO OPPORTUNITY FOUND                        Patients insurance company: [facility]

## 2022-03-03 NOTE — TELEPHONE ENCOUNTER
Pt has UTI  Urine has bacteria     Susceptible to bactrum    Order will be Faxed over from [place] [id number]

## 2022-03-10 ENCOUNTER — OFFICE VISIT (OUTPATIENT)
Dept: INTERNAL MEDICINE CLINIC | Facility: CLINIC | Age: 77
End: 2022-03-10
Payer: COMMERCIAL

## 2022-03-10 VITALS
OXYGEN SATURATION: 97 % | TEMPERATURE: 98.2 F | HEART RATE: 75 BPM | SYSTOLIC BLOOD PRESSURE: 100 MMHG | WEIGHT: 142.4 LBS | HEIGHT: 63 IN | DIASTOLIC BLOOD PRESSURE: 62 MMHG | BODY MASS INDEX: 25.23 KG/M2

## 2022-03-10 DIAGNOSIS — F33.41 RECURRENT MAJOR DEPRESSIVE DISORDER, IN PARTIAL REMISSION (HCC): ICD-10-CM

## 2022-03-10 DIAGNOSIS — E03.9 ACQUIRED HYPOTHYROIDISM: ICD-10-CM

## 2022-03-10 DIAGNOSIS — K58.2 IRRITABLE BOWEL SYNDROME WITH BOTH CONSTIPATION AND DIARRHEA: ICD-10-CM

## 2022-03-10 DIAGNOSIS — E55.9 VITAMIN D DEFICIENCY: ICD-10-CM

## 2022-03-10 DIAGNOSIS — R07.9 CHEST PAIN, UNSPECIFIED TYPE: ICD-10-CM

## 2022-03-10 DIAGNOSIS — G20 PARKINSON'S DISEASE (HCC): Primary | ICD-10-CM

## 2022-03-10 DIAGNOSIS — R30.0 BURNING WITH URINATION: ICD-10-CM

## 2022-03-10 DIAGNOSIS — E78.2 MIXED HYPERLIPIDEMIA: ICD-10-CM

## 2022-03-10 DIAGNOSIS — R73.01 IMPAIRED FASTING GLUCOSE: ICD-10-CM

## 2022-03-10 PROCEDURE — 3725F SCREEN DEPRESSION PERFORMED: CPT | Performed by: INTERNAL MEDICINE

## 2022-03-10 PROCEDURE — 1101F PT FALLS ASSESS-DOCD LE1/YR: CPT | Performed by: INTERNAL MEDICINE

## 2022-03-10 PROCEDURE — 99214 OFFICE O/P EST MOD 30 MIN: CPT | Performed by: INTERNAL MEDICINE

## 2022-03-10 PROCEDURE — 1125F AMNT PAIN NOTED PAIN PRSNT: CPT | Performed by: INTERNAL MEDICINE

## 2022-03-10 PROCEDURE — 3288F FALL RISK ASSESSMENT DOCD: CPT | Performed by: INTERNAL MEDICINE

## 2022-03-10 PROCEDURE — 1170F FXNL STATUS ASSESSED: CPT | Performed by: INTERNAL MEDICINE

## 2022-03-10 PROCEDURE — 93000 ELECTROCARDIOGRAM COMPLETE: CPT | Performed by: INTERNAL MEDICINE

## 2022-03-10 RX ORDER — ASPIRIN 81 MG/1
81 TABLET, CHEWABLE ORAL ONCE
Status: DISCONTINUED | OUTPATIENT
Start: 2022-03-10 | End: 2022-03-10

## 2022-03-10 RX ORDER — ESCITALOPRAM OXALATE 20 MG/1
10 TABLET ORAL DAILY
Qty: 90 TABLET | Refills: 1
Start: 2022-03-10 | End: 2022-03-10

## 2022-03-10 RX ORDER — BUPROPION HYDROCHLORIDE 150 MG/1
150 TABLET ORAL EVERY MORNING
Qty: 90 TABLET | Refills: 3 | Status: SHIPPED | OUTPATIENT
Start: 2022-03-10

## 2022-03-10 RX ORDER — CLOBETASOL PROPIONATE 0.46 MG/ML
SOLUTION TOPICAL
COMMUNITY
Start: 2022-02-26 | End: 2022-07-01 | Stop reason: CLARIF

## 2022-03-10 RX ORDER — ASPIRIN 81 MG/1
81 TABLET, CHEWABLE ORAL DAILY
Start: 2022-03-10

## 2022-03-10 NOTE — PROGRESS NOTES
Assessment and Plan:     Problem List Items Addressed This Visit     None           Preventive health issues were discussed with patient, and age appropriate screening tests were ordered as noted in patient's After Visit Summary  Personalized health advice and appropriate referrals for health education or preventive services given if needed, as noted in patient's After Visit Summary       History of Present Illness:     Patient presents for Medicare Annual Wellness visit    Patient Care Team:  Kristi Wray MD as PCP - General (Internal Medicine)  Lawyer Eulalia MD as PCP - [address] [place] [address] (RTE)  Lawyer Eulalia [facility]  MD Doron Vásquez PA-C as Physician Assistant (Gastroenterology)  Mik Bah MD as Endoscopist     Problem List:     Patient Active Problem List   Diagnosis    [facility])    Scoliosis    Low back pain    Acquired hypothyroidism    Elevated antinuclear antibody (RAS) level    Family hx-breast malignancy    Mixed hyperlipidemia    Rosacea    Trochanteric bursitis    Urinary incontinence    Vitamin D deficiency    Flank pain    Irritable bowel syndrome with both constipation and diarrhea    REM behavioral disorder    Anxiety    Recurrent major depressive disorder, in partial remission ([address] )    Overweight    Osteopenia of multiple sites    Frequent UTI      Past Medical and Surgical History:     Past Medical History:   Diagnosis Date    Anxiety     BRCA1 negative 1980    BRCA2 negative 1980    Disease of thyroid gland     Generalized anxiety disorder 4/23/2015    Hepatic disease     Infectious viral hepatitis     Liver disease     Osteoporosis     Overweight (BMI 25 0-29 9) 2/12/2020    Parkinson's disease ([address] )     Patient refuses to disclose advance directive information 5/23/2018    Overview:  Yes, Patient instructed to provide copy of advance directive for provider to review and to be scanned into Electronic Medical Record  Overview:  Yes, Patient instructed to provide copy of advance directive for provider to review and to be scanned into Electronic Medical Record    Rosacea     Senile osteoporosis 5/27/2003    Shortness of breath      Past Surgical History:   Procedure Laterality Date    FOOT SURGERY Bilateral     HYSTERECTOMY      SC ESOPHAGOGASTRODUODENOSCOPY TRANSORAL DIAGNOSTIC N/A 9/5/2018    Procedure: EGD AND COLONOSCOPY;  Surgeon: Shira Hanna MD;  Location: [facility];   Service: Gastroenterology    URETHRA SURGERY      laparoscopic urethral suspension for stress incontinence      Family History:     Family History   Problem Relation Age of Onset    Lung cancer Mother     Dementia Mother     Lung cancer Father     Cancer Father     Esophageal cancer Brother     Cancer Brother     Cavazos's esophagus Brother     Esophageal cancer Family     Heart disease Family     Stroke Family         syndrome    No Known Problems Daughter     No Known Problems Maternal Grandmother     Breast cancer Paternal Grandmother 36    No Known Problems Paternal Aunt     Breast cancer Other 39    BRCA1 Positive Other 39    BRCA2 Positive Other 39      Social History:     Social History     Socioeconomic History    Marital status: Significant Other     Spouse name: None    Number of children: None    Years of education: Master's Degree    Highest education level: None   Occupational History     Comment: Retired   Tobacco Use    Smoking status: Never Smoker    Smokeless tobacco: Never Used   Vaping Use    Vaping Use: Never used   Substance and Sexual Activity    Alcohol use: Not Currently     Alcohol/week: 1 0 standard drink     Types: 1 Glasses of wine per week     Comment: Alcohol use per [employer]    Drug use: No    Sexual activity: Never   Other Topics Concern    None   Social History Narrative    Denied:  History of caffeine use     Social Determinants of Health Financial Resource Strain: Not on file   Food Insecurity: Not on file   Transportation Needs: Not on file   Physical Activity: Insufficiently Active    Days of Exercise per Week: 1 day    Minutes of Exercise per Session: 10 min   Stress: No Stress Concern Present    Feeling of Stress : Only a little   Social Connections: Not on file   Intimate Partner Violence: Not on file   Housing Stability: Not on file      Medications and Allergies:     Current Outpatient Medications   Medication Sig Dispense Refill    Ascorbic Acid (vitamin C) 100 MG tablet Take by mouth      carbidopa-levodopa (SINEMET)  mg per tablet Take 2 tablets by mouth 4 (four) times a day 720 tablet 1    Cholecalciferol (VITAMIN D) 2000 units tablet 1 daily      clobetasol (TEMOVATE) 0 05 % external solution       Cranberry 1000 MG CAPS Take 4,200 mg by mouth as needed       escitalopram (LEXAPRO) 20 mg tablet Take 1 tablet (20 mg total) by mouth daily 90 tablet 1    Multiple Vitamin (MULTIVITAMINS PO) daily      rotigotine (Neupro) 2 MG/24HR Place 1 patch on the skin daily 30 patch 5    levothyroxine 50 mcg tablet Take 1 tablet (50 mcg total) by mouth daily 90 tablet 0     No current facility-administered medications for this visit       Allergies   Allergen Reactions    Statins Other (See Comments)     jaundice    Naproxen Hives    Simvastatin Other (See Comments)     Jaundice    Dust Mite Extract Other (See Comments)     Per pt states does not know the type of reaction    Pollen Extract Sneezing, Nasal Congestion, Cough and Itching      Immunizations:     Immunization History   Administered Date(s) Administered    COVID-19 MODERNA VACC 0 5 ML IM 01/26/2021, 03/04/2021, 11/01/2021    H1N1 Inj 12/06/2009    H1N1, All Formulations 12/14/2009, 12/14/2009    Hep A, adult 06/06/1996, 05/22/1997    INFLUENZA 12/04/2006, 11/07/2007, 10/03/2008, 02/12/2010, 09/15/2010, 09/21/2011, 10/25/2012, 11/01/2013, 10/15/2014, 10/05/2015, 10/02/2016, 11/13/2017, 09/18/2018    Influenza Quadrivalent 3 years and older 10/05/2015    Influenza Quadrivalent 6 mos and older IM 09/18/2018    Influenza, high dose seasonal 0 7 mL 09/22/2020    Influenza, seasonal, injectable 12/04/2006, 11/07/2007, 10/03/2008, 02/12/2010, 09/15/2010, 09/21/2011, 10/25/2012, 11/01/2013, 10/15/2014    Pneumococcal Conjugate 13-Valent 05/03/2016    Pneumococcal Polysaccharide PPV23 03/17/2011    Td (adult), adsorbed 06/10/2002    Tdap 10/25/2012    Zoster 09/12/2012    influenza, trivalent, adjuvanted 10/14/2019      Health Maintenance:         Topic Date Due    DXA SCAN  06/10/2022    Hepatitis C Screening  Completed         Topic Date Due    Influenza Vaccine (1) 09/01/2021      Medicare Health Risk Assessment:     /62 (BP Location: Left arm, Patient Position: Sitting, Cuff Size: Standard) Comment: bp  Pulse 75   Temp 98 2 °F (36 8 °C) (Temporal) Comment: NO NSAIDS  Ht 5' 3" (1 6 m)   Wt 64 6 kg (142 lb 6 4 oz)   SpO2 97%   BMI 25 23 kg/m²      Max Beltran is here for her Subsequent Wellness visit  Health Risk Assessment:   Patient rates overall health as poor  Patient feels that their physical health rating is slightly worse  Eyesight was rated as same  Hearing was rated as same  Patient feels that their emotional and mental health rating is same  Patients states they are sometimes angry  Patient states they are sometimes unusually tired/fatigued  Pain experienced in the last 7 days has been some  Patient's pain rating has been 3/10  Patient states that she has experienced weight loss or gain in last 6 months  Depression Screening:   PHQ-9 Score: 6      Fall Risk Screening: In the past year, patient has experienced: no history of falling in past year      Urinary Incontinence Screening:   Patient has leaked urine accidently in the last six months  Home Safety:  Patient does not have trouble with stairs inside or outside of their home   Patient has working smoke alarms and has working carbon monoxide detector  Home safety hazards include: none  Nutrition:   Current diet is Regular  Medications:   Patient is currently taking over-the-counter supplements  OTC medications include: see medication list  Patient is able to manage medications  Activities of Daily Living (ADLs)/Instrumental Activities of Daily Living (IADLs):   Walk and transfer into and out of bed and chair?: Yes  Dress and groom yourself?: Yes    Bathe or shower yourself?: Yes    Feed yourself? Yes  Do your laundry/housekeeping?: Yes  Manage your money, pay your bills and track your expenses?: Yes  Make your own meals?: Yes    Do your own shopping?: Yes    Previous Hospitalizations:   Any hospitalizations or ED visits within the last 12 months?: Yes    How many hospitalizations have you had in the last year?: 1-2    Advance Care Planning:   Living will: Yes    Durable POA for healthcare: Yes    Advanced directive: Yes      PREVENTIVE SCREENINGS      Cardiovascular Screening:    General: Screening Not Indicated and History Lipid Disorder      Diabetes Screening:     General: Screening Current      Breast Cancer Screening:     General: Screening Current      Cervical Cancer Screening:    General: Screening Not Indicated      Osteoporosis Screening:    General: Screening Current      Lung Cancer Screening:     General: Screening Not Indicated      Hepatitis C Screening:    General: Screening Current    Screening, Brief Intervention, and Referral to Treatment (SBIRT)    Screening  Typical number of drinks in a day: 1  Typical number of drinks in a week: 1  Interpretation: Low risk drinking behavior      Single Item Drug Screening:  How often have you used an illegal drug (including marijuana) or a prescription medication for non-medical reasons in the past year? never    Single Item Drug Screen Score: 0  Interpretation: Negative screen for possible drug use disorder      Gabriel Zhao MD

## 2022-03-10 NOTE — PROGRESS NOTES
Assessment/Plan:    No problem-specific Assessment & Plan notes found for this encounter  Diagnoses and all orders for this visit:    Parkinson's disease ([address] )  On Sinemet  mg 2 tablets 4 times daily and Neupro 2 mg patch  Stays at long term care facility since September 2021  Has recently been worsening  Feels that this is frustrating for her  and daughter  Feels pressure from them to do more exercising to get better, but feels that she can't do more at this time  Will see her neurologist, Dr Dale Lawrence, on Monday next week  Blood work done to rule out other causes of worsening fatigue was WNLs (TSH, vit D, folate, vit B12, magnesium, phosphorus)  Acquired hypothyroidism  Well controlled; lastTSH 1 580  On levothyroxine 50 mcg daily  Recurrent major depressive disorder, in partial remission (HCC)  On Lexapro 20 mg daily  Has been feeling fatigued lately  Will try to reduce Lexapro to 10 mg daily and will add wellbutrin 150 mg daily  Mixed hyperlipidemia  Stable   from 130 prior  Does not tolerate statins (had elevated liver chemistries in the past)  Vitamin D deficiency  On supplementation  Vit D level WNLs at 40 2 on 1/14/22  Irritable bowel syndrome with both constipation and diarrhea  No active symptoms  Not currently taking any medications  UTI  Was diagnosed with UTI  Patient and  not sure if she got the antibiotics for it  Per review of notes in EMR a script for bactrim was send to the facility on 3/3/22  Patient states that she still has burning with urination  Will get repeat UA with reflex microscopy and culture  Chest pain, unspecified type  -     POCT ECG  Has been having intermittent chest pressure for about 1 year  Follows with Dr Arias Davies  Had nuclear stress test on 1/20/22 that showed small area of reversible ischemia in mid/apical anterior region of LV  Was scheduled for [facility] but cancelled after reading about possible complications     Has symptoms every 2-3 weeks  Mostly at night in rest, never with exertion  No radiation  Describes the pain as heavy load over the middle of the chest  Had an episode here in the office and POCT EKG was done which showed NSR with incomplete RBBB and no acute ischemic changes  Unchanged from prior EKG  Symptoms resolved spontaneously in couple of minutes  Patient is on aspirin 81 mg daily  Not on statin because of intolerance (elevated liver chemistries)  Will go back to see Dr Zahida Matias to discuss next steps  BP is soft and leaves not much room to add antianginal medication at this time  Will defer to cardiology  Other orders  -     clobetasol (TEMOVATE) 0 05 % external solution        Subjective:      Patient ID: Ekaterina Jones is a 69 y o  female  Here for routine follow-up of chronic medical problems and review of lab work done in January  States that her Parkinson's is worsening  Is in nursing facility since september 2021  States that lack of energy is bothering her most  Also states that her  gets angry with her  States that her blames her for not doing enough to get better  Feels that her  and daughter have unrealistic expectations and are expecting her to improve while she feels that she will not get better anymore  Will see her neurologist next Monday  Has been seeing the cardiologist for episodes of CP over the last year  Had positive nuclear stress test and was scheduled for [facility] but cancelled after reading about possible complications  Will go back to the cardiologist to discuss next steps  Further states that she was recently diagnosed with UTI at the facility and is not sure if she got AB yet  Still complaints of burning with urination         The following portions of the patient's history were reviewed and updated as appropriate: allergies, current medications, past family history, past medical history, past social history, past surgical history and problem list     Review of Systems   Constitutional: Positive for fatigue  Negative for appetite change, chills, diaphoresis, fever and unexpected weight change  HENT: Negative for congestion, hearing loss and rhinorrhea  Eyes: Negative for visual disturbance  Respiratory: Positive for shortness of breath (associated with the chest pain)  Negative for cough, chest tightness and wheezing  Cardiovascular: Positive for chest pain  Negative for palpitations and leg swelling  Gastrointestinal: Negative for abdominal pain and blood in stool  Endocrine: Negative for cold intolerance, heat intolerance, polydipsia and polyuria  Genitourinary: Positive for dysuria  Negative for difficulty urinating, frequency and urgency  Musculoskeletal: Negative for arthralgias and myalgias  Skin: Negative for rash  Neurological: Negative for dizziness, weakness, light-headedness and headaches  Hematological: Does not bruise/bleed easily  Psychiatric/Behavioral: Negative for dysphoric mood and sleep disturbance  Objective:      /62 (BP Location: Left arm, Patient Position: Sitting, Cuff Size: Standard) Comment: bp  Pulse 75   Temp 98 2 °F (36 8 °C) (Temporal) Comment: NO NSAIDS  Ht 5' 3" (1 6 m)   Wt 64 6 kg (142 lb 6 4 oz)   SpO2 97%   BMI 25 23 kg/m²          Physical Exam  Constitutional:       Appearance: Normal appearance  She is well-developed  HENT:      Head: Normocephalic and atraumatic  Eyes:      General: No scleral icterus  Conjunctiva/sclera: Conjunctivae normal    Neck:      Thyroid: No thyromegaly  Vascular: No JVD  Trachea: No tracheal deviation  Cardiovascular:      Rate and Rhythm: Normal rate and regular rhythm  Heart sounds: Normal heart sounds  No murmur heard  No friction rub  No gallop  Pulmonary:      Effort: Pulmonary effort is normal  No respiratory distress  Breath sounds: Normal breath sounds  No wheezing, rhonchi or rales     Abdominal:      General: Bowel sounds are normal  There is no distension  Palpations: Abdomen is soft  There is no mass  Tenderness: There is no abdominal tenderness  There is no guarding or rebound  Musculoskeletal:      Cervical back: Normal range of motion and neck supple  Right lower leg: No edema  Left lower leg: No edema  Skin:     General: Skin is warm and dry  Neurological:      General: No focal deficit present  Mental Status: She is alert and oriented to person, place, and time  Psychiatric:         Behavior: Behavior normal          Thought Content:  Thought content normal          Judgment: Judgment normal

## 2022-03-14 ENCOUNTER — OFFICE VISIT (OUTPATIENT)
Dept: NEUROLOGY | Facility: CLINIC | Age: 77
End: 2022-03-14
Payer: COMMERCIAL

## 2022-03-14 ENCOUNTER — TELEPHONE (OUTPATIENT)
Dept: NEUROLOGY | Facility: CLINIC | Age: 77
End: 2022-03-14

## 2022-03-14 VITALS
WEIGHT: 141.8 LBS | SYSTOLIC BLOOD PRESSURE: 110 MMHG | BODY MASS INDEX: 25.12 KG/M2 | DIASTOLIC BLOOD PRESSURE: 68 MMHG | HEART RATE: 80 BPM | TEMPERATURE: 97.8 F

## 2022-03-14 DIAGNOSIS — F02.80 DEMENTIA ASSOCIATED WITH OTHER UNDERLYING DISEASE WITHOUT BEHAVIORAL DISTURBANCE (HCC): ICD-10-CM

## 2022-03-14 DIAGNOSIS — F41.9 ANXIETY: ICD-10-CM

## 2022-03-14 DIAGNOSIS — G20 PARKINSON'S DISEASE (HCC): Primary | ICD-10-CM

## 2022-03-14 DIAGNOSIS — R44.1 HALLUCINATION, VISUAL: ICD-10-CM

## 2022-03-14 PROBLEM — F03.90 DEMENTIA WITHOUT BEHAVIORAL DISTURBANCE (HCC): Status: ACTIVE | Noted: 2022-03-14

## 2022-03-14 PROCEDURE — 99215 OFFICE O/P EST HI 40 MIN: CPT | Performed by: PSYCHIATRY & NEUROLOGY

## 2022-03-14 PROCEDURE — 1036F TOBACCO NON-USER: CPT | Performed by: PSYCHIATRY & NEUROLOGY

## 2022-03-14 PROCEDURE — 1160F RVW MEDS BY RX/DR IN RCRD: CPT | Performed by: PSYCHIATRY & NEUROLOGY

## 2022-03-14 RX ORDER — BETAMETHASONE DIPROPIONATE 0.5 MG/G
CREAM TOPICAL 2 TIMES DAILY
COMMUNITY
End: 2022-07-01 | Stop reason: CLARIF

## 2022-03-14 RX ORDER — ESCITALOPRAM OXALATE 20 MG/1
20 TABLET ORAL DAILY
COMMUNITY

## 2022-03-14 RX ORDER — SULFAMETHOXAZOLE AND TRIMETHOPRIM 800; 160 MG/1; MG/1
1 TABLET ORAL EVERY 12 HOURS SCHEDULED
COMMUNITY
End: 2022-08-08 | Stop reason: CLARIF

## 2022-03-14 NOTE — PROGRESS NOTES
Patient ID: Chanelle Cisneros is a 69 y o  female  Assessment/Plan:    Parkinson's disease ([facility] )  Parkinson's disease with dyskinesia and dementia complicated by hallucinations/ delusions (having duplications of her friend and Stanley Carvalho)  Motor symptoms are fairly well controlled at current doses of levodopa  She has no clear wearing off  She has mild generalized dyskinesias which do not appear to be bothersome at this time  Most concerned about bouts of fatigue  Time spent discussing her concerns with regards to medications  We discussed progression of Parkinson's disease and dementia and how medications can sometimes add to symptoms such as hallucinations  W we will discontinue Neupro patch  Continue on Sinemet with no change  Changes recently made to Lexapro and Wellbutrin started by PCP  Will see how she does over the next month  If no improvement we may consider adding memantine or treating hallucinations with quetiapine or Nuplazid after further discussion of risk and benefit  Diagnoses and all orders for this visit:    Parkinson's disease ([facility] )    Anxiety    Hallucination, visual    Dementia associated with other underlying disease without behavioral disturbance ([facility] )    Other orders  -     escitalopram (LEXAPRO) 20 mg tablet; Take 20 mg by mouth daily  -     sulfamethoxazole-trimethoprim (BACTRIM DS) 800-160 mg per tablet; Take 1 tablet by mouth every 12 (twelve) hours  -     betamethasone dipropionate (DIPROSONE) 0 05 % cream; Apply topically 2 (two) times a day       Spent 46 minutes on patient visit, counseling, and partial documentation  Subjective:    Carol Miller is a right-handed woman with levodopa-responsive Parkinson's disease  To review, symptom onset in 2011 with left foot tremor, later complicated by non-motor wearing-off, slight dyskinesias, FoG  In Fall of 2011 she noted some balance trouble and later developed micrographia   She was started on Azilect 1mg with little notable improvement  Neupro patch later added  Sinemet was added later and has been titrated up over time  At her last visit she had some worsening of her gait and freezing episodes with no benefit on higher rivastigmine dosing  Sinemet was slightly increased  Prior medication trials:  - Azilect 0 5mg in the evening (stopped by PCP, gait worsened without it)  -Amantadine - side effects   - Exelon patch 9 5mg - itchy blister  -rivastigmine pill- GI side effects    Current medications and timing:  - Sinemet 25/100; 2tabs qid (8am, 12:30, 4:30pm, 8:30pm)  - Neupro Patch 2mg evening      Main concern is her medications, as she wishes to know what is causing her symptoms  She has mild neck dyskinesia  She would like to move the way she feels when she finishes up with PT  She does the exercises at home but does not get the same results  She has fatigue  [facility]  She can have periods of difficulty moving and walking  This can be triggered by stress and may be prior to the next dose, this is not clear  No tremors  Speech is soft  There is no drooling  No difficulty chewing and swallowing except for once in awhile she will feel a lump  This is infrequent  Moderate slowness  noted with dressing and hygiene acts  There is no difficulty arising out of chair  She uses a walker  Sleeps well  There is daytime sedation  She has hallucinations but reports these are not common  At times she has reported a cat, dog, kids  She can sometime describe feling like she is somewhere else although she knows she is in [facility]  She has duplicated [place] [id number] and her friend Malcom Lola  Objective:    Blood pressure 110/68, pulse 80, temperature 97 8 °F (36 6 °C), weight 64 3 kg (141 lb 12 8 oz), not currently breastfeeding  Physical Exam  Vitals reviewed  Eyes:      Extraocular Movements: Extraocular movements intact        Pupils: Pupils are equal, round, and reactive to light  Neurological:      Mental Status: She is alert  Deep Tendon Reflexes: Strength normal    Psychiatric:         Speech: Speech normal          Neurological Exam  Mental Status  Alert  Oriented only to person and situation  Speech is normal  Language is fluent with no aphasia  Attention and concentration are normal     Cranial Nerves  CN III, IV, VI: Extraocular movements intact bilaterally  Pupils equal round and reactive to light bilaterally  CN VII: Full and symmetric facial movement  CN VIII: Hearing is normal   CN XI: Shoulder shrug strength is normal   CN XII: Tongue midline without atrophy or fasciculations  Motor   Increased muscle tone  Strength is 5/5 throughout all four extremities  Sensory  Light touch is normal in upper and lower extremities  Coordination  Right: Finger-to-nose normal  Rapid alternating movement normal   Left: Finger-to-nose normal  Rapid alternating movement abnormality:  See MDS UPDRS III  Gait  Casual gait: Unable to rise from chair without using arms  Reduced stride  En bloc turn  Monia Le       UPDRS motor:                              Time since last dose:   6/15/21    Speech   0 0   Facial Expression    1   Rigidity - Neck   0    Rigidity - Upper Extremity (Right)   0 0   Rigidity - Upper Extremity (Left)    1 1   Rigidity - Lower Extremity (Right)   1 0   Rigidity - Lower Extremity (Left)    0 0   Finger Taps (Right)    0 0   Finger Taps (Left)    0 0   Hand Movement (Right)   0 0   Hand Movement (Left)    1 0   Pronation/Supination (Right)   1 0   Pronation/Supination (Left)    1 0   Toe Tapping (Right)  0 0   Toe Tapping (Left)  1 1   Leg Agility (Right)   0 0   Leg Agility (Left)    0 0   Arising from Chair    1 1   Gait    2 2   Freezing of Gait  0 0   Postural Stability        Posture  1 1   Global spontaneity of movement  1 1   Postural Tremor (Right)  0 0   Postural Tremor (Left)  0 0   Kinetic Tremor (Right)   0 0   Kinetic Tremor (Left)   0 0   Rest tremor amplitude RUE  0 0   Rest tremor amplitude LUE  0 0   Rest tremor amplitude RLE  0 0   Reset tremor amplitude LLE  0 0   Lip/Jaw Tremor    0   Consistency of tremor  0 0   Motor Exam Total:                ROS:    Review of Systems   Constitutional: Positive for fatigue (patient states she is exhausted)  Negative for appetite change and fever  HENT: Positive for trouble swallowing (A few episodes where she noticed it gets stuck)  Negative for hearing loss, tinnitus and voice change  Eyes: Negative  Negative for photophobia and pain  Respiratory: Negative  Negative for shortness of breath  Cardiovascular: Negative  Negative for palpitations  Gastrointestinal: Negative  Negative for nausea and vomiting  Endocrine: Negative  Negative for cold intolerance  Genitourinary: Negative  Negative for dysuria, frequency and urgency  Musculoskeletal: Positive for gait problem (sometimes freezes) and neck pain  Negative for myalgias  Balance Issues  Head bobbing     Skin: Negative  Negative for rash  Allergic/Immunologic: Negative  Neurological: Positive for dizziness (Some episodicly) and tremors (at times she does but not often)  Negative for seizures, syncope, facial asymmetry, speech difficulty, weakness, light-headedness, numbness and headaches  Hematological: Negative  Does not bruise/bleed easily  Psychiatric/Behavioral: Positive for hallucinations (patient states she doesnt feel as she has hallucinations as she had a few episodes but family/nurse states otherwise)  Negative for confusion and sleep disturbance  At times she had talked to her daughter before and knew she was in the facility she is in but felt as if she was somewhere else  Patient states she feels like people are against her      All other systems reviewed and are negative  Review of system documented by the MA, was personally reviewed

## 2022-03-14 NOTE — TELEPHONE ENCOUNTER
Duc Johnson for pt calling to report that pt has aggressive form of dementia  Has a lot of hallucinations and behaviors  Memory is also getting worse  States pt currently in nursing home on the dementia unit  He says she had an appt today that he was not able to attend  Reviewed office visit notes with him  Scheduled appt for pt to be seen Yolette Larry for 4 month follow up on 8/9/22   Nothing further at this time

## 2022-03-14 NOTE — PATIENT INSTRUCTIONS
Parkinson's disease with dyskinesia and dementia complicated by hallucinations/ delusions (Capgras syn),    Will discontinue Neupro patch  Continue on Sinemet with no change  Changes recently made to Lexapro and Wellbutrin started by PCP  Will see how she does over the next month  If no improvement we may consider adding memantine or treating hallucinations

## 2022-03-15 NOTE — ASSESSMENT & PLAN NOTE
Parkinson's disease with dyskinesia and dementia complicated by hallucinations/ delusions (having duplications of her friend and Stanley Carvalho)  Motor symptoms are fairly well controlled at current doses of levodopa  She has no clear wearing off  She has mild generalized dyskinesias which do not appear to be bothersome at this time  Most concerned about bouts of fatigue  Time spent discussing her concerns with regards to medications  We discussed progression of Parkinson's disease and dementia and how medications can sometimes add to symptoms such as hallucinations  W we will discontinue Neupro patch  Continue on Sinemet with no change  Changes recently made to Lexapro and Wellbutrin started by PCP  Will see how she does over the next month  If no improvement we may consider adding memantine or treating hallucinations with quetiapine or Nuplazid after further discussion of risk and benefit

## 2022-03-29 ENCOUNTER — TELEPHONE (OUTPATIENT)
Dept: INTERNAL MEDICINE CLINIC | Facility: CLINIC | Age: 77
End: 2022-03-29

## 2022-03-29 ENCOUNTER — SOCIAL WORK (OUTPATIENT)
Dept: BEHAVIORAL/MENTAL HEALTH CLINIC | Age: 77
End: 2022-03-29
Payer: COMMERCIAL

## 2022-03-29 DIAGNOSIS — F33.41 RECURRENT MAJOR DEPRESSIVE DISORDER, IN PARTIAL REMISSION (HCC): Primary | ICD-10-CM

## 2022-03-29 DIAGNOSIS — F02.80 DEMENTIA ASSOCIATED WITH OTHER UNDERLYING DISEASE WITHOUT BEHAVIORAL DISTURBANCE (HCC): ICD-10-CM

## 2022-03-29 PROCEDURE — 90834 PSYTX W PT 45 MINUTES: CPT | Performed by: SOCIAL WORKER

## 2022-03-29 NOTE — TELEPHONE ENCOUNTER
I faxed the letter from [address] [place] [address] B to Stanley  and also emailed it to her son  [email or web address]

## 2022-03-29 NOTE — PSYCH
Start Time 2:00PM-2:45PM  Psychotherapy Provided: Individual Psychotherapy 45 minutes   Length of time in session: 45 minutes, follow up in 3 weeks    No diagnosis found  Goals addressed in session: Goal 1   Pain:  0    none  0  Current suicide risk : Edmund Stephenson Early shared that her daughter Debbie West has a job, finally  She states that she has not seen her youngest son as it is baseball season as he is a   She Became easily confused and she tried to kill a spider which this writer did not see  Britt Ontiveros is appropriately dressed in a casual manner, well groomed, wearing glasses and in a wheelchair  Her mood Is dysthymic and affect is flat  Her thought process is illogical and confused at times and her speech is normal rate and normal volume  She described some of the earlier years as this appears to be where she is most comfortable  She spoke of being upset recently and when asked 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  She was able to share that her partner was upset with her when she had her daughter accompany her to the neurologist which is factual    Paulparker Wolfe appears to be having visual hallucinations as evidenced by her trying to kill a spider which was not there  She is lucid at times and not at others  She is emotionally regulated this session which is in contrast to last session  Joseph Gifford to follow up in 4 weeks and she will also contact her children for added support  Behavioral Health Treatment Plan [facility]: Diagnosis and Treatment Plan explained to Rafia Ferreira relates understanding diagnosis and is agreeable to Treatment Plan   Yes

## 2022-04-06 ENCOUNTER — OFFICE VISIT (OUTPATIENT)
Dept: INTERNAL MEDICINE CLINIC | Facility: CLINIC | Age: 77
End: 2022-04-06
Payer: COMMERCIAL

## 2022-04-06 VITALS
WEIGHT: 133.8 LBS | OXYGEN SATURATION: 95 % | DIASTOLIC BLOOD PRESSURE: 68 MMHG | RESPIRATION RATE: 16 BRPM | TEMPERATURE: 97.7 F | HEIGHT: 63 IN | BODY MASS INDEX: 23.71 KG/M2 | SYSTOLIC BLOOD PRESSURE: 110 MMHG | HEART RATE: 77 BPM

## 2022-04-06 DIAGNOSIS — F33.41 RECURRENT MAJOR DEPRESSIVE DISORDER, IN PARTIAL REMISSION (HCC): ICD-10-CM

## 2022-04-06 DIAGNOSIS — F41.9 ANXIETY: ICD-10-CM

## 2022-04-06 DIAGNOSIS — N39.0 RECURRENT UTI: ICD-10-CM

## 2022-04-06 DIAGNOSIS — G20 PARKINSON'S DISEASE (HCC): ICD-10-CM

## 2022-04-06 DIAGNOSIS — F02.80 DEMENTIA ASSOCIATED WITH OTHER UNDERLYING DISEASE WITHOUT BEHAVIORAL DISTURBANCE (HCC): Primary | ICD-10-CM

## 2022-04-06 DIAGNOSIS — R44.1 HALLUCINATION, VISUAL: ICD-10-CM

## 2022-04-06 PROCEDURE — 99214 OFFICE O/P EST MOD 30 MIN: CPT | Performed by: INTERNAL MEDICINE

## 2022-04-06 PROCEDURE — 1036F TOBACCO NON-USER: CPT | Performed by: INTERNAL MEDICINE

## 2022-04-06 PROCEDURE — 3288F FALL RISK ASSESSMENT DOCD: CPT | Performed by: INTERNAL MEDICINE

## 2022-04-06 PROCEDURE — 1160F RVW MEDS BY RX/DR IN RCRD: CPT | Performed by: INTERNAL MEDICINE

## 2022-04-06 PROCEDURE — G0439 PPPS, SUBSEQ VISIT: HCPCS | Performed by: INTERNAL MEDICINE

## 2022-04-06 PROCEDURE — 1125F AMNT PAIN NOTED PAIN PRSNT: CPT | Performed by: INTERNAL MEDICINE

## 2022-04-06 PROCEDURE — 1170F FXNL STATUS ASSESSED: CPT | Performed by: INTERNAL MEDICINE

## 2022-04-06 RX ORDER — METHENAMINE HIPPURATE 1000 MG/1
1 TABLET ORAL 2 TIMES DAILY WITH MEALS
Qty: 60 TABLET | Refills: 5 | Status: SHIPPED | OUTPATIENT
Start: 2022-04-06

## 2022-04-06 NOTE — PROGRESS NOTES
Assessment/Plan:    Diagnoses and all orders for this visit:    Dementia associated with other underlying disease without behavioral disturbance (HCC)    Parkinson's disease ([address] )    Anxiety    Hallucination, visual    Recurrent major depressive disorder, in partial remission (HCC)    Recurrent UTI  -     methenamine hippurate (HIPREX) 1 g tablet; Take 1 tablet (1 g total) by mouth 2 (two) times a day with meals              Patient Instructions     Depressive symptoms have actually worsened over the last month since decreasing Lexapro from 20-10 mg and adding Wellbutrin 150 mg daily  Patient has not been noted to have increased motivation or engagement in activities but has clearly become more tearful over last month  Neupro was also weaned to off during this same time       She has been experiencing more frequent visual hallucinations  And neurology has discussed dx Capgras  My plan would be to increase lexapro back to 20 mg and keep 150 mg Wellbutrin XL, but I will confer w/ Dr Ramirez Alvarez first as she had mentioned considering seroquel or Nuplazid  I will d/w Clayton and Family subseqeunt to this conversation  Recurrent UTIs-will start methenamine      Medicare Preventive Visit Patient Instructions  Thank you for completing your Welcome to Medicare Visit or Medicare Annual Wellness Visit today  Your next wellness visit will be due in one year (4/7/2023)  The screening/preventive services that you may require over the next 5-10 years are detailed below  Some tests may not apply to you based off risk factors and/or age  Screening tests ordered at today's visit but not completed yet may show as past due  Also, please note that scanned in results may not display below    Preventive Screenings:  Service Recommendations Previous Testing/Comments   Colorectal Cancer Screening  * Colonoscopy    * Fecal Occult Blood Test (FOBT)/Fecal Immunochemical Test (FIT)  * Fecal DNA/Cologuard Test  * Flexible Sigmoidoscopy Age: 54-65 years old   Colonoscopy: every 10 years (may be performed more frequently if at higher risk)  OR  FOBT/FIT: every 1 year  OR  Cologuard: every 3 years  OR  Sigmoidoscopy: every 5 years  Screening may be recommended earlier than age 48 if at higher risk for colorectal cancer  Also, an individualized decision between you and your healthcare provider will decide whether screening between the ages of 74-80 would be appropriate  Colonoscopy: Not on file  FOBT/FIT: Not on file  Cologuard: Not on file  Sigmoidoscopy: Not on file          Breast Cancer Screening Age: 36 years old  Frequency: every 1-2 years  Not required if history of left and right mastectomy Mammogram: 09/17/2021    Screening Current   Cervical Cancer Screening Between the ages of 21-29, pap smear recommended once every 3 years  Between the ages of 33-67, can perform pap smear with HPV co-testing every 5 years  Recommendations may differ for women with a history of total hysterectomy, cervical cancer, or abnormal pap smears in past  Pap Smear: Not on file    Screening Not Indicated   Hepatitis C Screening Once for adults born between 1945 and 1965  More frequently in patients at high risk for Hepatitis C Hep C Antibody: 12/31/2019    Screening Current   Diabetes Screening 1-2 times per year if you're at risk for diabetes or have pre-diabetes Fasting glucose: 96 mg/dL   A1C: 5 7 %    Screening Current   Cholesterol Screening Once every 5 years if you don't have a lipid disorder  May order more often based on risk factors  Lipid panel: 01/14/2022    Screening Not Indicated  History Lipid Disorder     Other Preventive Screenings Covered by Medicare:  1  Abdominal Aortic Aneurysm (AAA) Screening: covered once if your at risk  You're considered to be at risk if you have a family history of AAA    2  Lung Cancer Screening: covers low dose CT scan once per year if you meet all of the following conditions: (1) Age 50-69; (2) No signs or symptoms of lung cancer; (3) Current smoker or have quit smoking within the last 15 years; (4) You have a tobacco smoking history of at least 30 pack years (packs per day multiplied by number of years you smoked); (5) You get a written order from a healthcare provider  3  Glaucoma Screening: covered annually if you're considered high risk: (1) You have diabetes OR (2) Family history of glaucoma OR (3)  aged 48 and older OR (3)  American aged 72 and older  3  Osteoporosis Screening: covered every 2 years if you meet one of the following conditions: (1) You're estrogen deficient and at risk for osteoporosis based off medical history and other findings; (2) Have a vertebral abnormality; (3) On glucocorticoid therapy for more than 3 months; (4) Have primary hyperparathyroidism; (5) On osteoporosis medications and need to assess response to drug therapy  · Last bone density test (DXA Scan): 06/10/2020   5  HIV Screening: covered annually if you're between the age of 15-65  Also covered annually if you are younger than 13 and older than 72 with risk factors for HIV infection  For pregnant patients, it is covered up to 3 times per pregnancy  Immunizations:  Immunization Recommendations   Influenza Vaccine Annual influenza vaccination during flu season is recommended for all persons aged >= 6 months who do not have contraindications   Pneumococcal Vaccine (Prevnar and Pneumovax)  * Prevnar = PCV13  * Pneumovax = PPSV23   Adults 25-60 years old: 1-3 doses may be recommended based on certain risk factors  Adults 72 years old: Prevnar (PCV13) vaccine recommended followed by Pneumovax (PPSV23) vaccine  If already received PPSV23 since turning 65, then PCV13 recommended at least one year after PPSV23 dose     Hepatitis B Vaccine 3 dose series if at intermediate or high risk (ex: diabetes, end stage renal disease, liver disease)   Tetanus (Td) Vaccine - COST NOT COVERED BY MEDICARE PART B Following completion of primary series, a booster dose should be given every 10 years to maintain immunity against tetanus  Td may also be given as tetanus wound prophylaxis  Tdap Vaccine - COST NOT COVERED BY MEDICARE PART B Recommended at least once for all adults  For pregnant patients, recommended with each pregnancy  Shingles Vaccine (Shingrix) - COST NOT COVERED BY MEDICARE PART B  2 shot series recommended in those aged 48 and above     Health Maintenance Due:      Topic Date Due    DXA SCAN  06/10/2022    Hepatitis C Screening  Completed     Immunizations Due:      Topic Date Due    Influenza Vaccine (1) 09/01/2021     Advance Directives   What are advance directives? Advance directives are legal documents that state your wishes and plans for medical care  These plans are made ahead of time in case you lose your ability to make decisions for yourself  Advance directives can apply to any medical decision, such as the treatments you want, and if you want to donate organs  What are the types of advance directives? There are many types of advance directives, and each state has rules about how to use them  You may choose a combination of any of the following:  · Living will: This is a written record of the treatment you want  You can also choose which treatments you do not want, which to limit, and which to stop at a certain time  This includes surgery, medicine, IV fluid, and tube feedings  · Durable power of  for healthcare [facility]): This is a written record that states who you want to make healthcare choices for you when you are unable to make them for yourself  This person, called a proxy, is usually a family member or a friend  You may choose more than 1 proxy  · Do not resuscitate (DNR) order:  A DNR order is used in case your heart stops beating or you stop breathing  It is a request not to have certain forms of treatment, such as CPR   A DNR order may be included in other types of advance directives  · Medical directive: This covers the care that you want if you are in a coma, near death, or unable to make decisions for yourself  You can list the treatments you want for each condition  Treatment may include pain medicine, surgery, blood transfusions, dialysis, IV or tube feedings, and a ventilator (breathing machine)  · Values history: This document has questions about your views, beliefs, and how you feel and think about life  This information can help others choose the care that you would choose  Why are advance directives important? An advance directive helps you control your care  Although spoken wishes may be used, it is better to have your wishes written down  Spoken wishes can be misunderstood, or not followed  Treatments may be given even if you do not want them  An advance directive may make it easier for your family to make difficult choices about your care  Urinary Incontinence   Urinary incontinence (UI)  is when you lose control of your bladder  UI develops because your bladder cannot store or empty urine properly  The 3 most common types of UI are stress incontinence, urge incontinence, or both  Medicines:   · May be given to help strengthen your bladder control  Report any side effects of medication to your healthcare provider  Do pelvic muscle exercises often:  Your pelvic muscles help you stop urinating  Squeeze these muscles tight for 5 seconds, then relax for 5 seconds  Gradually work up to squeezing for 10 seconds  Do 3 sets of 15 repetitions a day, or as directed  This will help strengthen your pelvic muscles and improve bladder control  Train your bladder:  Go to the bathroom at set times, such as every 2 hours, even if you do not feel the urge to go  You can also try to hold your urine when you feel the urge to go  For example, hold your urine for 5 minutes when you feel the urge to go  As that becomes easier, hold your urine for 10 minutes     Self-care:   · Keep a UI record  Write down how often you leak urine and how much you leak  Make a note of what you were doing when you leaked urine  · Drink liquids as directed  You may need to limit the amount of liquid you drink to help control your urine leakage  Do not drink any liquid right before you go to bed  Limit or do not have drinks that contain caffeine or alcohol  · Prevent constipation  Eat a variety of high-fiber foods  Good examples are high-fiber cereals, beans, vegetables, and whole-grain breads  Walking is the best way to trigger your intestines to have a bowel movement  · Exercise regularly and maintain a healthy weight  Weight loss and exercise will decrease pressure on your bladder and help you control your leakage  · Use a catheter as directed  to help empty your bladder  A catheter is a tiny, plastic tube that is put into your bladder to drain your urine  · Go to behavior therapy as directed  Behavior therapy may be used to help you learn to control your urge to urinate  © Copyright [employer] 2018 Information is for End User's use only and may not be sold, redistributed or otherwise used for commercial purposes   All illustrations and images included in CareNotes® are the copyrighted property of [employer] M [employer] Inc  or [employer]         Subjective:      Patient ID: Gil Weathers is a 69 y o  female    HPI      Current Outpatient Medications:     Ascorbic Acid (vitamin C) 100 MG tablet, Take by mouth, Disp: , Rfl:     aspirin 81 mg chewable tablet, Chew 1 tablet (81 mg total) daily, Disp: , Rfl:     betamethasone dipropionate (DIPROSONE) 0 05 % cream, Apply topically 2 (two) times a day, Disp: , Rfl:     buPROPion (Wellbutrin XL) 150 mg 24 hr tablet, Take 1 tablet (150 mg total) by mouth every morning, Disp: 90 tablet, Rfl: 3    carbidopa-levodopa (SINEMET)  mg per tablet, Take 2 tablets by mouth 4 (four) times a day, Disp: 720 tablet, Rfl: 1    Cholecalciferol (VITAMIN D) 2000 units tablet, 1 daily, Disp: , Rfl:     Cranberry 1000 MG CAPS, Take 4,200 mg by mouth as needed , Disp: , Rfl:     escitalopram (LEXAPRO) 10 mg tablet, Take 1 tablet (10 mg total) by mouth daily, Disp: 30 tablet, Rfl: 5    Multiple Vitamin (MULTIVITAMINS PO), daily, Disp: , Rfl:     clobetasol (TEMOVATE) 0 05 % external solution, , Disp: , Rfl:     escitalopram (LEXAPRO) 20 mg tablet, Take 20 mg by mouth daily (Patient not taking: Reported on 4/6/2022 ), Disp: , Rfl:     levothyroxine 50 mcg tablet, Take 1 tablet (50 mcg total) by mouth daily, Disp: 90 tablet, Rfl: 0    methenamine hippurate (HIPREX) 1 g tablet, Take 1 tablet (1 g total) by mouth 2 (two) times a day with meals, Disp: 60 tablet, Rfl: 5    sulfamethoxazole-trimethoprim (BACTRIM DS) 800-160 mg per tablet, Take 1 tablet by mouth every 12 (twelve) hours (Patient not taking: Reported on 4/6/2022 ), Disp: , Rfl:     No results found for this or any previous visit (from the past 1008 hour(s))  The following portions of the patient's history were reviewed and updated as appropriate: allergies, current medications, past family history, past medical history, past social history, past surgical history and problem list      Review of Systems   Constitutional: Negative for appetite change, chills, diaphoresis, fatigue, fever and unexpected weight change  HENT: Negative for congestion, hearing loss and rhinorrhea  Eyes: Negative for visual disturbance  Respiratory: Negative for cough, chest tightness, shortness of breath and wheezing  Cardiovascular: Negative for chest pain, palpitations and leg swelling  Gastrointestinal: Negative for abdominal pain and blood in stool  Endocrine: Negative for cold intolerance, heat intolerance, polydipsia and polyuria  Genitourinary: Negative for difficulty urinating, dysuria, frequency and urgency  Musculoskeletal: Negative for arthralgias and myalgias  Skin: Negative for rash  Neurological: Negative for dizziness, weakness, light-headedness and headaches  Hematological: Does not bruise/bleed easily  Psychiatric/Behavioral: Positive for hallucinations  Negative for dysphoric mood and sleep disturbance  Objective:      Vitals:    04/06/22 1504   BP: 110/68   Pulse: 77   Resp: 16   Temp: 97 7 °F (36 5 °C)   SpO2: 95%          Physical Exam  Constitutional:       Appearance: She is well-developed  HENT:      Head: Normocephalic and atraumatic  Pulmonary:      Effort: Pulmonary effort is normal    Neurological:      Mental Status: She is alert and oriented to person, place, and time  Psychiatric:         Behavior: Behavior normal  Behavior is cooperative  Thought Content:  Thought content normal          Judgment: Judgment normal

## 2022-04-06 NOTE — PROGRESS NOTES
Assessment and Plan:     Problem List Items Addressed This Visit     None           Preventive health issues were discussed with patient, and age appropriate screening tests were ordered as noted in patient's After Visit Summary  Personalized health advice and appropriate referrals for health education or preventive services given if needed, as noted in patient's After Visit Summary       History of Present Illness:     Patient presents for Medicare Annual Wellness visit    Patient Care Team:  Chema Estrada MD as PCP - General (Internal Medicine)  Gricel Das MD as PCP - [address] [place] [address] (RTE)  Gricel Das MD as PCP - PCP-[facility] ([facility])  MD Mary Ochoa PA-C as Physician Assistant (Gastroenterology)  Donna Cole MD as Endoscopist     Problem List:     Patient Active Problem List   Diagnosis    Parkinson's disease [facility])    Scoliosis    Low back pain    Acquired hypothyroidism    Elevated antinuclear antibody (RAS) level    Family hx-breast malignancy    Mixed hyperlipidemia    Rosacea    Trochanteric bursitis    Urinary incontinence    Vitamin D deficiency    Flank pain    Irritable bowel syndrome with both constipation and diarrhea    REM behavioral disorder    Anxiety    Recurrent major depressive disorder, in partial remission ([address] )    Overweight    Osteopenia of multiple sites    Frequent UTI    Hallucination, visual    Dementia without behavioral disturbance ([address] )      Past Medical and Surgical History:     Past Medical History:   Diagnosis Date    Anxiety     BRCA1 negative 1980    BRCA2 negative 1980    Disease of thyroid gland     Generalized anxiety disorder 4/23/2015    Hepatic disease     Infectious viral hepatitis     Liver disease     Osteoporosis     Overweight (BMI 25 0-29 9) 2/12/2020    Parkinson's disease ([address] )     Patient refuses to disclose advance directive information 5/23/2018    Overview:  Yes, Patient instructed to provide copy of advance directive for provider to review and to be scanned into Electronic Medical Record  Overview:  Yes, Patient instructed to provide copy of advance directive for provider to review and to be scanned into Electronic Medical Record    Rosacea     Senile osteoporosis 5/27/2003    Shortness of breath      Past Surgical History:   Procedure Laterality Date    FOOT SURGERY Bilateral     HYSTERECTOMY      MT ESOPHAGOGASTRODUODENOSCOPY TRANSORAL DIAGNOSTIC N/A 9/5/2018    Procedure: EGD AND COLONOSCOPY;  Surgeon: Cyndy Garcia MD;  Location: [facility];   Service: Gastroenterology    URETHRA SURGERY      laparoscopic urethral suspension for stress incontinence      Family History:     Family History   Problem Relation Age of Onset    Lung cancer Mother     Dementia Mother     Lung cancer Father     Cancer Father     Esophageal cancer Brother     Cancer Brother     Cavazos's esophagus Brother     Esophageal cancer Family     Heart disease Family     Stroke Family         syndrome    No Known Problems Daughter     No Known Problems Maternal Grandmother     Breast cancer Paternal Grandmother 36    No Known Problems Paternal Aunt     Breast cancer Other 39    BRCA1 Positive Other 39    BRCA2 Positive Other 39      Social History:     Social History     Socioeconomic History    Marital status: Significant Other     Spouse name: None    Number of children: None    Years of education: Master's Degree    Highest education level: None   Occupational History     Comment: Retired   Tobacco Use    Smoking status: Never Smoker    Smokeless tobacco: Never Used   Vaping Use    Vaping Use: Never used   Substance and Sexual Activity    Alcohol use: Not Currently     Alcohol/week: 1 0 standard drink     Types: 1 Glasses of wine per week     Comment: Alcohol use per [employer]    Drug use: No    Sexual activity: Never   Other Topics Concern    None   Social History Narrative Denied:  History of caffeine use     Social Determinants of Health     Financial Resource Strain: Not on file   Food Insecurity: Not on file   Transportation Needs: Not on file   Physical Activity: Not on file   Stress: Not on file   Social Connections: Not on file   Intimate Partner Violence: Not on file   Housing Stability: Not on file      Medications and Allergies:     Current Outpatient Medications   Medication Sig Dispense Refill    Ascorbic Acid (vitamin C) 100 MG tablet Take by mouth      aspirin 81 mg chewable tablet Chew 1 tablet (81 mg total) daily      betamethasone dipropionate (DIPROSONE) 0 05 % cream Apply topically 2 (two) times a day      buPROPion (Wellbutrin XL) 150 mg 24 hr tablet Take 1 tablet (150 mg total) by mouth every morning 90 tablet 3    carbidopa-levodopa (SINEMET)  mg per tablet Take 2 tablets by mouth 4 (four) times a day 720 tablet 1    Cholecalciferol (VITAMIN D) 2000 units tablet 1 daily      Cranberry 1000 MG CAPS Take 4,200 mg by mouth as needed       escitalopram (LEXAPRO) 10 mg tablet Take 1 tablet (10 mg total) by mouth daily 30 tablet 5    Multiple Vitamin (MULTIVITAMINS PO) daily      clobetasol (TEMOVATE) 0 05 % external solution       escitalopram (LEXAPRO) 20 mg tablet Take 20 mg by mouth daily (Patient not taking: Reported on 4/6/2022 )      levothyroxine 50 mcg tablet Take 1 tablet (50 mcg total) by mouth daily 90 tablet 0    sulfamethoxazole-trimethoprim (BACTRIM DS) 800-160 mg per tablet Take 1 tablet by mouth every 12 (twelve) hours (Patient not taking: Reported on 4/6/2022 )       No current facility-administered medications for this visit       Allergies   Allergen Reactions    Statins Other (See Comments)     jaundice    Naproxen Hives    Simvastatin Other (See Comments)     Jaundice    Dust Mite Extract Other (See Comments)     Per pt states does not know the type of reaction    Pollen Extract Sneezing, Nasal Congestion, Cough and Itching Immunizations:     Immunization History   Administered Date(s) Administered    COVID-19 MODERNA VACC 0 5 ML IM 01/26/2021, 03/04/2021, 11/01/2021    H1N1 Inj 12/06/2009    H1N1, All Formulations 12/14/2009, 12/14/2009    Hep A, adult 06/06/1996, 05/22/1997    INFLUENZA 12/04/2006, 11/07/2007, 10/03/2008, 02/12/2010, 09/15/2010, 09/21/2011, 10/25/2012, 11/01/2013, 10/15/2014, 10/05/2015, 10/02/2016, 11/13/2017, 09/18/2018    Influenza Quadrivalent 3 years and older 10/05/2015    Influenza Quadrivalent 6 mos and older IM 09/18/2018    Influenza, high dose seasonal 0 7 mL 09/22/2020    Influenza, seasonal, injectable 12/04/2006, 11/07/2007, 10/03/2008, 02/12/2010, 09/15/2010, 09/21/2011, 10/25/2012, 11/01/2013, 10/15/2014    Pneumococcal Conjugate 13-Valent 05/03/2016    Pneumococcal Polysaccharide PPV23 03/17/2011    Td (adult), adsorbed 06/10/2002    Tdap 10/25/2012    Zoster 09/12/2012    influenza, trivalent, adjuvanted 10/14/2019      Health Maintenance:         Topic Date Due    DXA SCAN  06/10/2022    Hepatitis C Screening  Completed         Topic Date Due    Influenza Vaccine (1) 09/01/2021      Medicare Health Risk Assessment:     /68 (BP Location: Left arm, Patient Position: Sitting, Cuff Size: Standard)   Pulse 77   Temp 97 7 °F (36 5 °C) (Tympanic)   Resp 16   Ht 5' 3" (1 6 m)   Wt 60 7 kg (133 lb 12 8 oz)   SpO2 95%   BMI 23 70 kg/m²      Ralls is here for her Subsequent Wellness visit  Health Risk Assessment:   Patient rates overall health as fair  Patient feels that their physical health rating is slightly worse  Patient is dissatisfied with their life  Eyesight was rated as slightly worse  Hearing was rated as same  Patient feels that their emotional and mental health rating is slightly worse  Patients states they are sometimes angry  Patient states they are often unusually tired/fatigued  Pain experienced in the last 7 days has been some   Patient's pain rating has been 6/10  Patient states that she has experienced no weight loss or gain in last 6 months  Fall Risk Screening: In the past year, patient has experienced: no history of falling in past year      Urinary Incontinence Screening:   Patient has leaked urine accidently in the last six months  Home Safety:  Patient does not have trouble with stairs inside or outside of their home  Patient has working smoke alarms and has working carbon monoxide detector  Home safety hazards include: none  Nutrition:   Current diet is Regular  Medications:   Patient is not currently taking any over-the-counter supplements  Patient is not able to manage medications  Activities of Daily Living (ADLs)/Instrumental Activities of Daily Living (IADLs):   Walk and transfer into and out of bed and chair?: Yes  Dress and groom yourself?: Yes    Bathe or shower yourself?: No    Feed yourself?  Yes  Do your laundry/housekeeping?: No  Manage your money, pay your bills and track your expenses?: No  Make your own meals?: No    Do your own shopping?: No    Previous Hospitalizations:   Any hospitalizations or ED visits within the last 12 months?: Yes    How many hospitalizations have you had in the last year?: 1-2    Advance Care Planning:   Living will: Yes    Advanced directive: Yes      Cognitive Screening:   Provider or family/friend/caregiver concerned regarding cognition?: Yes    Cognition Comments: Unable to complete d/t dementia    PREVENTIVE SCREENINGS      Cardiovascular Screening:    General: Screening Not Indicated and History Lipid Disorder      Diabetes Screening:     General: Screening Current      Colorectal Cancer Screening:     General: Screening Not Indicated      Breast Cancer Screening:     General: Screening Current      Cervical Cancer Screening:    General: Screening Not Indicated      Osteoporosis Screening:    General: Screening Current      Abdominal Aortic Aneurysm (AAA) Screening:        General: Screening Not Indicated      Lung Cancer Screening:     General: Screening Not Indicated      Hepatitis C Screening:    General: Screening Current    Screening, Brief Intervention, and Referral to Treatment (SBIRT)    Screening  Typical number of drinks in a day: 0  Typical number of drinks in a week: 0  Interpretation: Low risk drinking behavior      Single Item Drug Screening:  How often have you used an illegal drug (including marijuana) or a prescription medication for non-medical reasons in the past year? never    Single Item Drug Screen Score: 0  Interpretation: Negative screen for possible drug use disorder      Maria Teresa Friend MD

## 2022-04-06 NOTE — PATIENT INSTRUCTIONS
Depressive symptoms have actually worsened over the last month since decreasing Lexapro from 20-10 mg and adding Wellbutrin 150 mg daily  Patient has not been noted to have increased motivation or engagement in activities but has clearly become more tearful over last month  Neupro was also weaned to off during this same time       She has been experiencing more frequent visual hallucinations  And neurology has discussed dx Capgras  My plan would be to increase lexapro back to 20 mg and keep 150 mg Wellbutrin XL, but I will confer w/ Dr Lilian Greene first as she had mentioned considering seroquel or Nuplazid  I will d/w [place] and Family subseqeunt to this conversation  Recurrent UTIs-will start methenamine      Medicare Preventive Visit Patient Instructions  Thank you for completing your Welcome to Medicare Visit or Medicare Annual Wellness Visit today  Your next wellness visit will be due in one year (4/7/2023)  The screening/preventive services that you may require over the next 5-10 years are detailed below  Some tests may not apply to you based off risk factors and/or age  Screening tests ordered at today's visit but not completed yet may show as past due  Also, please note that scanned in results may not display below  Preventive Screenings:  Service Recommendations Previous Testing/Comments   Colorectal Cancer Screening  * Colonoscopy    * Fecal Occult Blood Test (FOBT)/Fecal Immunochemical Test (FIT)  * Fecal DNA/Cologuard Test  * Flexible Sigmoidoscopy Age: 54-65 years old   Colonoscopy: every 10 years (may be performed more frequently if at higher risk)  OR  FOBT/FIT: every 1 year  OR  Cologuard: every 3 years  OR  Sigmoidoscopy: every 5 years  Screening may be recommended earlier than age 48 if at higher risk for colorectal cancer  Also, an individualized decision between you and your healthcare provider will decide whether screening between the ages of 74-80 would be appropriate   Colonoscopy: Not on file  FOBT/FIT: Not on file  Cologuard: Not on file  Sigmoidoscopy: Not on file          Breast Cancer Screening Age: 36 years old  Frequency: every 1-2 years  Not required if history of left and right mastectomy Mammogram: 09/17/2021    Screening Current   Cervical Cancer Screening Between the ages of 21-29, pap smear recommended once every 3 years  Between the ages of 33-67, can perform pap smear with HPV co-testing every 5 years  Recommendations may differ for women with a history of total hysterectomy, cervical cancer, or abnormal pap smears in past  Pap Smear: Not on file    Screening Not Indicated   Hepatitis C Screening Once for adults born between 1945 and 1965  More frequently in patients at high risk for Hepatitis C Hep C Antibody: 12/31/2019    Screening Current   Diabetes Screening 1-2 times per year if you're at risk for diabetes or have pre-diabetes Fasting glucose: 96 mg/dL   A1C: 5 7 %    Screening Current   Cholesterol Screening Once every 5 years if you don't have a lipid disorder  May order more often based on risk factors  Lipid panel: 01/14/2022    Screening Not Indicated  History Lipid Disorder     Other Preventive Screenings Covered by Medicare:  1  Abdominal Aortic Aneurysm (AAA) Screening: covered once if your at risk  You're considered to be at risk if you have a family history of AAA  2  Lung Cancer Screening: covers low dose CT scan once per year if you meet all of the following conditions: (1) Age 50-69; (2) No signs or symptoms of lung cancer; (3) Current smoker or have quit smoking within the last 15 years; (4) You have a tobacco smoking history of at least 30 pack years (packs per day multiplied by number of years you smoked); (5) You get a written order from a healthcare provider    3  Glaucoma Screening: covered annually if you're considered high risk: (1) You have diabetes OR (2) Family history of glaucoma OR (3)  aged 48 and older OR (3)  American aged 72 and older  4  Osteoporosis Screening: covered every 2 years if you meet one of the following conditions: (1) You're estrogen deficient and at risk for osteoporosis based off medical history and other findings; (2) Have a vertebral abnormality; (3) On glucocorticoid therapy for more than 3 months; (4) Have primary hyperparathyroidism; (5) On osteoporosis medications and need to assess response to drug therapy  · Last bone density test (DXA Scan): 06/10/2020   5  HIV Screening: covered annually if you're between the age of 15-65  Also covered annually if you are younger than 13 and older than 72 with risk factors for HIV infection  For pregnant patients, it is covered up to 3 times per pregnancy  Immunizations:  Immunization Recommendations   Influenza Vaccine Annual influenza vaccination during flu season is recommended for all persons aged >= 6 months who do not have contraindications   Pneumococcal Vaccine (Prevnar and Pneumovax)  * Prevnar = PCV13  * Pneumovax = PPSV23   Adults 25-60 years old: 1-3 doses may be recommended based on certain risk factors  Adults 72 years old: Prevnar (PCV13) vaccine recommended followed by Pneumovax (PPSV23) vaccine  If already received PPSV23 since turning 65, then PCV13 recommended at least one year after PPSV23 dose  Hepatitis B Vaccine 3 dose series if at intermediate or high risk (ex: diabetes, end stage renal disease, liver disease)   Tetanus (Td) Vaccine - COST NOT COVERED BY MEDICARE PART B Following completion of primary series, a booster dose should be given every 10 years to maintain immunity against tetanus  Td may also be given as tetanus wound prophylaxis  Tdap Vaccine - COST NOT COVERED BY MEDICARE PART B Recommended at least once for all adults  For pregnant patients, recommended with each pregnancy     Shingles Vaccine (Shingrix) - COST NOT COVERED BY MEDICARE PART B  2 shot series recommended in those aged 48 and above     Health Maintenance Due: Topic Date Due    DXA SCAN  06/10/2022    Hepatitis C Screening  Completed     Immunizations Due:      Topic Date Due    Influenza Vaccine (1) 09/01/2021     Advance Directives   What are advance directives? Advance directives are legal documents that state your wishes and plans for medical care  These plans are made ahead of time in case you lose your ability to make decisions for yourself  Advance directives can apply to any medical decision, such as the treatments you want, and if you want to donate organs  What are the types of advance directives? There are many types of advance directives, and each state has rules about how to use them  You may choose a combination of any of the following:  · Living will: This is a written record of the treatment you want  You can also choose which treatments you do not want, which to limit, and which to stop at a certain time  This includes surgery, medicine, IV fluid, and tube feedings  · Durable power of  for healthcare [facility]): This is a written record that states who you want to make healthcare choices for you when you are unable to make them for yourself  This person, called a proxy, is usually a family member or a friend  You may choose more than 1 proxy  · Do not resuscitate (DNR) order:  A DNR order is used in case your heart stops beating or you stop breathing  It is a request not to have certain forms of treatment, such as CPR  A DNR order may be included in other types of advance directives  · Medical directive: This covers the care that you want if you are in a coma, near death, or unable to make decisions for yourself  You can list the treatments you want for each condition  Treatment may include pain medicine, surgery, blood transfusions, dialysis, IV or tube feedings, and a ventilator (breathing machine)  · Values history: This document has questions about your views, beliefs, and how you feel and think about life   This information can help others choose the care that you would choose  Why are advance directives important? An advance directive helps you control your care  Although spoken wishes may be used, it is better to have your wishes written down  Spoken wishes can be misunderstood, or not followed  Treatments may be given even if you do not want them  An advance directive may make it easier for your family to make difficult choices about your care  Urinary Incontinence   Urinary incontinence (UI)  is when you lose control of your bladder  UI develops because your bladder cannot store or empty urine properly  The 3 most common types of UI are stress incontinence, urge incontinence, or both  Medicines:   · May be given to help strengthen your bladder control  Report any side effects of medication to your healthcare provider  Do pelvic muscle exercises often:  Your pelvic muscles help you stop urinating  Squeeze these muscles tight for 5 seconds, then relax for 5 seconds  Gradually work up to squeezing for 10 seconds  Do 3 sets of 15 repetitions a day, or as directed  This will help strengthen your pelvic muscles and improve bladder control  Train your bladder:  Go to the bathroom at set times, such as every 2 hours, even if you do not feel the urge to go  You can also try to hold your urine when you feel the urge to go  For example, hold your urine for 5 minutes when you feel the urge to go  As that becomes easier, hold your urine for 10 minutes  Self-care:   · Keep a UI record  Write down how often you leak urine and how much you leak  Make a note of what you were doing when you leaked urine  · Drink liquids as directed  You may need to limit the amount of liquid you drink to help control your urine leakage  Do not drink any liquid right before you go to bed  Limit or do not have drinks that contain caffeine or alcohol  · Prevent constipation  Eat a variety of high-fiber foods   Good examples are high-fiber cereals, beans, vegetables, and whole-grain breads  Walking is the best way to trigger your intestines to have a bowel movement  · Exercise regularly and maintain a healthy weight  Weight loss and exercise will decrease pressure on your bladder and help you control your leakage  · Use a catheter as directed  to help empty your bladder  A catheter is a tiny, plastic tube that is put into your bladder to drain your urine  · Go to behavior therapy as directed  Behavior therapy may be used to help you learn to control your urge to urinate  © Copyright [employer] 2018 Information is for End User's use only and may not be sold, redistributed or otherwise used for commercial purposes   All illustrations and images included in CareNotes® are the copyrighted property of A D A M , Inc  or [address] [place] [address] [employer]

## 2022-04-18 ENCOUNTER — TELEPHONE (OUTPATIENT)
Dept: INTERNAL MEDICINE CLINIC | Facility: CLINIC | Age: 77
End: 2022-04-18

## 2022-04-18 NOTE — TELEPHONE ENCOUNTER
----- Message from Sheridan Clark MD sent at 4/17/2022  5:47 AM EDT -----  No other changes now, just increase the lexapro, f/u 1 mo  ----- Message -----  From: Hattie Bunch  Sent: 4/15/2022   8:09 AM EDT  To: Sheridan Clark MD    Daughter is asking does that mean they drop the wellbutrin? And what decision was made on the rivastigmine    ----- Message -----  From: Sheridan Clark MD  Sent: 4/14/2022   5:38 PM EDT  To: [facility] Skinner [facility] Internal Med Clinical     Please notify significant other [facility] and daughter Tomi Hyatt that I heard back from Dr Newton Nageotte and she is in agreement with increasing the Lexapro  Please have patient follow-up with me in a month  I sent the prescription to pharmacy for increased dose

## 2022-04-26 ENCOUNTER — SOCIAL WORK (OUTPATIENT)
Dept: BEHAVIORAL/MENTAL HEALTH CLINIC | Age: 77
End: 2022-04-26
Payer: COMMERCIAL

## 2022-04-26 DIAGNOSIS — F41.9 ANXIETY: ICD-10-CM

## 2022-04-26 DIAGNOSIS — F02.80 DEMENTIA ASSOCIATED WITH OTHER UNDERLYING DISEASE WITHOUT BEHAVIORAL DISTURBANCE (HCC): ICD-10-CM

## 2022-04-26 DIAGNOSIS — F33.41 RECURRENT MAJOR DEPRESSIVE DISORDER, IN PARTIAL REMISSION (HCC): Primary | ICD-10-CM

## 2022-04-26 PROCEDURE — 90834 PSYTX W PT 45 MINUTES: CPT | Performed by: SOCIAL WORKER

## 2022-04-26 NOTE — PSYCH
Start Time 3:50PM-4:35PM   Psychotherapy Provided: Individual Psychotherapy 45 minutes   Length of time in session: 45 minutes, follow up in 4-5 weeks    No diagnosis found  Anxiety, MDD and dementia  Goals addressed in session: Goal 1   Pain:  None  none  0  Current suicide risk : Low   D: Nadeem Matias was upset at the start of the visit s she states that her partner has been being mean to her  She states that her kids are in [place] for a month  She feels her partner is being controlling which is in contrast to the truth  She says her boyfriend always says the right thing and walks away with the prize  She is confused  She is well dressed, well groomed, wearing glasses and in a wheelchair  Thought process is confused and speech is normal rate and normal volume  Mood is depressed and affect is congruent  She appears to be stuck in the past and she needed to be brought to the here and now  She feels stuck as she is dependent on her partner  She states she is taking her daughter to a concert in [place]  She states that she is getting used to [address]  Her depression is about the same as evidenced by self reports  Screening not performed due to the thought process of the patient,  Her conversations incorporate the present with the past   She seems to be confusing her ex 's behaviors with her current partner as evidenced by her her blaming her current partner for things he could not have done  Support was offered  Sashagregg Adair appears to be easily agitated when she cannot remember or remembers incorrectly and is reminded of the facts as evidenced by her complaints of this by her current partner  She engaged when this writer asked her questions about the past     Severa Bon to follow up in 4-5 weeks with this writer  She will contact her children if she feels something is not right with them so she does not feel like her partner is being controlling           [address]: Diagnosis and Treatment Plan explained to Darrel Koyanagi relates understanding diagnosis and is agreeable to Treatment Plan   Yes

## 2022-05-05 ENCOUNTER — TELEPHONE (OUTPATIENT)
Dept: INTERNAL MEDICINE CLINIC | Facility: CLINIC | Age: 77
End: 2022-05-05

## 2022-05-05 ENCOUNTER — OFFICE VISIT (OUTPATIENT)
Dept: INTERNAL MEDICINE CLINIC | Facility: CLINIC | Age: 77
End: 2022-05-05
Payer: COMMERCIAL

## 2022-05-05 ENCOUNTER — APPOINTMENT (OUTPATIENT)
Dept: RADIOLOGY | Facility: CLINIC | Age: 77
End: 2022-05-05
Payer: COMMERCIAL

## 2022-05-05 VITALS
DIASTOLIC BLOOD PRESSURE: 70 MMHG | BODY MASS INDEX: 23.88 KG/M2 | WEIGHT: 134.8 LBS | RESPIRATION RATE: 14 BRPM | HEART RATE: 80 BPM | HEIGHT: 63 IN | SYSTOLIC BLOOD PRESSURE: 110 MMHG

## 2022-05-05 DIAGNOSIS — M25.559 HIP PAIN: ICD-10-CM

## 2022-05-05 DIAGNOSIS — M25.559 HIP PAIN: Primary | ICD-10-CM

## 2022-05-05 DIAGNOSIS — N18.31 STAGE 3A CHRONIC KIDNEY DISEASE (HCC): ICD-10-CM

## 2022-05-05 PROBLEM — I50.9 HEART FAILURE, UNSPECIFIED HF CHRONICITY, UNSPECIFIED HEART FAILURE TYPE (HCC): Status: ACTIVE | Noted: 2022-05-05

## 2022-05-05 PROCEDURE — 1036F TOBACCO NON-USER: CPT | Performed by: NURSE PRACTITIONER

## 2022-05-05 PROCEDURE — 1160F RVW MEDS BY RX/DR IN RCRD: CPT | Performed by: NURSE PRACTITIONER

## 2022-05-05 PROCEDURE — 73522 X-RAY EXAM HIPS BI 3-4 VIEWS: CPT

## 2022-05-05 PROCEDURE — 72220 X-RAY EXAM SACRUM TAILBONE: CPT

## 2022-05-05 PROCEDURE — 99213 OFFICE O/P EST LOW 20 MIN: CPT | Performed by: NURSE PRACTITIONER

## 2022-05-05 RX ORDER — SENNOSIDES 8.6 MG
CAPSULE ORAL
Qty: 30 TABLET | Refills: 0 | Status: SHIPPED | OUTPATIENT
Start: 2022-05-05

## 2022-05-05 NOTE — PROGRESS NOTES
Assessment/Plan:    Patient Instructions    Status post fall, now complaining of sacral and hip pain, check x-rays start Tylenol- cushion for wheelchair         Diagnoses and all orders for this visit:    Hip pain  -     XR hips bilateral 3-4 vw w pelvis if performed; Future  -     XR sacrum and coccyx; Future  -     acetaminophen (TYLENOL) 650 mg CR tablet; Take 650mg tid x 5 days then tid prn for pain    Stage 3a chronic kidney disease ([facility] [address] )       Subjective:      Patient ID: Jennifer Winslow is a 69 y o  female     Last Friday into Saturday she slipped out of bed falling onto the floor falling on her bottom, she states several days later she started to notice an ache in her lower back sacral area and into her left hip  She is not taking anything for pain she [facility]  She is weak here with her   He her history is correct          Current Outpatient Medications:     aspirin 81 mg chewable tablet, Chew 1 tablet (81 mg total) daily, Disp: , Rfl:     betamethasone dipropionate (DIPROSONE) 0 05 % cream, Apply topically 2 (two) times a day, Disp: , Rfl:     buPROPion (Wellbutrin XL) 150 mg 24 hr tablet, Take 1 tablet (150 mg total) by mouth every morning, Disp: 90 tablet, Rfl: 3    carbidopa-levodopa (SINEMET)  mg per tablet, Take 2 tablets by mouth 4 (four) times a day, Disp: 720 tablet, Rfl: 1    Cholecalciferol (VITAMIN D) 2000 units tablet, 1 daily, Disp: , Rfl:     Cranberry 1000 MG CAPS, Take 4,200 mg by mouth as needed , Disp: , Rfl:     escitalopram (LEXAPRO) 20 mg tablet, Take 20 mg by mouth daily  , Disp: , Rfl:     levothyroxine 50 mcg tablet, Take 1 tablet (50 mcg total) by mouth daily, Disp: 90 tablet, Rfl: 0    methenamine hippurate (HIPREX) 1 g tablet, Take 1 tablet (1 g total) by mouth 2 (two) times a day with meals, Disp: 60 tablet, Rfl: 5    Multiple Vitamin (MULTIVITAMINS PO), daily, Disp: , Rfl:     acetaminophen (TYLENOL) 650 mg CR tablet, Take 650mg tid x 5 days then tid prn for pain, Disp: 30 tablet, Rfl: 0    Ascorbic Acid (vitamin C) 100 MG tablet, Take by mouth (Patient not taking: Reported on 5/5/2022 ), Disp: , Rfl:     clobetasol (TEMOVATE) 0 05 % external solution, , Disp: , Rfl:     escitalopram (LEXAPRO) 20 mg tablet, Take 1 tablet (20 mg total) by mouth daily (Patient not taking: Reported on 5/5/2022 ), Disp: 30 tablet, Rfl: 5    sulfamethoxazole-trimethoprim (BACTRIM DS) 800-160 mg per tablet, Take 1 tablet by mouth every 12 (twelve) hours (Patient not taking: Reported on 4/6/2022 ), Disp: , Rfl:     No results found for this or any previous visit (from the past 1008 hour(s))  The following portions of the patient's history were reviewed and updated as appropriate: allergies, current medications, past family history, past medical history, past social history, past surgical history and problem list      Review of Systems   Constitutional: Negative for appetite change, chills, diaphoresis, fatigue, fever and unexpected weight change  HENT: Negative for postnasal drip and sneezing  Eyes: Negative for visual disturbance  Respiratory: Negative for chest tightness and shortness of breath  Cardiovascular: Negative for chest pain, palpitations and leg swelling  Gastrointestinal: Negative for abdominal pain and blood in stool  Endocrine: Negative for cold intolerance, heat intolerance, polydipsia, polyphagia and polyuria  Genitourinary: Negative for difficulty urinating, dysuria, frequency and urgency  Musculoskeletal: Negative for arthralgias and myalgias  Sacral pain   Skin: Negative for rash and wound  Neurological: Negative for dizziness, weakness, light-headedness and headaches  Hematological: Negative for adenopathy  Psychiatric/Behavioral: Negative for confusion, dysphoric mood and sleep disturbance  The patient is not nervous/anxious            Objective:      /70 (BP Location: Left arm, Patient Position: Sitting, Cuff Size: Standard)   Pulse 80   Resp 14   Ht 5' 3" (1 6 m)   Wt 61 1 kg (134 lb 12 8 oz)   BMI 23 88 kg/m²        Physical Exam  Constitutional:       General: She is not in acute distress  Appearance: She is well-developed  She is not diaphoretic  HENT:      Head: Normocephalic and atraumatic  Nose: Nose normal    Eyes:      Conjunctiva/sclera: Conjunctivae normal       Pupils: Pupils are equal, round, and reactive to light  Neck:      Thyroid: No thyromegaly  Vascular: No JVD  Trachea: No tracheal deviation  Cardiovascular:      Rate and Rhythm: Normal rate and regular rhythm  Heart sounds: Normal heart sounds  No murmur heard  No friction rub  No gallop  Pulmonary:      Effort: Pulmonary effort is normal  No respiratory distress  Breath sounds: Normal breath sounds  No wheezing or rales  Abdominal:      General: Bowel sounds are normal  There is no distension  Palpations: Abdomen is soft  Tenderness: There is no abdominal tenderness  Musculoskeletal:         General: Normal range of motion  Cervical back: Normal range of motion and neck supple  Comments:   Patient has point tenderness to sacral and left hip no bruising noted   Lymphadenopathy:      Cervical: No cervical adenopathy  Skin:     General: Skin is warm and dry  Findings: No rash  Neurological:      Mental Status: She is alert and oriented to person, place, and time  Cranial Nerves: No cranial nerve deficit  Psychiatric:         Behavior: Behavior normal          Thought Content:  Thought content normal          Judgment: Judgment normal

## 2022-05-05 NOTE — PATIENT INSTRUCTIONS
Status post fall, now complaining of sacral and hip pain, check x-rays start Tylenol- cushion for wheelchair

## 2022-05-05 NOTE — TELEPHONE ENCOUNTER
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

## 2022-05-06 ENCOUNTER — TELEPHONE (OUTPATIENT)
Dept: INTERNAL MEDICINE CLINIC | Facility: CLINIC | Age: 77
End: 2022-05-06

## 2022-05-06 NOTE — TELEPHONE ENCOUNTER
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

## 2022-06-07 ENCOUNTER — SOCIAL WORK (OUTPATIENT)
Dept: BEHAVIORAL/MENTAL HEALTH CLINIC | Age: 77
End: 2022-06-07
Payer: COMMERCIAL

## 2022-06-07 DIAGNOSIS — F02.80 DEMENTIA ASSOCIATED WITH OTHER UNDERLYING DISEASE WITHOUT BEHAVIORAL DISTURBANCE (HCC): ICD-10-CM

## 2022-06-07 DIAGNOSIS — F41.9 ANXIETY: ICD-10-CM

## 2022-06-07 DIAGNOSIS — F33.41 RECURRENT MAJOR DEPRESSIVE DISORDER, IN PARTIAL REMISSION (HCC): Primary | ICD-10-CM

## 2022-06-07 PROCEDURE — 90834 PSYTX W PT 45 MINUTES: CPT | Performed by: SOCIAL WORKER

## 2022-06-07 NOTE — PSYCH
Start Time 2:00PM-2:45PM  Psychotherapy Provided: Individual Psychotherapy 45 minutes   Length of time in session: 45 minutes, follow up in 3-4  week    No diagnosis found  Goals addressed in session: Goal 1   Pain:  0    none  0  Current suicide risk : Jeff Alexander is convinced that her long time boyfriend does not love her  Her children are busy with their own lives and she does not see much of them  She told her daughter that she needed help  She enjoys talking to her younger son when they do connect  She does not like living at [facility]  She feels like she needs help, when she wants them to take out the garbage  She has made friends at [facility]  Lawerance Migdalia gets confused between the past and the present which is congruent to her diagnosis  Lawerance Migdalia is appropriately dressed and appropriately groomed, being in a wheelchair (due to fall risk)   Her mood is irritable and affect is congruent  Her thought process is inconsistent and her speech is normal rate and normal volume  She spoke of making mistakes raising her children who are now adults  She was able to accept that they are making adult decisions  She then stated her daughter visits her on her days off  Augusto Gooden appears to be stuck as she is half in the here and now and half in the past   Lawerance Migdalia is hyper focused on her partner not being there at night and does not have the ability to understand that he cannot be there all night as he does not live at [facility]  P  This writer will speak to caregiver about therapy possibly not being appropriate at this time  Behavioral Health Treatment Plan [facility]: Diagnosis and Treatment Plan explained to Koby Gutierrez relates understanding diagnosis and is agreeable to Treatment Plan   Yes

## 2022-06-13 ENCOUNTER — OFFICE VISIT (OUTPATIENT)
Dept: INTERNAL MEDICINE CLINIC | Facility: CLINIC | Age: 77
End: 2022-06-13
Payer: COMMERCIAL

## 2022-06-13 VITALS
HEART RATE: 80 BPM | TEMPERATURE: 98.2 F | WEIGHT: 129.6 LBS | OXYGEN SATURATION: 95 % | HEIGHT: 63 IN | BODY MASS INDEX: 22.96 KG/M2 | DIASTOLIC BLOOD PRESSURE: 66 MMHG | RESPIRATION RATE: 16 BRPM | SYSTOLIC BLOOD PRESSURE: 108 MMHG

## 2022-06-13 DIAGNOSIS — F02.80 DEMENTIA ASSOCIATED WITH OTHER UNDERLYING DISEASE WITHOUT BEHAVIORAL DISTURBANCE (HCC): ICD-10-CM

## 2022-06-13 DIAGNOSIS — N39.0 FREQUENT UTI: ICD-10-CM

## 2022-06-13 DIAGNOSIS — G20 PARKINSON'S DISEASE (HCC): Primary | ICD-10-CM

## 2022-06-13 DIAGNOSIS — F33.41 RECURRENT MAJOR DEPRESSIVE DISORDER, IN PARTIAL REMISSION (HCC): ICD-10-CM

## 2022-06-13 DIAGNOSIS — F41.9 ANXIETY: ICD-10-CM

## 2022-06-13 DIAGNOSIS — R26.9 NEUROLOGIC GAIT DYSFUNCTION: ICD-10-CM

## 2022-06-13 DIAGNOSIS — Z78.0 MENOPAUSE: ICD-10-CM
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
## 2022-06-13 PROCEDURE — 1036F TOBACCO NON-USER: CPT | Performed by: INTERNAL MEDICINE

## 2022-06-13 PROCEDURE — 1160F RVW MEDS BY RX/DR IN RCRD: CPT | Performed by: INTERNAL MEDICINE

## 2022-06-13 PROCEDURE — 99214 OFFICE O/P EST MOD 30 MIN: CPT | Performed by: INTERNAL MEDICINE

## 2022-06-13 NOTE — PATIENT INSTRUCTIONS
Depression and anxiety have improved, stable on Lexapro 20 mg daily and Wellbutrin  mg daily, continue with same  Parkinson's with gait dysfunction and multiple falls-refer for physical therapy and occupational therapy  Patient has been advised to use Rollator 100% of the time when ambulating

## 2022-06-13 NOTE — PROGRESS NOTES
Assessment/Plan:    Diagnoses and all orders for this visit:    Parkinson's disease [facility])  -     Ambulatory Referral to Physical Therapy; Future  -     Ambulatory Referral to Occupational Therapy; Future    Dementia associated with other underlying disease without behavioral disturbance (HCC)    Anxiety    Recurrent major depressive disorder, in partial remission [facility])    Neurologic gait dysfunction  -     Ambulatory Referral to Physical Therapy; Future  -     Ambulatory Referral to Occupational Therapy; Future    Fall, initial encounter  -     Ambulatory Referral to Physical Therapy; Future  -     Ambulatory Referral to Occupational Therapy; Future    Menopause  -     DXA bone density spine hip and pelvis; Future    Frequent UTI              Patient Instructions   Depression and anxiety have improved, stable on Lexapro 20 mg daily and Wellbutrin  mg daily, continue with same  Parkinson's with gait dysfunction and multiple falls-refer for physical therapy and occupational therapy  Patient has been advised to use Rollator 100% of the time when ambulating  Subjective:      Patient ID: Calre De Guzman is a 69 y o  female    Follow-up multiple medical problems accompanied by Melonie Cantu and Caridad      Depression and anxiety-patient's mood has improved  She says she still feels tired throughout the day and lacks motivation but Melonie Cantu and Jessica both agree that her spirits are better  Parkinson's disease with gait dysfunction-patient is very upset that she is told to sit down every time she stands up at [facility]  She has had multiple falls most recently last night  She was backing up to get to bed and felt backward without injury  She has been working with physical therapy and occupational therapy, notably paying out of pocket 1200 dollars per month  She spends most of her day in the wheelchair, then tries to ambulate independently at times    She walks well with a walker with physical therapy but dense ventures off without it  She has a Rollator at home but it was never brought to [place] [id number]  Recurrent UTI-no dysuria or symptoms of UTI since starting methenamine        Current Outpatient Medications:     acetaminophen (TYLENOL) 650 mg CR tablet, Take 650mg tid x 5 days then tid prn for pain, Disp: 30 tablet, Rfl: 0    Ascorbic Acid (vitamin C) 100 MG tablet, Take by mouth, Disp: , Rfl:     aspirin 81 mg chewable tablet, Chew 1 tablet (81 mg total) daily, Disp: , Rfl:     buPROPion (Wellbutrin XL) 150 mg 24 hr tablet, Take 1 tablet (150 mg total) by mouth every morning, Disp: 90 tablet, Rfl: 3    carbidopa-levodopa (SINEMET)  mg per tablet, Take 2 tablets by mouth 4 (four) times a day, Disp: 720 tablet, Rfl: 1    Cholecalciferol (VITAMIN D) 2000 units tablet, 1 daily, Disp: , Rfl:     Cranberry 1000 MG CAPS, Take 4,200 mg by mouth as needed , Disp: , Rfl:     escitalopram (LEXAPRO) 20 mg tablet, Take 20 mg by mouth daily  , Disp: , Rfl:     methenamine hippurate (HIPREX) 1 g tablet, Take 1 tablet (1 g total) by mouth 2 (two) times a day with meals, Disp: 60 tablet, Rfl: 5    Multiple Vitamin (MULTIVITAMINS PO), daily, Disp: , Rfl:     betamethasone dipropionate (DIPROSONE) 0 05 % cream, Apply topically 2 (two) times a day, Disp: , Rfl:     clobetasol (TEMOVATE) 0 05 % external solution, , Disp: , Rfl:     escitalopram (LEXAPRO) 20 mg tablet, Take 1 tablet (20 mg total) by mouth daily, Disp: 30 tablet, Rfl: 5    levothyroxine 50 mcg tablet, Take 1 tablet (50 mcg total) by mouth daily, Disp: 90 tablet, Rfl: 0    sulfamethoxazole-trimethoprim (BACTRIM DS) 800-160 mg per tablet, Take 1 tablet by mouth every 12 (twelve) hours, Disp: , Rfl:     No results found for this or any previous visit (from the past 1008 hour(s))      The following portions of the patient's history were reviewed and updated as appropriate: allergies, current medications, past family history, past medical history, past social history, past surgical history and problem list      Review of Systems   Constitutional: Negative for appetite change, chills, diaphoresis, fatigue, fever and unexpected weight change  HENT: Negative for congestion, hearing loss and rhinorrhea  Eyes: Negative for visual disturbance  Respiratory: Negative for cough, chest tightness, shortness of breath and wheezing  Cardiovascular: Negative for chest pain, palpitations and leg swelling  Gastrointestinal: Negative for abdominal pain and blood in stool  Endocrine: Negative for cold intolerance, heat intolerance, polydipsia and polyuria  Genitourinary: Negative for difficulty urinating, dysuria, frequency and urgency  Musculoskeletal: Positive for gait problem  Negative for arthralgias and myalgias  Skin: Negative for rash  Neurological: Negative for dizziness, weakness, light-headedness and headaches  Hematological: Does not bruise/bleed easily  Psychiatric/Behavioral: Positive for decreased concentration  Negative for dysphoric mood and sleep disturbance  Objective:      Vitals:    06/13/22 1312   BP: 108/66   Pulse: 80   Resp: 16   Temp: 98 2 °F (36 8 °C)   SpO2: 95%          Physical Exam  Constitutional:       Appearance: She is well-developed  HENT:      Head: Normocephalic and atraumatic  Nose: Nose normal    Eyes:      General: No scleral icterus  Conjunctiva/sclera: Conjunctivae normal       Pupils: Pupils are equal, round, and reactive to light  Neck:      Thyroid: No thyromegaly  Vascular: No JVD  Trachea: No tracheal deviation  Cardiovascular:      Rate and Rhythm: Normal rate and regular rhythm  Heart sounds: No murmur heard  No friction rub  No gallop  Pulmonary:      Effort: Pulmonary effort is normal  No respiratory distress  Breath sounds: Normal breath sounds  No wheezing or rales  Musculoskeletal:         General: No deformity        Cervical back: Normal range of motion and neck supple  Lymphadenopathy:      Cervical: No cervical adenopathy  Skin:     General: Skin is warm and dry  Coloration: Skin is not pale  Findings: No erythema or rash  Neurological:      Mental Status: She is alert and oriented to person, place, and time  Cranial Nerves: No cranial nerve deficit  Psychiatric:         Behavior: Behavior normal          Thought Content:  Thought content normal          Judgment: Judgment normal

## 2022-07-01 ENCOUNTER — OFFICE VISIT (OUTPATIENT)
Dept: INTERNAL MEDICINE CLINIC | Facility: CLINIC | Age: 77
End: 2022-07-01
Payer: COMMERCIAL

## 2022-07-01 VITALS
RESPIRATION RATE: 16 BRPM | SYSTOLIC BLOOD PRESSURE: 120 MMHG | BODY MASS INDEX: 22.75 KG/M2 | DIASTOLIC BLOOD PRESSURE: 86 MMHG | HEART RATE: 78 BPM | HEIGHT: 63 IN | WEIGHT: 128.4 LBS

## 2022-07-01 DIAGNOSIS — R01.1 MURMUR: Primary | ICD-10-CM

## 2022-07-01 DIAGNOSIS — F41.9 ANXIETY: ICD-10-CM

## 2022-07-01 DIAGNOSIS — R07.9 CHEST PAIN, UNSPECIFIED TYPE: ICD-10-CM

## 2022-07-01 DIAGNOSIS — G20 PARKINSON'S DISEASE (HCC): ICD-10-CM

## 2022-07-01 DIAGNOSIS — D69.2 SENILE PURPURA (HCC): ICD-10-CM

## 2022-07-01 DIAGNOSIS — I50.9 HEART FAILURE, UNSPECIFIED HF CHRONICITY, UNSPECIFIED HEART FAILURE TYPE (HCC): ICD-10-CM

## 2022-07-01 PROCEDURE — 99214 OFFICE O/P EST MOD 30 MIN: CPT | Performed by: NURSE PRACTITIONER

## 2022-07-01 PROCEDURE — 1160F RVW MEDS BY RX/DR IN RCRD: CPT | Performed by: NURSE PRACTITIONER

## 2022-07-01 RX ORDER — FAMOTIDINE 20 MG/1
20 TABLET, FILM COATED ORAL 2 TIMES DAILY
Qty: 60 TABLET | Refills: 2 | Status: SHIPPED | OUTPATIENT
Start: 2022-07-01 | End: 2022-08-22

## 2022-07-01 NOTE — PROGRESS NOTES
INTERNAL MEDICINE FOLLOW-UP VISIT  [facility] [address] [place] [address]    NAME: Graham Henderson  AGE: 69 y o  SEX: female  : 1945     DATE: 2022     Assessment and Plan:   1  Murmur  - Echo complete w/ contrast if indicated; Future    2  Chest pain, unspecified type  History of abn stress cardiac cath never completed- trop was normal  ? Anxiety vs gerd vs other  Start pepcid and check echo as noted  - famotidine (PEPCID) 20 mg tablet; Take 1 tablet (20 mg total) by mouth 2 (two) times a day  Dispense: 60 tablet; Refill: 2    3  Senile purpura ([facility] [address] )    4  Heart failure, unspecified HF chronicity, unspecified heart failure type ([address] )  Check echo no overt signs of overload    5  Anxiety  Continue lexapro and wellbutrin    6  Parkinson's disease (HCC)/dementia  Following w/ neurology            No follow-ups on file  Chief Complaint:     Chief Complaint   Patient presents with    Follow-up     Was at ED for chest pain- feeling better  History of Present Illness:     Here to follow up recent ER visit  Pt is here w/ her significant other and daughter alexander  Pt was experiencing substernal cp and was taken to the ER  While in the ER there was an issue w/ another patient and she started to become upset and she started to feel the discomfort abain  She had an abn stress in cory never had the cath done  Poor historian b/c of parkinsons/ dementia    The following portions of the patient's history were reviewed and updated as appropriate: allergies, current medications, past family history, past medical history, past social history, past surgical history and problem list      Review of Systems:     Review of Systems   Constitutional: Negative for appetite change, chills, diaphoresis, fatigue, fever and unexpected weight change  HENT: Negative for postnasal drip and sneezing  Eyes: Negative for visual disturbance     Respiratory: Negative for chest tightness and shortness of breath  Cardiovascular: Negative for chest pain, palpitations and leg swelling  Gastrointestinal: Negative for abdominal pain and blood in stool  Endocrine: Negative for cold intolerance, heat intolerance, polydipsia, polyphagia and polyuria  Genitourinary: Negative for difficulty urinating, dysuria, frequency and urgency  Musculoskeletal: Negative for arthralgias and myalgias  Skin: Negative for rash and wound  Neurological: Negative for dizziness, weakness, light-headedness and headaches  Hematological: Negative for adenopathy  Psychiatric/Behavioral: Negative for confusion, dysphoric mood and sleep disturbance  The patient is not nervous/anxious           Past Medical History:     Past Medical History:   Diagnosis Date    Anxiety     BRCA1 negative 1980    BRCA2 negative 1980    Disease of thyroid gland     Generalized anxiety disorder 4/23/2015    Hepatic disease     Infectious viral hepatitis     Liver disease     Osteoporosis     Overweight (BMI 25 0-29 9) 2/12/2020    Parkinson's disease ([facility] [address] )     Patient refuses to disclose advance directive information 5/23/2018    Overview:  Yes, Patient instructed to provide copy of advance directive for provider to review and to be scanned into Electronic Medical Record  Overview:  Yes, Patient instructed to provide copy of advance directive for provider to review and to be scanned into Electronic Medical Record    Rosacea     Senile osteoporosis 5/27/2003    Shortness of breath         Current Medications:     Current Outpatient Medications:     acetaminophen (TYLENOL) 650 mg CR tablet, Take 650mg tid x 5 days then tid prn for pain, Disp: 30 tablet, Rfl: 0    Ascorbic Acid (vitamin C) 100 MG tablet, Take by mouth, Disp: , Rfl:     aspirin 81 mg chewable tablet, Chew 1 tablet (81 mg total) daily, Disp: , Rfl:     buPROPion (Wellbutrin XL) 150 mg 24 hr tablet, Take 1 tablet (150 mg total) by mouth every morning, Disp: 90 tablet, Rfl: 3    carbidopa-levodopa (SINEMET)  mg per tablet, Take 2 tablets by mouth 4 (four) times a day, Disp: 720 tablet, Rfl: 1    Cholecalciferol (VITAMIN D) 2000 units tablet, 1 daily, Disp: , Rfl:     Cranberry 1000 MG CAPS, Take 4,200 mg by mouth as needed , Disp: , Rfl:     escitalopram (LEXAPRO) 20 mg tablet, Take 20 mg by mouth daily  , Disp: , Rfl:     famotidine (PEPCID) 20 mg tablet, Take 1 tablet (20 mg total) by mouth 2 (two) times a day, Disp: 60 tablet, Rfl: 2    levothyroxine 50 mcg tablet, Take 1 tablet (50 mcg total) by mouth daily, Disp: 90 tablet, Rfl: 0    methenamine hippurate (HIPREX) 1 g tablet, Take 1 tablet (1 g total) by mouth 2 (two) times a day with meals, Disp: 60 tablet, Rfl: 5    Multiple Vitamin (MULTIVITAMINS PO), daily, Disp: , Rfl:     betamethasone dipropionate (DIPROSONE) 0 05 % cream, Apply topically 2 (two) times a day (Patient not taking: Reported on 7/1/2022), Disp: , Rfl:     clobetasol (TEMOVATE) 0 05 % external solution, , Disp: , Rfl:     escitalopram (LEXAPRO) 20 mg tablet, Take 1 tablet (20 mg total) by mouth daily (Patient not taking: Reported on 7/1/2022), Disp: 30 tablet, Rfl: 5    sulfamethoxazole-trimethoprim (BACTRIM DS) 800-160 mg per tablet, Take 1 tablet by mouth every 12 (twelve) hours, Disp: , Rfl:      Allergies: Allergies   Allergen Reactions    Statins Other (See Comments)     jaundice    Naproxen Hives    Simvastatin Other (See Comments)     Jaundice    Dust Mite Extract Other (See Comments)     Per pt states does not know the type of reaction    Pollen Extract Sneezing, Nasal Congestion, Cough and Itching        Physical Exam:     /86 (BP Location: Left arm, Patient Position: Sitting, Cuff Size: Standard)   Pulse 78   Resp 16   Ht 5' 3" (1 6 m)   Wt 58 2 kg (128 lb 6 4 oz)   BMI 22 75 kg/m²     Physical Exam  Constitutional:       Appearance: She is well-developed  HENT:      Head: Normocephalic and atraumatic     Eyes: Pupils: Pupils are equal, round, and reactive to light  Neck:      Thyroid: No thyromegaly  Cardiovascular:      Rate and Rhythm: Normal rate and regular rhythm  Heart sounds: No murmur heard  Pulmonary:      Effort: Pulmonary effort is normal       Breath sounds: Normal breath sounds  Abdominal:      General: Bowel sounds are normal       Palpations: Abdomen is soft  Musculoskeletal:         General: Normal range of motion  Cervical back: Normal range of motion and neck supple  Lymphadenopathy:      Cervical: No cervical adenopathy  Skin:     General: Skin is warm and dry  Neurological:      Mental Status: She is alert and oriented to person, place, and time  Data:     Laboratory Results: I have personally reviewed the pertinent laboratory results/reports   Radiology/Other Diagnostic Testing Results: I have personally reviewed pertinent reports        SAGE Fisher  [facility]

## 2022-07-20 ENCOUNTER — HOSPITAL ENCOUNTER (OUTPATIENT)
Dept: NON INVASIVE DIAGNOSTICS | Facility: CLINIC | Age: 77
Discharge: HOME | End: 2022-07-20
Payer: COMMERCIAL

## 2022-07-20 VITALS
HEIGHT: 63 IN | HEART RATE: 78 BPM | SYSTOLIC BLOOD PRESSURE: 120 MMHG | BODY MASS INDEX: 22.68 KG/M2 | WEIGHT: 128 LBS | DIASTOLIC BLOOD PRESSURE: 86 MMHG

## 2022-07-20 DIAGNOSIS — R01.1 MURMUR: ICD-10-CM

## 2022-07-20 LAB
AORTIC ROOT: 3.1 CM
APICAL FOUR CHAMBER EJECTION FRACTION: 61 %
AV REGURGITATION PRESSURE HALF TIME: 763 MS
E WAVE DECELERATION TIME: 251 MS
FRACTIONAL SHORTENING: 28 (ref 28–44)
INTERVENTRICULAR SEPTUM IN DIASTOLE (PARASTERNAL SHORT AXIS VIEW): 1 CM
INTERVENTRICULAR SEPTUM: 1 CM (ref 0.6–1.1)
LAAS-AP2: 16.5 CM2
LAAS-AP4: 18.2 CM2
LEFT ATRIUM SIZE: 3 CM
LEFT INTERNAL DIMENSION IN SYSTOLE: 2.6 CM (ref 2.1–4)
LEFT VENTRICULAR INTERNAL DIMENSION IN DIASTOLE: 3.6 CM (ref 3.5–6)
LEFT VENTRICULAR POSTERIOR WALL IN END DIASTOLE: 1 CM
LEFT VENTRICULAR STROKE VOLUME: 30 ML
LVSV (TEICH): 30 ML
MV E'TISSUE VEL-SEP: 7 CM/S
MV PEAK A VEL: 0.84 M/S
MV PEAK E VEL: 71 CM/S
MV STENOSIS PRESSURE HALF TIME: 73 MS
MV VALVE AREA P 1/2 METHOD: 3.01
RIGHT ATRIAL 2D VOLUME: 18 ML
RIGHT ATRIUM AREA SYSTOLE A4C: 11.1 CM2
RIGHT VENTRICLE ID DIMENSION: 2.8 CM
SL CV AV DECELERATION TIME RETROGRADE: 2631 MS
SL CV AV PEAK GRADIENT RETROGRADE: 83 MMHG
SL CV LEFT ATRIUM LENGTH A2C: 5.5 CM
SL CV LV EF: 60
SL CV PED ECHO LEFT VENTRICLE DIASTOLIC VOLUME (MOD BIPLANE) 2D: 54 ML
SL CV PED ECHO LEFT VENTRICLE SYSTOLIC VOLUME (MOD BIPLANE) 2D: 24 ML
TR MAX PG: 24 MMHG
TR PEAK VELOCITY: 2.4 M/S
TRICUSPID VALVE PEAK REGURGITATION VELOCITY: 2.44 M/S

## 2022-07-20 PROCEDURE — 93306 TTE W/DOPPLER COMPLETE: CPT | Performed by: INTERNAL MEDICINE

## 2022-07-20 PROCEDURE — 93306 TTE W/DOPPLER COMPLETE: CPT

## 2022-08-08 ENCOUNTER — APPOINTMENT (OUTPATIENT)
Dept: LAB | Facility: CLINIC | Age: 77
End: 2022-08-08
Payer: COMMERCIAL

## 2022-08-08 ENCOUNTER — OFFICE VISIT (OUTPATIENT)
Dept: INTERNAL MEDICINE CLINIC | Facility: CLINIC | Age: 77
End: 2022-08-08
Payer: COMMERCIAL

## 2022-08-08 VITALS
DIASTOLIC BLOOD PRESSURE: 80 MMHG | WEIGHT: 124.8 LBS | HEART RATE: 80 BPM | OXYGEN SATURATION: 96 % | HEIGHT: 63 IN | TEMPERATURE: 98.7 F | RESPIRATION RATE: 16 BRPM | SYSTOLIC BLOOD PRESSURE: 128 MMHG | BODY MASS INDEX: 22.11 KG/M2

## 2022-08-08 DIAGNOSIS — I38 MURMUR, DIASTOLIC: Primary | ICD-10-CM

## 2022-08-08 DIAGNOSIS — R30.0 BURNING WITH URINATION: ICD-10-CM

## 2022-08-08 DIAGNOSIS — R73.01 IMPAIRED FASTING GLUCOSE: ICD-10-CM

## 2022-08-08 DIAGNOSIS — R94.39 ABNORMAL CARDIOVASCULAR STRESS TEST: ICD-10-CM

## 2022-08-08 DIAGNOSIS — E78.2 MIXED HYPERLIPIDEMIA: ICD-10-CM

## 2022-08-08 LAB
ALBUMIN SERPL BCP-MCNC: 3.6 G/DL (ref 3.5–5)
ALP SERPL-CCNC: 72 U/L (ref 46–116)
ALT SERPL W P-5'-P-CCNC: 8 U/L (ref 12–78)
ANION GAP SERPL CALCULATED.3IONS-SCNC: 7 MMOL/L (ref 4–13)
AST SERPL W P-5'-P-CCNC: <5 U/L (ref 5–45)
BILIRUB SERPL-MCNC: 0.47 MG/DL (ref 0.2–1)
BUN SERPL-MCNC: 17 MG/DL (ref 5–25)
CALCIUM SERPL-MCNC: 9.2 MG/DL (ref 8.3–10.1)
CHLORIDE SERPL-SCNC: 105 MMOL/L (ref 96–108)
CO2 SERPL-SCNC: 25 MMOL/L (ref 21–32)
CREAT SERPL-MCNC: 0.82 MG/DL (ref 0.6–1.3)
ERYTHROCYTE [DISTWIDTH] IN BLOOD BY AUTOMATED COUNT: 13.8 % (ref 11.6–15.1)
EST. AVERAGE GLUCOSE BLD GHB EST-MCNC: 108 MG/DL
GFR SERPL CREATININE-BSD FRML MDRD: 69 ML/MIN/1.73SQ M
GLUCOSE SERPL-MCNC: 105 MG/DL (ref 65–140)
HBA1C MFR BLD: 5.4 %
HCT VFR BLD AUTO: 45.1 % (ref 34.8–46.1)
HGB BLD-MCNC: 14.7 G/DL (ref 11.5–15.4)
INR PPP: 1.01 (ref 0.84–1.19)
MCH RBC QN AUTO: 29.8 PG (ref 26.8–34.3)
MCHC RBC AUTO-ENTMCNC: 32.6 G/DL (ref 31.4–37.4)
MCV RBC AUTO: 91 FL (ref 82–98)
PLATELET # BLD AUTO: 225 THOUSANDS/UL (ref 149–390)
PMV BLD AUTO: 11.5 FL (ref 8.9–12.7)
POTASSIUM SERPL-SCNC: 4 MMOL/L (ref 3.5–5.3)
PROT SERPL-MCNC: 7 G/DL (ref 6.4–8.4)
PROTHROMBIN TIME: 13.5 SECONDS (ref 11.6–14.5)
RBC # BLD AUTO: 4.94 MILLION/UL (ref 3.81–5.12)
SODIUM SERPL-SCNC: 137 MMOL/L (ref 135–147)
WBC # BLD AUTO: 8.15 THOUSAND/UL (ref 4.31–10.16)

## 2022-08-08 PROCEDURE — 85610 PROTHROMBIN TIME: CPT

## 2022-08-08 PROCEDURE — 99213 OFFICE O/P EST LOW 20 MIN: CPT | Performed by: NURSE PRACTITIONER

## 2022-08-08 PROCEDURE — 36415 COLL VENOUS BLD VENIPUNCTURE: CPT

## 2022-08-08 PROCEDURE — 85027 COMPLETE CBC AUTOMATED: CPT

## 2022-08-08 PROCEDURE — 80053 COMPREHEN METABOLIC PANEL: CPT

## 2022-08-08 PROCEDURE — 83036 HEMOGLOBIN GLYCOSYLATED A1C: CPT

## 2022-08-08 PROCEDURE — 1160F RVW MEDS BY RX/DR IN RCRD: CPT | Performed by: NURSE PRACTITIONER

## 2022-08-08 RX ORDER — MULTIVIT-MIN/IRON/FOLIC ACID/K 18-600-40
CAPSULE ORAL DAILY
COMMUNITY

## 2022-08-08 NOTE — PROGRESS NOTES
INTERNAL MEDICINE FOLLOW-UP VISIT  [facility] - [facility] [address] [place] [address]    NAME: Mellissa Patient  AGE: 69 y o  SEX: female  : 1945     DATE: 2022     Assessment and Plan:   1  Murmur, diastolic   echocardiogram reviewed with patient and her significant other shows diastolic dysfunction, discussed that this just means the heart is not relaxing when it should this is just something that we monitor also she has a mitral valve prolapse that is causing some backflow of blood this is only trace and something that we will just continue to monitor on a yearly basis          No follow-ups on file  Chief Complaint:     Chief Complaint   Patient presents with    Follow-up     testing      History of Present Illness:     Here to follow up echo here w/ spouse    The following portions of the patient's history were reviewed and updated as appropriate: allergies, current medications, past family history, past medical history, past social history, past surgical history and problem list      Review of Systems:     Review of Systems   Constitutional: Negative for appetite change, chills, diaphoresis, fatigue, fever and unexpected weight change  HENT: Negative for postnasal drip and sneezing  Eyes: Negative for visual disturbance  Respiratory: Negative for chest tightness and shortness of breath  Cardiovascular: Negative for chest pain, palpitations and leg swelling  Gastrointestinal: Negative for abdominal pain and blood in stool  Endocrine: Negative for cold intolerance, heat intolerance, polydipsia, polyphagia and polyuria  Genitourinary: Negative for difficulty urinating, dysuria, frequency and urgency  Musculoskeletal: Negative for arthralgias and myalgias  Skin: Negative for rash and wound  Neurological: Negative for dizziness, weakness, light-headedness and headaches  Hematological: Negative for adenopathy     Psychiatric/Behavioral: Negative for confusion, dysphoric mood and sleep disturbance  The patient is not nervous/anxious           Past Medical History:     Past Medical History:   Diagnosis Date    Anxiety     BRCA1 negative 1980    BRCA2 negative 1980    Disease of thyroid gland     Generalized anxiety disorder 4/23/2015    Hepatic disease     Infectious viral hepatitis     Liver disease     Osteoporosis     Overweight (BMI 25 0-29 9) 2/12/2020    Parkinson's disease ([facility] [address] )     Patient refuses to disclose advance directive information 5/23/2018    Overview:  Yes, Patient instructed to provide copy of advance directive for provider to review and to be scanned into Electronic Medical Record  Overview:  Yes, Patient instructed to provide copy of advance directive for provider to review and to be scanned into Electronic Medical Record    Rosacea     Senile osteoporosis 5/27/2003    Shortness of breath         Current Medications:     Current Outpatient Medications:     acetaminophen (TYLENOL) 650 mg CR tablet, Take 650mg tid x 5 days then tid prn for pain, Disp: 30 tablet, Rfl: 0    Ascorbic Acid (vitamin C) 100 MG tablet, Take by mouth, Disp: , Rfl:     aspirin 81 mg chewable tablet, Chew 1 tablet (81 mg total) daily, Disp: , Rfl:     buPROPion (Wellbutrin XL) 150 mg 24 hr tablet, Take 1 tablet (150 mg total) by mouth every morning, Disp: 90 tablet, Rfl: 3    carbidopa-levodopa (SINEMET)  mg per tablet, Take 2 tablets by mouth 4 (four) times a day, Disp: 720 tablet, Rfl: 1    Cholecalciferol (Vitamin D) 50 MCG (2000 UT) CAPS, Take by mouth daily, Disp: , Rfl:     Cranberry 1000 MG CAPS, Take 4,200 mg by mouth as needed , Disp: , Rfl:     escitalopram (LEXAPRO) 20 mg tablet, Take 20 mg by mouth daily  , Disp: , Rfl:     famotidine (PEPCID) 20 mg tablet, Take 1 tablet (20 mg total) by mouth 2 (two) times a day, Disp: 60 tablet, Rfl: 2    methenamine hippurate (HIPREX) 1 g tablet, Take 1 tablet (1 g total) by mouth 2 (two) times a day with meals, Disp: 60 tablet, Rfl: 5    levothyroxine 50 mcg tablet, Take 1 tablet (50 mcg total) by mouth daily, Disp: 90 tablet, Rfl: 0     Allergies: Allergies   Allergen Reactions    Statins Other (See Comments)     jaundice    Naproxen Hives    Simvastatin Other (See Comments)     Jaundice    Dust Mite Extract Other (See Comments)     Per pt states does not know the type of reaction    Pollen Extract Sneezing, Nasal Congestion, Cough and Itching        Physical Exam:     /80 (BP Location: Left arm, Patient Position: Sitting, Cuff Size: Standard)   Pulse 80   Temp 98 7 °F (37 1 °C) (Tympanic)   Resp 16   Ht 5' 3" (1 6 m)   Wt 56 6 kg (124 lb 12 8 oz)   SpO2 96%   BMI 22 11 kg/m²     Physical Exam  Constitutional:       Appearance: She is well-developed  HENT:      Head: Normocephalic and atraumatic  Eyes:      Pupils: Pupils are equal, round, and reactive to light  Neck:      Thyroid: No thyromegaly  Cardiovascular:      Rate and Rhythm: Normal rate and regular rhythm  Heart sounds: Murmur heard  Pulmonary:      Effort: Pulmonary effort is normal       Breath sounds: Normal breath sounds  Abdominal:      General: Bowel sounds are normal       Palpations: Abdomen is soft  Musculoskeletal:         General: Normal range of motion  Cervical back: Normal range of motion and neck supple  Lymphadenopathy:      Cervical: No cervical adenopathy  Skin:     General: Skin is warm and dry  Neurological:      Mental Status: She is alert and oriented to person, place, and time  Data:     Laboratory Results: I have personally reviewed the pertinent laboratory results/reports   Radiology/Other Diagnostic Testing Results: I have personally reviewed pertinent reports        SAGE Mahmood  [facility]

## 2022-08-09 ENCOUNTER — OFFICE VISIT (OUTPATIENT)
Dept: NEUROLOGY | Facility: CLINIC | Age: 77
End: 2022-08-09
Payer: COMMERCIAL

## 2022-08-09 VITALS — DIASTOLIC BLOOD PRESSURE: 64 MMHG | TEMPERATURE: 97.7 F | SYSTOLIC BLOOD PRESSURE: 110 MMHG | HEART RATE: 78 BPM

## 2022-08-09 DIAGNOSIS — G20 PARKINSON'S DISEASE (HCC): ICD-10-CM

## 2022-08-09 DIAGNOSIS — F02.80 DEMENTIA ASSOCIATED WITH OTHER UNDERLYING DISEASE WITHOUT BEHAVIORAL DISTURBANCE (HCC): Primary | ICD-10-CM

## 2022-08-09 PROCEDURE — 99215 OFFICE O/P EST HI 40 MIN: CPT | Performed by: PHYSICIAN ASSISTANT

## 2022-08-09 RX ORDER — MEMANTINE HYDROCHLORIDE 5 MG-10 MG
KIT ORAL
Qty: 1 TABLET | Refills: 0 | Status: SHIPPED | OUTPATIENT
Start: 2022-08-09

## 2022-08-09 RX ORDER — MEMANTINE HYDROCHLORIDE 10 MG/1
TABLET ORAL
Qty: 60 TABLET | Refills: 3 | Status: SHIPPED | OUTPATIENT
Start: 2022-08-09

## 2022-08-09 NOTE — PROGRESS NOTES
Patient ID: Lele Henry is a 69 y o  female  Assessment/Plan:    Parkinson's disease [facility])  Patient with Parkinson's disease complicated by hallucinations and delusions  At this point no clear on or off with the medication  Exam is stable  Will have her remain on current dosing of Sinemet  Hallucinations and delusions are still occurring since stopping Neupro, however perhaps slightly better  She is still clearly having worsening cognitive deficits with confusion  She scored an 11/30 on MoCA in the office today  She had side effects with Exelon patch and rivastigmine in the past and we discussed a trial of Namenda at this time  Discussed a trial of Namenda to see if this may help with cognition and perhaps even dampen the hallucinations  Potential side effects include diarrhea, nausea, fatigue and dizziness  Will start with a titration pack for the first month and then remain on 10mg bid if tolerated  If hallucinations worsen or start to become more of an issue for her then could consider a trial of Nuplazid or Seroquel  She was encouraged to remain active  She works with PT and OT  Subjective:    Princess Patiño is a right-handed woman with levodopa-responsive Parkinson's disease  To review, symptom onset in 2011 with left foot tremor, later complicated by non-motor wearing-off, slight dyskinesias, FoG  In Fall of 2011 she noted some balance trouble and later developed micrographia  She was started on Azilect 1mg with little notable improvement  Neupro patch later added  Sinemet was added later and has been titrated up over time  At her last visit she had some worsening of her gait and freezing episodes with no benefit on higher rivastigmine dosing  Sinemet was slightly increased  At her last visit she was overall stable other than some worsening fatigue and hallucinations  Neupro patch was stopped  No other changes made       INTERVAL HISTORY:  Confusion improved off Neupro however she appears more depressed without it  Hallucinations and delusions are still present but not bothersome to her   Memory remains an ongoing issues, continues to get worse with episodes of confusion at times  She feels that she sleeps well after she falls asleep, she does struggle with falling asleep at times   She does seem to have more confusion in the evening   She has some episode when she may have urinary incontinence at night   She resides at [facility]   She will often get assistance for dressing and showering  No issues with swallowing   She has a book with exercises and she works with PT and OT  No clear on or off with the medication  She had a telemedicine visit with Dr Jovon Whalen at [facility] 4/22/22 to discuss MRI guided US  It was not felt to be a good option for her given primary use for tremor control and tremors not particularly bothersome to patient  Current medications and timing:  - Sinemet 25/100; 2tabs qid (8am, 12:30, 4:30pm, 8:30pm)      Prior medication trials:  - Neupro Patch 2mg evening - stopped due to hallucinations   - Azilect 0 5mg in the evening (stopped by PCP, gait worsened without it)  - Amantadine - side effects   - Exelon patch 9 5mg - itchy blister  - rivastigmine pill- GI side effects          I personally reviewed and updated the ROS  Total time spent today was 50 minutes  Greater than 50% of total time was spent with the patient and / or family counseling and / or coordinating plan of care  Objective:    Blood pressure 110/64, pulse 78, temperature 97 7 °F (36 5 °C), not currently breastfeeding  Physical Exam  Eyes:      Extraocular Movements: Extraocular movements intact  Pupils: Pupils are equal, round, and reactive to light  Neurological:      Mental Status: She is alert  Deep Tendon Reflexes: Strength normal    Psychiatric:         Speech: Speech normal          Neurological Exam  Mental Status  Alert   Oriented only to person and situation  Speech is normal  Language is fluent with no aphasia  Attention and concentration are normal   MoCA 11/30 - 8/9/22    MoCA 25/30 - 6/15/21       Cranial Nerves  CN III, IV, VI: Extraocular movements intact bilaterally  Pupils equal round and reactive to light bilaterally  CN VII: Full and symmetric facial movement  CN VIII: Hearing is normal   CN XI: Shoulder shrug strength is normal   CN XII: Tongue midline without atrophy or fasciculations  Motor   Increased muscle tone  Strength is 5/5 throughout all four extremities  Sensory  Light touch is normal in upper and lower extremities  Coordination  Right: Finger-to-nose normal  Rapid alternating movement normal Left: Finger-to-nose normal  Rapid alternating movement abnormality:  See MDS UPDRS III  Gait  Casual gait: Unable to rise from chair without using arms  Reduced stride  En bloc turn  Albino Ryder     UPDRS motor:                              Time since last dose:   6/15/21     Speech   0 0   Facial Expression    1   Rigidity - Neck   0     Rigidity - Upper Extremity (Right)   0 0   Rigidity - Upper Extremity (Left)    1 1   Rigidity - Lower Extremity (Right)   1 0   Rigidity - Lower Extremity (Left)    0 0   Finger Taps (Right)    0 0   Finger Taps (Left)    0 0   Hand Movement (Right)   0 0   Hand Movement (Left)    1 0   Pronation/Supination (Right)   1 0   Pronation/Supination (Left)    1 0   Toe Tapping (Right)  0 0   Toe Tapping (Left)  1 1   Leg Agility (Right)   0 0   Leg Agility (Left)    0 0   Arising from Chair    1 1   Gait    2 2   Freezing of Gait  0 0   Postural Stability         Posture  1 1   Global spontaneity of movement  1 1   Postural Tremor (Right)  0 0   Postural Tremor (Left)  0 0   Kinetic Tremor (Right)   0 0   Kinetic Tremor (Left)   0 0   Rest tremor amplitude RUE  0 0   Rest tremor amplitude LUE  0 0   Rest tremor amplitude RLE  0 0   Reset tremor amplitude LLE  0 0   Lip/Jaw Tremor    0   Consistency of tremor  0 0 Motor Exam Total:              ROS:    Review of Systems   Constitutional: Positive for fatigue (Constant)  Negative for appetite change and fever  HENT: Negative  Negative for hearing loss, tinnitus, trouble swallowing and voice change  Eyes: Negative  Negative for photophobia and pain  Respiratory: Negative  Negative for shortness of breath  Cardiovascular: Negative  Negative for palpitations  Gastrointestinal: Negative  Negative for nausea and vomiting  Endocrine: Negative  Negative for cold intolerance  Genitourinary: Negative  Negative for dysuria, frequency and urgency  Musculoskeletal: Positive for gait problem (Occasional freezing but has become more frequent as stated by the ) and myalgias (Thighs and back)  Negative for neck pain  Balance issues occasionally     Skin: Negative  Negative for rash  Allergic/Immunologic: Negative  Neurological: Positive for dizziness (minimal) and tremors (Occasional foot tremor)  Negative for seizures, syncope, facial asymmetry, speech difficulty, weakness, light-headedness, numbness and headaches  Tingling in back of legs     Hematological: Bruises/bleeds easily (Bruise due to blood thinner)  Psychiatric/Behavioral: Positive for confusion (Sometimes) and hallucinations (Patients  and son states she believes she hears someone else but is not that person)  Negative for sleep disturbance  All other systems reviewed and are negative

## 2022-08-09 NOTE — PATIENT INSTRUCTIONS
Patient with Parkinson's disease complicated by hallucinations and delusions  At this point no clear on or off with the medication  Will have her remain on current dosing of Sinemet  Hallucinations and delusions are still occurring since stopping Neupro, however perhaps slightly better  She is still clearly having worsening cognitive deficits with confusion  She scored an 11/30 on MoCA in the office today  She had side effects with Exelon patch and rivastigmine in the past and we discussed a trial of Namenda at this time  Discussed a trial of Namenda to see if this may help with cognition and perhaps even dampen the hallucinations  Potential side effects include diarrhea, nausea, fatigue and dizziness  Will start with a titration pack for the first month and then remain on 10mg bid if tolerated  If hallucinations worsen or start to become more of an issues for her then could consider a trial of Nuplazid or Seroquel  She was encouraged to remain active  She works with PT and OT 
Detail Level: Simple

## 2022-08-10 NOTE — ASSESSMENT & PLAN NOTE
Patient with Parkinson's disease complicated by hallucinations and delusions  At this point no clear on or off with the medication  Exam is stable  Will have her remain on current dosing of Sinemet  Hallucinations and delusions are still occurring since stopping Neupro, however perhaps slightly better  She is still clearly having worsening cognitive deficits with confusion  She scored an 11/30 on MoCA in the office today  She had side effects with Exelon patch and rivastigmine in the past and we discussed a trial of Namenda at this time  Discussed a trial of Namenda to see if this may help with cognition and perhaps even dampen the hallucinations  Potential side effects include diarrhea, nausea, fatigue and dizziness  Will start with a titration pack for the first month and then remain on 10mg bid if tolerated  If hallucinations worsen or start to become more of an issue for her then could consider a trial of Nuplazid or Seroquel  She was encouraged to remain active  She works with PT and OT

## 2022-08-22 DIAGNOSIS — R07.9 CHEST PAIN, UNSPECIFIED TYPE: ICD-10-CM

## 2022-08-22 RX ORDER — FAMOTIDINE 20 MG/1
TABLET, FILM COATED ORAL
Qty: 56 TABLET | Refills: 4 | Status: SHIPPED | OUTPATIENT
Start: 2022-08-22 | End: 2022-09-21

## 2022-08-30 ENCOUNTER — TELEPHONE (OUTPATIENT)
Dept: INTERNAL MEDICINE CLINIC | Facility: CLINIC | Age: 77
End: 2022-08-30

## 2022-08-30 NOTE — TELEPHONE ENCOUNTER
Joy Dodd, Nurse at [facility] called to report that Carol Miller is COVID Positive as of today  They are not sure if you would like to prescribed the Plaxovoid for her  She is not congested and has no fever  breast and formula feeding

## 2022-08-31 ENCOUNTER — TELEPHONE (OUTPATIENT)
Dept: INTERNAL MEDICINE CLINIC | Facility: CLINIC | Age: 77
End: 2022-08-31

## 2022-08-31 NOTE — TELEPHONE ENCOUNTER
Eveleen Severs (Clarita's daughter); is calling Agnieszka Rosario; head nurse at [facility]- so she can call to set up a virtual appt with Dr Lynda Oliva positive as of yesterday

## 2022-09-01 ENCOUNTER — TELEMEDICINE (OUTPATIENT)
Dept: INTERNAL MEDICINE CLINIC | Facility: CLINIC | Age: 77
End: 2022-09-01
Payer: COMMERCIAL

## 2022-09-01 DIAGNOSIS — U07.1 COVID-19: Primary | ICD-10-CM

## 2022-09-01 PROCEDURE — 1160F RVW MEDS BY RX/DR IN RCRD: CPT | Performed by: INTERNAL MEDICINE

## 2022-09-01 PROCEDURE — 99213 OFFICE O/P EST LOW 20 MIN: CPT | Performed by: INTERNAL MEDICINE

## 2022-09-01 RX ORDER — BENZONATATE 200 MG/1
200 CAPSULE ORAL 3 TIMES DAILY PRN
Qty: 20 CAPSULE | Refills: 0 | Status: SHIPPED | OUTPATIENT
Start: 2022-09-01

## 2022-09-01 RX ORDER — DEXTROMETHORPHAN HYDROBROMIDE AND PROMETHAZINE HYDROCHLORIDE 15; 6.25 MG/5ML; MG/5ML
5 SOLUTION ORAL 4 TIMES DAILY PRN
Qty: 120 ML | Refills: 0 | Status: SHIPPED | OUTPATIENT
Start: 2022-09-01

## 2022-09-01 NOTE — PROGRESS NOTES
COVID-19 Outpatient Progress Note    Assessment/Plan:    Problem List Items Addressed This Visit    None     Visit Diagnoses     COVID-19    -  Primary    Relevant Medications    Promethazine-DM (PHENERGAN-DM) 6 25-15 mg/5 mL oral syrup    benzonatate (TESSALON) 200 MG capsule         Disposition:     I have spent 12 minutes directly with the patient  Greater than 50% of this time was spent in counseling/coordination of care regarding: risks and benefits of treatment options, importance of treatment compliance, risk factor reductions and impressions  Outside tx window, tx cough and monitor sx  Encounter provider: Chloe Phelan MD     Provider located at: [address] [place] [id number]     Recent Visits  Date Type Provider Dept   08/31/22 Telephone [address] [place] [address]   08/30/22 Telephone Chloe Phelan MD [address] [place] [address] recent visits within past 7 days and meeting all other requirements  Today's Visits  Date Type Provider Dept   09/01/22 Telemedicine Chloe Phelan MD [address] [place] [address] today's visits and meeting all other requirements  Future Appointments  No visits were found meeting these conditions  Showing future appointments within next 150 days and meeting all other requirements     This virtual check-in was done via telephone and she agrees to proceed  Patient agrees to participate in a virtual check in via telephone or video visit instead of presenting to the office to address urgent/immediate medical needs  Patient is aware this is a billable service  She acknowledged consent and understanding of privacy and security of the video platform  The patient has agreed to participate and understands they can discontinue the visit at any time  After connecting through Telephone, the patient was identified by name and date of birth   Shamir Clayton was informed that this was a telemedicine visit and that the exam was being conducted confidentially over secure lines  My office door was closed  No one else was in the room  Ana Stoner acknowledged consent and understanding of privacy and security of the telemedicine visit  I informed the patient that I have reviewed her record in Epic and presented the opportunity for her to ask any questions regarding the visit today  The patient agreed to participate  It was my intent to perform this visit via video technology but the patient was not able to do a video connection so the visit was completed via audio telephone only  Verification of patient location:  Patient is located in the following state in which I hold an active license: [place]    Subjective:   Ana Stoner is a 69 y o  female who is concerned about COVID-19  Patient's symptoms include fatigue, malaise, nasal congestion, rhinorrhea, sore throat, cough and headache  Patient denies fever, chills, anosmia, loss of taste, shortness of breath, chest tightness, abdominal pain, nausea, vomiting, diarrhea and myalgias       - Date of symptom onset: 8/26/2022      COVID-19 vaccination status: Fully vaccinated with booster    Exposure:   Contact with a person who is under investigation (PUI) for or who is positive for COVID-19 within the last 14 days?: No    Hospitalized recently for fever and/or lower respiratory symptoms?: No      Currently a healthcare worker that is involved in direct patient care?: No      Works in a special setting where the risk of COVID-19 transmission may be high? (this may include long-term care, correctional and shelter facilities; homeless shelters; assisted-living facilities and group homes ): No      Resident in a special setting where the risk of COVID-19 transmission may be high? (this may include long-term care, correctional and shelter facilities; homeless shelters; assisted-living facilities and group homes ): No      Lab Results Component Value Date    SARSCOV2 Negative 10/02/2021     Past Medical History:   Diagnosis Date    Anxiety     BRCA1 negative 1980    BRCA2 negative 1980    Disease of thyroid gland     Generalized anxiety disorder 4/23/2015    Hepatic disease     Infectious viral hepatitis     Liver disease     Osteoporosis     Overweight (BMI 25 0-29 9) 2/12/2020    Parkinson's disease ([facility] [address] )     Patient refuses to disclose advance directive information 5/23/2018    Overview:  Yes, Patient instructed to provide copy of advance directive for provider to review and to be scanned into Electronic Medical Record  Overview:  Yes, Patient instructed to provide copy of advance directive for provider to review and to be scanned into Electronic Medical Record    Rosacea     Senile osteoporosis 5/27/2003    Shortness of breath      Past Surgical History:   Procedure Laterality Date    FOOT SURGERY Bilateral     HYSTERECTOMY      AL ESOPHAGOGASTRODUODENOSCOPY TRANSORAL DIAGNOSTIC N/A 9/5/2018    Procedure: EGD AND COLONOSCOPY;  Surgeon: Yohan Nick MD;  Location: [facility];   Service: Gastroenterology    URETHRA SURGERY      laparoscopic urethral suspension for stress incontinence     Current Outpatient Medications   Medication Sig Dispense Refill    benzonatate (TESSALON) 200 MG capsule Take 1 capsule (200 mg total) by mouth 3 (three) times a day as needed for cough 20 capsule 0    Promethazine-DM (PHENERGAN-DM) 6 25-15 mg/5 mL oral syrup Take 5 mL by mouth 4 (four) times a day as needed for cough 120 mL 0    acetaminophen (TYLENOL) 650 mg CR tablet Take 650mg tid x 5 days then tid prn for pain 30 tablet 0    Ascorbic Acid (vitamin C) 100 MG tablet Take by mouth      aspirin 81 mg chewable tablet Chew 1 tablet (81 mg total) daily      buPROPion (Wellbutrin XL) 150 mg 24 hr tablet Take 1 tablet (150 mg total) by mouth every morning 90 tablet 3    carbidopa-levodopa (SINEMET)  mg per tablet Take 2 tablets by mouth 4 (four) times a day 720 tablet 3    Cholecalciferol (Vitamin D) 50 MCG (2000 UT) CAPS Take by mouth daily      Cranberry 1000 MG CAPS Take 4,200 mg by mouth as needed       escitalopram (LEXAPRO) 20 mg tablet Take 20 mg by mouth daily        famotidine (PEPCID) 20 mg tablet 1 TAB ORALLY TWICE DAILY DX: GERD 56 tablet 4    levothyroxine 50 mcg tablet Take 1 tablet (50 mcg total) by mouth daily 90 tablet 0    memantine (NAMENDA TITRATION PACK) Follow package directions  1 tablet 0    memantine (Namenda) 10 mg tablet Take 1tab bid 60 tablet 3    methenamine hippurate (HIPREX) 1 g tablet Take 1 tablet (1 g total) by mouth 2 (two) times a day with meals 60 tablet 5    Multiple Vitamin (DAILY CHIDI PO) Take by mouth       No current facility-administered medications for this visit  Allergies   Allergen Reactions    Statins Other (See Comments)     jaundice    Naproxen Hives    Simvastatin Other (See Comments)     Jaundice    Dust Mite Extract Other (See Comments)     Per pt states does not know the type of reaction    Pollen Extract Sneezing, Nasal Congestion, Cough and Itching       Review of Systems   Constitutional: Positive for fatigue  Negative for chills and fever  HENT: Positive for congestion, rhinorrhea and sore throat  Respiratory: Positive for cough  Negative for chest tightness and shortness of breath  Gastrointestinal: Negative for abdominal pain, diarrhea, nausea and vomiting  Musculoskeletal: Negative for myalgias  Neurological: Positive for headaches  Objective: There were no vitals filed for this visit      Physical Exam

## 2022-09-16 ENCOUNTER — HOSPITAL ENCOUNTER (OUTPATIENT)
Dept: BONE DENSITY | Facility: CLINIC | Age: 77
Discharge: HOME | End: 2022-09-16
Payer: COMMERCIAL

## 2022-09-16 DIAGNOSIS — Z78.0 MENOPAUSE: ICD-10-CM

## 2022-09-16 PROCEDURE — 77080 DXA BONE DENSITY AXIAL: CPT

## 2022-09-20 ENCOUNTER — RA CDI HCC (OUTPATIENT)
Dept: OTHER | Facility: HOSPITAL | Age: 77
End: 2022-09-20

## 2022-09-20 NOTE — PROGRESS NOTES
[facility]  coding opportunities       Chart reviewed, no opportunity found:   [address]        Patients Insurance     Medicare Insurance: [employer]

## 2022-09-21 ENCOUNTER — OFFICE VISIT (OUTPATIENT)
Dept: INTERNAL MEDICINE CLINIC | Facility: CLINIC | Age: 77
End: 2022-09-21

## 2022-09-21 VITALS
BODY MASS INDEX: 20.52 KG/M2 | HEART RATE: 78 BPM | SYSTOLIC BLOOD PRESSURE: 100 MMHG | TEMPERATURE: 98.8 F | DIASTOLIC BLOOD PRESSURE: 64 MMHG | OXYGEN SATURATION: 98 % | HEIGHT: 63 IN | WEIGHT: 115.8 LBS

## 2022-09-21 DIAGNOSIS — K82.8 GALLBLADDER SLUDGE: ICD-10-CM

## 2022-09-21 DIAGNOSIS — R07.89 ATYPICAL CHEST PAIN: ICD-10-CM

## 2022-09-21 DIAGNOSIS — K44.9 HIATAL HERNIA: ICD-10-CM

## 2022-09-21 DIAGNOSIS — R94.39 ABNORMAL STRESS TEST: ICD-10-CM

## 2022-09-21 DIAGNOSIS — M81.0 AGE-RELATED OSTEOPOROSIS WITHOUT CURRENT PATHOLOGICAL FRACTURE: ICD-10-CM

## 2022-09-21 DIAGNOSIS — K21.9 GASTROESOPHAGEAL REFLUX DISEASE WITHOUT ESOPHAGITIS: Primary | ICD-10-CM

## 2022-09-21 DIAGNOSIS — F02.80 DEMENTIA ASSOCIATED WITH OTHER UNDERLYING DISEASE WITHOUT BEHAVIORAL DISTURBANCE (HCC): ICD-10-CM

## 2022-09-21 RX ORDER — PANTOPRAZOLE SODIUM 40 MG/1
40 TABLET, DELAYED RELEASE ORAL
Qty: 30 TABLET | Refills: 5 | Status: SHIPPED | OUTPATIENT
Start: 2022-09-21

## 2022-09-21 NOTE — PATIENT INSTRUCTIONS
ER workups, labs, nuclear stress test and bone density reviewed    Atypical chest pain in the setting of abnormal stress test, multiple cardiac risk factors and 2 workups with negative troponins  My suspicion is that the chest pain is noncardiac and likely to be related to hiatal hernia and reflux  Less likely related to biliary colic as there was some gallbladder sludge noted though liver enzymes were normal   At this point we had a lengthy discussion regarding pathophysiology of hiatal hernia and GERD  Will start pantoprazole 40 mg daily  Best if taken 30 minutes before breakfast   Discontinue famotidine  Monitor symptoms over the next 1 month  I have reached out to Cardiology in order to arrange follow-up at that time to consider cardiac catheterization if chest pains recur  Monitor for postprandial abdominal discomfort which could be indicative of biliary colic, order ultrasound right upper quadrant and consider HIDA scan should that occur  Osteoporosis-patient is on bisphosphonate holiday  In light of large hiatal hernia and likely reflux symptomatology she is a poor candidate to go back on oral bisphosphonates, therefore will initiate Prolia every 6 months

## 2022-09-21 NOTE — PROGRESS NOTES
Assessment/Plan:    Diagnoses and all orders for this visit:    Gastroesophageal reflux disease without esophagitis  -     pantoprazole (PROTONIX) 40 mg tablet; Take 1 tablet (40 mg total) by mouth daily before breakfast    Hiatal hernia  -     pantoprazole (PROTONIX) 40 mg tablet; Take 1 tablet (40 mg total) by mouth daily before breakfast    Dementia associated with other underlying disease without behavioral disturbance (HCC)    Atypical chest pain    Abnormal stress test    Age-related osteoporosis without current pathological fracture  -     denosumab (PROLIA) subcutaneous injection 60 mg    Gallbladder sludge            Patient Instructions   ER workups, labs, nuclear stress test and bone density reviewed    Atypical chest pain in the setting of abnormal stress test, multiple cardiac risk factors and 2 workups with negative troponins  My suspicion is that the chest pain is noncardiac and likely to be related to hiatal hernia and reflux  Less likely related to biliary colic as there was some gallbladder sludge noted though liver enzymes were normal   At this point we had a lengthy discussion regarding pathophysiology of hiatal hernia and GERD  Will start pantoprazole 40 mg daily  Best if taken 30 minutes before breakfast   Discontinue famotidine  Monitor symptoms over the next 1 month  I have reached out to Cardiology in order to arrange follow-up at that time to consider cardiac catheterization if chest pains recur  Monitor for postprandial abdominal discomfort which could be indicative of biliary colic, order ultrasound right upper quadrant and consider HIDA scan should that occur  Osteoporosis-patient is on bisphosphonate holiday  In light of large hiatal hernia and likely reflux symptomatology she is a poor candidate to go back on oral bisphosphonates, therefore will initiate Prolia every 6 months        Subjective:      Patient ID: Soy Clements is a 69 y o  female    Patient presents for ER follow-up accompanied by Jing Blandon and son Johny Mejia    She was seen in the emergency department on September 6th  She is a bit of a difficult historian today  From ER record review she was brought from [place] [id number] due to epigastric pain  She had unremarkable lab workup including troponins x3 as well as comprehensive metabolic panel and CBC  Chest x-ray was notable only for hiatal hernia and CT abdomen and pelvis was notable for moderate-sized hiatal hernia with slightly enlarged gallbladder with questionable gallbladder sludge  Patient recalls being at rest during the episode of chest pain but unaware how long the chest pain lasted  Notably, patient has been having chest pains for over a year  She had stress test with subtle reversible defect in January  She was evaluated by Cardiology in February and declined cardiac catheterization at that time  Current Outpatient Medications:   •  acetaminophen (TYLENOL) 650 mg CR tablet, Take 650mg tid x 5 days then tid prn for pain, Disp: 30 tablet, Rfl: 0  •  Ascorbic Acid (vitamin C) 100 MG tablet, Take by mouth, Disp: , Rfl:   •  aspirin 81 mg chewable tablet, Chew 1 tablet (81 mg total) daily, Disp: , Rfl:   •  benzonatate (TESSALON) 200 MG capsule, Take 1 capsule (200 mg total) by mouth 3 (three) times a day as needed for cough, Disp: 20 capsule, Rfl: 0  •  buPROPion (Wellbutrin XL) 150 mg 24 hr tablet, Take 1 tablet (150 mg total) by mouth every morning, Disp: 90 tablet, Rfl: 3  •  carbidopa-levodopa (SINEMET)  mg per tablet, Take 2 tablets by mouth 4 (four) times a day, Disp: 720 tablet, Rfl: 3  •  Cholecalciferol (Vitamin D) 50 MCG (2000 UT) CAPS, Take by mouth daily, Disp: , Rfl:   •  Cranberry 1000 MG CAPS, Take 4,200 mg by mouth as needed , Disp: , Rfl:   •  escitalopram (LEXAPRO) 20 mg tablet, Take 20 mg by mouth daily  , Disp: , Rfl:   •  memantine (NAMENDA TITRATION PACK), Follow package directions  , Disp: 1 tablet, Rfl: 0  •  memantine (Namenda) 10 mg tablet, Take 1tab bid, Disp: 60 tablet, Rfl: 3  •  methenamine hippurate (HIPREX) 1 g tablet, Take 1 tablet (1 g total) by mouth 2 (two) times a day with meals, Disp: 60 tablet, Rfl: 5  •  Multiple Vitamin (DAILY CHIDI PO), Take by mouth, Disp: , Rfl:   •  pantoprazole (PROTONIX) 40 mg tablet, Take 1 tablet (40 mg total) by mouth daily before breakfast, Disp: 30 tablet, Rfl: 5  •  Promethazine-DM (PHENERGAN-DM) 6 25-15 mg/5 mL oral syrup, Take 5 mL by mouth 4 (four) times a day as needed for cough, Disp: 120 mL, Rfl: 0  •  levothyroxine 50 mcg tablet, Take 1 tablet (50 mcg total) by mouth daily, Disp: 90 tablet, Rfl: 0    Current Facility-Administered Medications:   •  denosumab (PROLIA) subcutaneous injection 60 mg, 60 mg, Subcutaneous, Q6 Months, Mamadou Rodriguez MD    No results found for this or any previous visit (from the past 1008 hour(s))  The following portions of the patient's history were reviewed and updated as appropriate: allergies, current medications, past family history, past medical history, past social history, past surgical history and problem list      Review of Systems   Constitutional: Negative for appetite change, chills, diaphoresis, fatigue, fever and unexpected weight change  HENT: Negative for congestion, hearing loss and rhinorrhea  Eyes: Negative for visual disturbance  Respiratory: Negative for cough, chest tightness, shortness of breath and wheezing  Cardiovascular: Positive for chest pain  Negative for palpitations and leg swelling  Gastrointestinal: Negative for abdominal pain and blood in stool  Endocrine: Negative for cold intolerance, heat intolerance, polydipsia and polyuria  Genitourinary: Negative for difficulty urinating, dysuria, frequency and urgency  Musculoskeletal: Negative for arthralgias and myalgias  Skin: Negative for rash  Neurological: Negative for dizziness, weakness, light-headedness and headaches     Hematological: Does not bruise/bleed easily  Psychiatric/Behavioral: Negative for dysphoric mood and sleep disturbance  Objective:      Vitals:    09/21/22 1428   BP: 100/64   Pulse: 78   Temp: 98 8 °F (37 1 °C)   SpO2: 98%          Physical Exam  Constitutional:       Appearance: She is well-developed  HENT:      Head: Normocephalic and atraumatic  Nose: Nose normal    Eyes:      General: No scleral icterus  Conjunctiva/sclera: Conjunctivae normal       Pupils: Pupils are equal, round, and reactive to light  Neck:      Thyroid: No thyromegaly  Vascular: No JVD  Trachea: No tracheal deviation  Cardiovascular:      Rate and Rhythm: Normal rate and regular rhythm  Heart sounds: No murmur heard  No friction rub  No gallop  Pulmonary:      Effort: Pulmonary effort is normal  No respiratory distress  Breath sounds: Normal breath sounds  No wheezing or rales  Abdominal:      General: Bowel sounds are normal  There is no distension  Palpations: Abdomen is soft  There is no mass  Tenderness: There is no abdominal tenderness  There is no guarding or rebound  Musculoskeletal:         General: No tenderness  Cervical back: Normal range of motion and neck supple  Lymphadenopathy:      Cervical: No cervical adenopathy  Skin:     General: Skin is warm and dry  Findings: No erythema or rash  Neurological:      Mental Status: She is alert and oriented to person, place, and time  Cranial Nerves: No cranial nerve deficit  Psychiatric:         Behavior: Behavior normal          Thought Content:  Thought content normal          Judgment: Judgment normal

## 2022-09-22 ENCOUNTER — TELEPHONE (OUTPATIENT)
Dept: INTERNAL MEDICINE CLINIC | Facility: CLINIC | Age: 77
End: 2022-09-22

## 2022-09-22 NOTE — TELEPHONE ENCOUNTER
PROLIA-New/Info>[employer]  Pt verified insurance- [employer]  Does not anticipate any insurance changes at this time

## 2022-10-12 ENCOUNTER — TELEPHONE (OUTPATIENT)
Dept: INTERNAL MEDICINE CLINIC | Facility: CLINIC | Age: 77
End: 2022-10-12

## 2022-10-19 ENCOUNTER — TELEPHONE (OUTPATIENT)
Dept: INTERNAL MEDICINE CLINIC | Facility: CLINIC | Age: 77
End: 2022-10-19

## 2022-10-19 NOTE — TELEPHONE ENCOUNTER
PROLIA-Received SOB/LVM Schedule appt    10/19/22-Donna approval ref #[id number] 10/1/22 - 12/31/99- OOP 20%    LVM asking pt to return call to schedule Prolia administration    Last dose-NEW

## 2022-10-27 ENCOUNTER — TELEPHONE (OUTPATIENT)
Dept: INTERNAL MEDICINE CLINIC | Facility: CLINIC | Age: 77
End: 2022-10-27

## 2022-10-27 NOTE — TELEPHONE ENCOUNTER
PROLIA-Received SOB/Appt  scheduled    Spoke w/patient's daughter Murtaza Umana    Last dose-NEW    *Appt   Scheduled-11/1/22  *Med ordered-3713-Del 10/28/22  *Order>PCP  *Log and [employer] updated

## 2022-10-28 NOTE — TELEPHONE ENCOUNTER
Patient's daughter DANE is calling back needs to cancel and reschedule PROLIA shot for the middle of  November

## 2022-11-01 ENCOUNTER — TELEPHONE (OUTPATIENT)
Dept: INTERNAL MEDICINE CLINIC | Facility: CLINIC | Age: 77
End: 2022-11-01

## 2022-11-01 ENCOUNTER — CLINICAL SUPPORT (OUTPATIENT)
Dept: INTERNAL MEDICINE CLINIC | Facility: CLINIC | Age: 77
End: 2022-11-01

## 2022-11-01 DIAGNOSIS — M85.89 OSTEOPENIA OF MULTIPLE SITES: ICD-10-CM

## 2022-11-18 DIAGNOSIS — F02.80 DEMENTIA ASSOCIATED WITH OTHER UNDERLYING DISEASE WITHOUT BEHAVIORAL DISTURBANCE (HCC): ICD-10-CM

## 2022-11-22 RX ORDER — MEMANTINE HYDROCHLORIDE 10 MG/1
TABLET ORAL
Qty: 56 TABLET | Refills: 4 | Status: SHIPPED | OUTPATIENT
Start: 2022-11-22

## 2022-12-19 NOTE — TELEPHONE ENCOUNTER
Patient had colonoscopy 9/6/18,  would like to know if okay to take Miralax and Colace together  Please call [phone]  Thank you  n/a

## 2023-01-04 ENCOUNTER — OFFICE VISIT (OUTPATIENT)
Dept: NEUROLOGY | Facility: CLINIC | Age: 78
End: 2023-01-04

## 2023-01-04 VITALS
SYSTOLIC BLOOD PRESSURE: 99 MMHG | DIASTOLIC BLOOD PRESSURE: 60 MMHG | HEART RATE: 81 BPM | RESPIRATION RATE: 16 BRPM | TEMPERATURE: 98.8 F

## 2023-01-04 DIAGNOSIS — G20 PARKINSON'S DISEASE (HCC): Primary | ICD-10-CM

## 2023-01-04 NOTE — ASSESSMENT & PLAN NOTE
Patient with Parkinson's disease complicated by hallucinations and delusions  She is overall doing well with her current dose of Sinemet  No clear off with the medication however she does have improvement with the Sinemet in regards to her mobility and mood  Her exam is overall stable  She continues to have issues with hallucinations and delusions  No clear benefit with the namenda  Confusion also remains on issue  At this time the hallucinations are not threatening to her  Did discuss the options for medication management for the hallucinations including Seroquel or Nuplazid  They do both have a black box warning of sudden cardiac death in elderly with dementia  At this time however will hold off on starting any new medication but may reconsider if things worsen in the future  For now will not make any changes to her Sinemet dose  Could consider increasing the dose in the future if her mobility continues to worsen  Would need to be cautious however given this may also worsen her hallucinations/delusions  She was encouraged to remain active, she does work with PT once a week  Core strength is not as good, would recommend that she continue to work on exercises for this as per the therapist      She does have some issues with constipation  Provided a pamphlet with OTC options  Would also encourage her to increase fluids

## 2023-01-04 NOTE — PROGRESS NOTES
Patient ID: Chanelle Cisneros is a 69 y o  female  Assessment/Plan:    Parkinson's disease [facility])  Patient with Parkinson's disease complicated by hallucinations and delusions  She is overall doing well with her current dose of Sinemet  No clear off with the medication however she does have improvement with the Sinemet in regards to her mobility and mood  Her exam is overall stable  She continues to have issues with hallucinations and delusions  No clear benefit with the namenda  Confusion also remains on issue  At this time the hallucinations are not threatening to her  Did discuss the options for medication management for the hallucinations including Seroquel or Nuplazid  They do both have a black box warning of sudden cardiac death in elderly with dementia  At this time however will hold off on starting any new medication but may reconsider if things worsen in the future  For now will not make any changes to her Sinemet dose  Could consider increasing the dose in the future if her mobility continues to worsen  Would need to be cautious however given this may also worsen her hallucinations/delusions  She was encouraged to remain active, she does work with PT once a week  Core strength is not as good, would recommend that she continue to work on exercises for this as per the therapist      She does have some issues with constipation  Provided a pamphlet with OTC options  Would also encourage her to increase fluids  Subjective:    Carol Miller is a right-handed woman with levodopa-responsive Parkinson's disease  To review, symptom onset in 2011 with left foot tremor, later complicated by non-motor wearing-off, slight dyskinesias, FoG  In Fall of 2011 she noted some balance trouble and later developed micrographia  She was started on Azilect 1mg with little notable improvement  Neupro patch later added  Sinemet was added later and has been titrated up over time   At her last visit she had some worsening of her gait and freezing episodes with no benefit on higher rivastigmine dosing  Sinemet was slightly increased  She had a telemedicine visit with Dr Bret Bear at [facility] 4/22/22 to discuss MRI guided US  It was not felt to be a good option for her given primary use for tremor control and tremors not particularly bothersome to patient  At her last visit she was overall stable mobility wise  She was having more issues with hallucinations, delusions and confusion  Scored 11/30 on [facility]  Started on namenda  INTERVAL HISTORY:  Dementia and concentration remain an issue   No clear benefit with the addition of the Namenda other than perhaps for the first few months   She has no clear off with the medication however she feels that the medication helps calm her   She tends to take small steps but can take larger steps when prompted   She does work with PT  She will walk around the room when no one is walking, she is often told to stay in her chair    helps with dressing and showering   She sleeps, takes multiple naps during the day   She will see or hear her grandkids at times, she has episodes of confusion     Current medications and timing:  - Sinemet 25/100; 2tabs qid (8am, 12:30, 4:30pm, 8:30pm)  - Namenda 10mg bid       Prior medication trials:  - Neupro Patch 2mg evening - stopped due to hallucinations, confusion and fatigue   - Azilect 0 5mg in the evening (stopped by PCP, gait worsened without it)  - Amantadine - side effects   - Exelon patch 9 5mg - itchy blister  - rivastigmine pill- GI side effects      I personally reviewed and updated the ROS  Objective:    Blood pressure 99/60, pulse 81, temperature 98 8 °F (37 1 °C), temperature source Temporal, resp  rate 16, not currently breastfeeding  Physical Exam  Eyes:      Extraocular Movements: Extraocular movements intact  Pupils: Pupils are equal, round, and reactive to light  Neurological:      Mental Status: She is alert  Motor: Motor strength is normal    Psychiatric:         Speech: Speech normal          Neurological Exam  Mental Status  Alert  Oriented only to person and situation  Speech is normal  Language is fluent with no aphasia  Attention and concentration are normal   MoCA 10/30 - 1/4/23 - required frequent redirecting     MoCA 11/30 - 8/9/22  MoCA 25/30 - 6/15/21       Cranial Nerves  CN III, IV, VI: Extraocular movements intact bilaterally  Pupils equal round and reactive to light bilaterally  CN VII: Full and symmetric facial movement  CN VIII: Hearing is normal   CN XI: Shoulder shrug strength is normal   CN XII: Tongue midline without atrophy or fasciculations  Motor   Increased muscle tone  Strength is 5/5 throughout all four extremities  Sensory  Light touch is normal in upper and lower extremities  Coordination  Right: Finger-to-nose normal  Rapid alternating movement normal Left: Finger-to-nose normal  Rapid alternating movement abnormality:  See MDS UPDRS III  Gait   Unable to rise from chair without using arms  Not formally ambulated         UPDRS motor:                              Time since last dose:   1/4/23 8/9/22   Speech   0 0   Facial Expression  1 1   Rigidity - Neck   0     Rigidity - Upper Extremity (Right)   0 0   Rigidity - Upper Extremity (Left)    1 1   Rigidity - Lower Extremity (Right)   0 0   Rigidity - Lower Extremity (Left)    1 0   Finger Taps (Right)    0 0   Finger Taps (Left)    1 0   Hand Movement (Right)   0 0   Hand Movement (Left)    1 0   Pronation/Supination (Right)   1 0   Pronation/Supination (Left)    1 0   Toe Tapping (Right)  0 0   Toe Tapping (Left)  1 1   Leg Agility (Right)   0 0   Leg Agility (Left)    0 0   Arising from Chair    1 1   Gait     2   Freezing of Gait  0   Postural Stability         Posture   1   Global spontaneity of movement  1 1   Postural Tremor (Right)  0 0   Postural Tremor (Left)  0 0   Kinetic Tremor (Right)   0 0   Kinetic Tremor (Left)   0 0   Rest tremor amplitude RUE  0 0   Rest tremor amplitude LUE  0 0   Rest tremor amplitude RLE  0 0   Reset tremor amplitude LLE  0 0   Lip/Jaw Tremor    0   Consistency of tremor  0 0   Motor Exam Total:            ROS:    Review of Systems   Constitutional: Negative  Negative for appetite change and fever  HENT: Negative  Negative for hearing loss, tinnitus, trouble swallowing and voice change  Eyes: Negative  Negative for photophobia, pain and visual disturbance  Respiratory: Negative  Negative for shortness of breath  Cardiovascular: Negative  Negative for palpitations  Gastrointestinal: Negative  Negative for nausea and vomiting  Endocrine: Negative  Negative for cold intolerance  Genitourinary: Negative  Negative for dysuria, frequency and urgency  Musculoskeletal: Negative  Negative for gait problem, myalgias and neck pain  Skin: Negative  Negative for rash  Allergic/Immunologic: Negative  Neurological: Negative  Negative for dizziness, tremors, seizures, syncope, facial asymmetry, speech difficulty, weakness, light-headedness, numbness and headaches  Hematological: Negative  Does not bruise/bleed easily  Psychiatric/Behavioral: Negative  Negative for confusion, hallucinations and sleep disturbance

## 2023-01-04 NOTE — PATIENT INSTRUCTIONS
Patient with Parkinson's disease complicated by hallucinations and delusions  She is overall doing well with her current dose of Sinemet  No clear off with the medication however she does have improvement with the Sinemet in regards to her mobility and mood  Her exam is overall stable  She continues to have issues with hallucinations and delusions  No clear benefit with the namenda  Confusion also remain on issue  At this time the hallucinations are not threatening to her  Did discuss the options for medication management for the hallucinations including Seroquel or Nuplazid  They do both have a black box warning of sudden cardiac death in elderly with dementia  At this time however will hold off on starting any new medication but may reconsider if things worsen in the future  For now will not make any changes to her Sinemet dose  She was encouraged to remain active, she does work with PT once a week  Core strength is not as good, would recommend that she continue to work on exercises for this as per the therapist      She does have some issues with constipation  Provided a pamphlet with OTC options  Would also encourage her to increase fluids

## 2023-01-08 ENCOUNTER — TELEPHONE (OUTPATIENT)
Dept: OTHER | Facility: OTHER | Age: 78
End: 2023-01-08

## 2023-01-08 NOTE — TELEPHONE ENCOUNTER
PT 's daughter called in requesting a call back regarding scheduling an appointment with Dr Guillermina Hoff the 16 or 21 regarding her bowels  I could not find any openings with of those days  Please call PT's daughter Neil Fuchsnola back

## 2023-01-11 ENCOUNTER — RA CDI HCC (OUTPATIENT)
Dept: OTHER | Facility: HOSPITAL | Age: 78
End: 2023-01-11

## 2023-01-12 NOTE — PROGRESS NOTES
[facility]  coding opportunities       Chart reviewed, no opportunity found:   [address]        Patients Insurance     Medicare Insurance: [employer]

## 2023-01-17 ENCOUNTER — OFFICE VISIT (OUTPATIENT)
Dept: INTERNAL MEDICINE CLINIC | Facility: CLINIC | Age: 78
End: 2023-01-17

## 2023-01-17 VITALS
RESPIRATION RATE: 16 BRPM | DIASTOLIC BLOOD PRESSURE: 60 MMHG | SYSTOLIC BLOOD PRESSURE: 104 MMHG | HEART RATE: 84 BPM | TEMPERATURE: 98.5 F | OXYGEN SATURATION: 98 %

## 2023-01-17 DIAGNOSIS — N18.31 STAGE 3A CHRONIC KIDNEY DISEASE (HCC): ICD-10-CM

## 2023-01-17 DIAGNOSIS — E78.2 MIXED HYPERLIPIDEMIA: ICD-10-CM

## 2023-01-17 DIAGNOSIS — K59.09 OTHER CONSTIPATION: Primary | ICD-10-CM

## 2023-01-17 DIAGNOSIS — F03.90 DEMENTIA WITHOUT BEHAVIORAL DISTURBANCE (HCC): ICD-10-CM

## 2023-01-17 DIAGNOSIS — E03.9 ACQUIRED HYPOTHYROIDISM: ICD-10-CM

## 2023-01-17 DIAGNOSIS — F33.41 RECURRENT MAJOR DEPRESSIVE DISORDER, IN PARTIAL REMISSION (HCC): ICD-10-CM

## 2023-01-17 RX ORDER — DOCUSATE SODIUM 100 MG/1
100 CAPSULE, LIQUID FILLED ORAL 2 TIMES DAILY
Qty: 60 CAPSULE | Refills: 1 | Status: CANCELLED | OUTPATIENT
Start: 2023-01-17 | End: 2023-02-16

## 2023-01-17 RX ORDER — POLYETHYLENE GLYCOL 3350 17 G/17G
17 POWDER, FOR SOLUTION ORAL DAILY PRN
Qty: 510 G | Refills: 0 | Status: SHIPPED | OUTPATIENT
Start: 2023-01-17 | End: 2023-02-16

## 2023-01-17 NOTE — ASSESSMENT & PLAN NOTE
Lab Results   Component Value Date    EGFR 69 08/08/2022    EGFR 58 01/14/2022    EGFR 75 10/02/2021    CREATININE 0 82 08/08/2022    CREATININE 0 95 01/14/2022    CREATININE 0 78 10/02/2021     GFR and Cr are at baseline  Recommend periodic blood test for monitoring of BUN and Creat  Avoid Non Steroidal Antiinflammatory such as ibuprofen, aleve etc   Potassium, HCO3 and calcium are wnl and will require periodic blood test   Bone and Mineral disease monitoring as appropriate    All patient with GFR less than 30( CKD 4 or 5 or 6) advised to follow with nephrologist

## 2023-01-17 NOTE — PROGRESS NOTES
Name: Paulino Mcallister      : 1945      MRN: [id number]  Encounter Provider: Deanna Ireland MD  Encounter Date: 2023   Encounter department: [facility]     1  Other constipation  Assessment & Plan:  Concern for hard large BMs difficult to flush  Report 2 BMs/week  Will provide with Miralax to use as needed  Orders:  -     polyethylene glycol (MIRALAX) 17 g packet; Take 17 g by mouth daily as needed (for constipation)    2  Mixed hyperlipidemia  -     Lipid Panel with Direct LDL reflex; Future    3  Stage 3a chronic kidney disease [facility])  Assessment & Plan:  Lab Results   Component Value Date    EGFR 69 2022    EGFR 58 2022    EGFR 75 10/02/2021    CREATININE 0 82 2022    CREATININE 0 95 2022    CREATININE 0 78 10/02/2021     GFR and Cr are at baseline  Recommend periodic blood test for monitoring of BUN and Creat  Avoid Non Steroidal Antiinflammatory such as ibuprofen, aleve etc   Potassium, HCO3 and calcium are wnl and will require periodic blood test   Bone and Mineral disease monitoring as appropriate  All patient with GFR less than 30( CKD 4 or 5 or 6) advised to follow with nephrologist     Orders:  -     CBC and differential; Future  -     Comprehensive metabolic panel; Future  -     Lipid Panel with Direct LDL reflex; Future    4  Recurrent major depressive disorder, in partial remission ([facility] [address] )  Assessment & Plan:  Stable mood  Continue Lexapro      5  Acquired hypothyroidism  Assessment & Plan:  TSH of 1 5 in 2022  Stable thyroid function  Continue dose of levothyroxine  Check TSH  Orders:  -     Comprehensive metabolic panel; Future  -     TSH, 3rd generation with Free T4 reflex; Future    6  Dementia without behavioral disturbance [facility])  Assessment & Plan:  Patient with dementia without behavioral disturbances    Reviewed note from neurologist   Return family agreeable to holding off on pharmacologic intervention at this time, given black box warning in patients with dementia  Subjective      Patient is seen for routine follow-up  Family is concerned about her bowel movements which have been difficult to flush down the toilet  She has been on Protonix daily and she feels the medication is working  She is eating oK, sleeping and voiding ok  Reviewed the neurology team note  Constipation: Reports having 2 BM/week and most times the stool is unable to flush down the toilet  Will give Miralax 17 gm daily as needed for constipation  Chest pain: acostaiily did get in touch with cardiology and wants to hold off on definitive coronary evaluation    GERD: On protonix which appears to helping  Will continue same  Parkinson's Dementia: Still having hallucination  Not bizzarre  Mostly consistent per son  Dues to concern for fall, she is mostly on the wheelchair  However, in the room she moves about without limitation  She has not had any documented falls, Takes walk while with family  Hypothyroidism: Stable on current dose of medication  Will continue same  Repeat TSH with next labs    CKD 3: Stable  BUN / Cr at baseline  Electrolytes are at baseline  Avoid NSAIDs or medications that will reduce the GFR  Concern for pulmonary atelactasis: RLL auscultation with reduced air entry, concerning for atelectasis due to prolonged sitting and poor lung excursion  Discussed benefit of breathing exercises to reduce risk of pneumonia  Will provide incentive spirometer  Review of Systems   Constitutional: Negative for chills and fever  HENT: Negative for ear pain and sore throat  Eyes: Negative for pain and visual disturbance  Respiratory: Negative for cough and shortness of breath  Cardiovascular: Negative for chest pain and palpitations  Gastrointestinal: Negative for abdominal pain and vomiting  Genitourinary: Negative for dysuria and hematuria     Musculoskeletal: Negative for arthralgias and back pain  Skin: Negative for color change and rash  Neurological: Negative for seizures and syncope  All other systems reviewed and are negative  Current Outpatient Medications on File Prior to Visit   Medication Sig   • acetaminophen (TYLENOL) 650 mg CR tablet Take 650mg tid x 5 days then tid prn for pain   • Ascorbic Acid (vitamin C) 100 MG tablet Take by mouth   • aspirin 81 mg chewable tablet Chew 1 tablet (81 mg total) daily   • benzonatate (TESSALON) 200 MG capsule Take 1 capsule (200 mg total) by mouth 3 (three) times a day as needed for cough   • buPROPion (Wellbutrin XL) 150 mg 24 hr tablet Take 1 tablet (150 mg total) by mouth every morning   • carbidopa-levodopa (SINEMET)  mg per tablet Take 2 tablets by mouth 4 (four) times a day   • Cholecalciferol (Vitamin D) 50 MCG (2000 UT) CAPS Take by mouth daily   • Cranberry 1000 MG CAPS Take 4,200 mg by mouth as needed   • escitalopram (LEXAPRO) 20 mg tablet Take 20 mg by mouth daily     • memantine (NAMENDA) 10 mg tablet 1 TAB ORALLY TWICE DAILY DX: DEMENTIA   • methenamine hippurate (HIPREX) 1 g tablet Take 1 tablet (1 g total) by mouth 2 (two) times a day with meals   • Multiple Vitamin (DAILY CHIDI PO) Take by mouth   • pantoprazole (PROTONIX) 40 mg tablet Take 1 tablet (40 mg total) by mouth daily before breakfast   • Promethazine-DM (PHENERGAN-DM) 6 25-15 mg/5 mL oral syrup Take 5 mL by mouth 4 (four) times a day as needed for cough   • levothyroxine 50 mcg tablet Take 1 tablet (50 mcg total) by mouth daily   • [DISCONTINUED] rotigotine (Neupro) 2 MG/24HR Place 1 patch on the skin daily       Objective     /60 (BP Location: Left arm, Patient Position: Sitting, Cuff Size: Standard)   Pulse 84   Temp 98 5 °F (36 9 °C) (Tympanic)   Resp 16   SpO2 98%     Physical Exam  Vitals reviewed  Constitutional:       General: She is not in acute distress  Appearance: Normal appearance  She is not toxic-appearing     HENT: Head: Normocephalic and atraumatic  Mouth/Throat:      Mouth: Mucous membranes are moist    Eyes:      Pupils: Pupils are equal, round, and reactive to light  Cardiovascular:      Rate and Rhythm: Normal rate and regular rhythm  Pulses: Normal pulses  Heart sounds: Normal heart sounds  Pulmonary:      Effort: Pulmonary effort is normal  No respiratory distress  Breath sounds: No stridor  Comments: Reduced air entry in the RLL  Abdominal:      General: Bowel sounds are normal  There is no distension  Palpations: Abdomen is soft  There is no mass  Tenderness: There is no abdominal tenderness  Musculoskeletal:         General: No swelling or tenderness  Normal range of motion  Cervical back: Normal range of motion and neck supple  Skin:     General: Skin is warm  Coloration: Skin is not jaundiced or pale  Neurological:      Mental Status: She is alert  Mental status is at baseline     Psychiatric:         Mood and Affect: Mood normal          Behavior: Behavior normal

## 2023-01-17 NOTE — ASSESSMENT & PLAN NOTE
Concern for hard large BMs difficult to flush  Report 2 BMs/week  Will provide with Miralax to use as needed

## 2023-01-17 NOTE — ASSESSMENT & PLAN NOTE
Patient with dementia without behavioral disturbances  Reviewed note from neurologist   Return family agreeable to holding off on pharmacologic intervention at this time, given black box warning in patients with dementia

## 2023-01-18 ENCOUNTER — TELEPHONE (OUTPATIENT)
Dept: INTERNAL MEDICINE CLINIC | Facility: CLINIC | Age: 78
End: 2023-01-18

## 2023-01-18 ENCOUNTER — TELEPHONE (OUTPATIENT)
Dept: PULMONOLOGY | Facility: CLINIC | Age: 78
End: 2023-01-18

## 2023-01-18 NOTE — TELEPHONE ENCOUNTER
----- Message from Atif Rosenbaum MD sent at 1/17/2023  4:35 PM EST -----  Regarding: incentive spirometer   Transmitted incentives from a trigger for patient but it does not come up under parachute or in DME that I can order

## 2023-02-28 ENCOUNTER — TELEPHONE (OUTPATIENT)
Dept: INTERNAL MEDICINE CLINIC | Facility: CLINIC | Age: 78
End: 2023-02-28

## 2023-02-28 NOTE — TELEPHONE ENCOUNTER
LVM for Clarita and pt's daughter Vivian Oh, explaining pt's   Last prolia inj 11/1/22, not due again until 5/2/23

## 2023-02-28 NOTE — TELEPHONE ENCOUNTER
PT   HAS  AN  APPT  WITH  [facility]   3/22  WANTS  TO KNOW  IF  SHE  CAN  GET  THE  PROLIA   SHOT   THE   SAME   DAY   HER  DAUGHTER   SAID  SHE  IS  DUE  HER  SHOT  3/20    AND  ITS HARD  TO  GET   HER  TO  HER  DR  VISITS

## 2023-03-01 ENCOUNTER — TELEPHONE (OUTPATIENT)
Dept: INTERNAL MEDICINE CLINIC | Facility: CLINIC | Age: 78
End: 2023-03-01

## 2023-03-08 ENCOUNTER — APPOINTMENT (OUTPATIENT)
Dept: LAB | Facility: CLINIC | Age: 78
End: 2023-03-08

## 2023-03-08 DIAGNOSIS — E03.9 ACQUIRED HYPOTHYROIDISM: ICD-10-CM

## 2023-03-08 DIAGNOSIS — E78.2 MIXED HYPERLIPIDEMIA: ICD-10-CM

## 2023-03-08 DIAGNOSIS — N18.31 STAGE 3A CHRONIC KIDNEY DISEASE (HCC): ICD-10-CM

## 2023-03-08 LAB
ALBUMIN SERPL BCP-MCNC: 3.3 G/DL (ref 3.5–5)
ALP SERPL-CCNC: 68 U/L (ref 46–116)
ALT SERPL W P-5'-P-CCNC: 11 U/L (ref 12–78)
ANION GAP SERPL CALCULATED.3IONS-SCNC: 5 MMOL/L (ref 4–13)
AST SERPL W P-5'-P-CCNC: 8 U/L (ref 5–45)
BASOPHILS # BLD AUTO: 0.05 THOUSANDS/ÂΜL (ref 0–0.1)
BASOPHILS NFR BLD AUTO: 1 % (ref 0–1)
BILIRUB SERPL-MCNC: 0.26 MG/DL (ref 0.2–1)
BUN SERPL-MCNC: 24 MG/DL (ref 5–25)
CALCIUM ALBUM COR SERPL-MCNC: 9.4 MG/DL (ref 8.3–10.1)
CALCIUM SERPL-MCNC: 8.8 MG/DL (ref 8.3–10.1)
CHLORIDE SERPL-SCNC: 109 MMOL/L (ref 96–108)
CHOLEST SERPL-MCNC: 189 MG/DL
CO2 SERPL-SCNC: 25 MMOL/L (ref 21–32)
CREAT SERPL-MCNC: 0.92 MG/DL (ref 0.6–1.3)
EOSINOPHIL # BLD AUTO: 0.41 THOUSAND/ÂΜL (ref 0–0.61)
EOSINOPHIL NFR BLD AUTO: 6 % (ref 0–6)
ERYTHROCYTE [DISTWIDTH] IN BLOOD BY AUTOMATED COUNT: 13.8 % (ref 11.6–15.1)
GFR SERPL CREATININE-BSD FRML MDRD: 60 ML/MIN/1.73SQ M
GLUCOSE P FAST SERPL-MCNC: 84 MG/DL (ref 65–99)
HCT VFR BLD AUTO: 43.2 % (ref 34.8–46.1)
HDLC SERPL-MCNC: 60 MG/DL
HGB BLD-MCNC: 14.1 G/DL (ref 11.5–15.4)
IMM GRANULOCYTES # BLD AUTO: 0.03 THOUSAND/UL (ref 0–0.2)
IMM GRANULOCYTES NFR BLD AUTO: 0 % (ref 0–2)
LDLC SERPL CALC-MCNC: 97 MG/DL (ref 0–100)
LYMPHOCYTES # BLD AUTO: 1.71 THOUSANDS/ÂΜL (ref 0.6–4.47)
LYMPHOCYTES NFR BLD AUTO: 23 % (ref 14–44)
MCH RBC QN AUTO: 30.5 PG (ref 26.8–34.3)
MCHC RBC AUTO-ENTMCNC: 32.6 G/DL (ref 31.4–37.4)
MCV RBC AUTO: 93 FL (ref 82–98)
MONOCYTES # BLD AUTO: 0.53 THOUSAND/ÂΜL (ref 0.17–1.22)
MONOCYTES NFR BLD AUTO: 7 % (ref 4–12)
NEUTROPHILS # BLD AUTO: 4.76 THOUSANDS/ÂΜL (ref 1.85–7.62)
NEUTS SEG NFR BLD AUTO: 63 % (ref 43–75)
NRBC BLD AUTO-RTO: 0 /100 WBCS
PLATELET # BLD AUTO: 230 THOUSANDS/UL (ref 149–390)
PMV BLD AUTO: 11.6 FL (ref 8.9–12.7)
POTASSIUM SERPL-SCNC: 4.3 MMOL/L (ref 3.5–5.3)
PROT SERPL-MCNC: 6.4 G/DL (ref 6.4–8.4)
RBC # BLD AUTO: 4.63 MILLION/UL (ref 3.81–5.12)
SODIUM SERPL-SCNC: 139 MMOL/L (ref 135–147)
TRIGL SERPL-MCNC: 158 MG/DL
TSH SERPL DL<=0.05 MIU/L-ACNC: 1.85 UIU/ML (ref 0.45–4.5)
WBC # BLD AUTO: 7.49 THOUSAND/UL (ref 4.31–10.16)

## 2023-03-21 ENCOUNTER — RA CDI HCC (OUTPATIENT)
Dept: OTHER | Facility: HOSPITAL | Age: 78
End: 2023-03-21

## 2023-03-21 NOTE — PROGRESS NOTES
[facility]  coding opportunities       Chart reviewed, no opportunity found:   [address]        Patients Insurance     Medicare Insurance: [employer]

## 2023-03-22 ENCOUNTER — OFFICE VISIT (OUTPATIENT)
Dept: INTERNAL MEDICINE CLINIC | Facility: CLINIC | Age: 78
End: 2023-03-22

## 2023-03-22 VITALS
DIASTOLIC BLOOD PRESSURE: 78 MMHG | HEART RATE: 76 BPM | SYSTOLIC BLOOD PRESSURE: 110 MMHG | RESPIRATION RATE: 16 BRPM | HEIGHT: 63 IN | BODY MASS INDEX: 20.51 KG/M2

## 2023-03-22 DIAGNOSIS — R44.0 AUDITORY HALLUCINATION: ICD-10-CM

## 2023-03-22 DIAGNOSIS — N39.0 FREQUENT UTI: ICD-10-CM

## 2023-03-22 DIAGNOSIS — F03.90 DEMENTIA WITHOUT BEHAVIORAL DISTURBANCE (HCC): ICD-10-CM

## 2023-03-22 DIAGNOSIS — K21.9 GASTROESOPHAGEAL REFLUX DISEASE WITHOUT ESOPHAGITIS: ICD-10-CM

## 2023-03-22 DIAGNOSIS — N39.46 MIXED STRESS AND URGE URINARY INCONTINENCE: ICD-10-CM

## 2023-03-22 DIAGNOSIS — M85.89 OSTEOPENIA OF MULTIPLE SITES: ICD-10-CM

## 2023-03-22 DIAGNOSIS — E78.2 MIXED HYPERLIPIDEMIA: ICD-10-CM

## 2023-03-22 DIAGNOSIS — F33.41 RECURRENT MAJOR DEPRESSIVE DISORDER, IN PARTIAL REMISSION (HCC): ICD-10-CM

## 2023-03-22 DIAGNOSIS — G20 PARKINSON'S DISEASE (HCC): Primary | ICD-10-CM

## 2023-03-22 PROBLEM — N18.31 STAGE 3A CHRONIC KIDNEY DISEASE (HCC): Status: RESOLVED | Noted: 2022-05-05 | Resolved: 2023-03-22

## 2023-03-22 NOTE — PATIENT INSTRUCTIONS
Lab data reviewed in detail and compared to prior    Clinically stable on present regimen    Follow-up in 3 months, sooner as needed

## 2023-03-22 NOTE — PROGRESS NOTES
Assessment/Plan:    Diagnoses and all orders for this visit:    Parkinson's disease ([facility] [address] )    Dementia without behavioral disturbance ([facility] )    Gastroesophageal reflux disease without esophagitis    Osteopenia of multiple sites    Frequent UTI    Mixed stress and urge urinary incontinence    Recurrent major depressive disorder, in partial remission ([facility] [address] )    Mixed hyperlipidemia    Auditory hallucination            Patient Instructions   Lab data reviewed in detail and compared to prior    Clinically stable on present regimen    Follow-up in 3 months, sooner as needed      Subjective:      Patient ID: Chela Daugherty is a 69 y o  female    F/u MMP and review labs, accompanied by Gale Murphy and Logan  Feeling well, no acute complaints  PD-taking rx as directed, f/b Dr Megha Haines  Having PT/OT paid out of pocket biw  THN/HPL-taking rx as directed, but no home bp's  Hypothyroid-taking rx as directed  Dementia-progressive, no aggressive behaviors, still w/ auditory hallucinations that aren't terribly troubling  Depression stable on present regimen  GERD-stable w/ pantoprazole  Osteoporosis-taking prolia biy          Current Outpatient Medications:   •  acetaminophen (TYLENOL) 650 mg CR tablet, Take 650mg tid x 5 days then tid prn for pain, Disp: 30 tablet, Rfl: 0  •  Ascorbic Acid (vitamin C) 100 MG tablet, Take by mouth, Disp: , Rfl:   •  aspirin 81 mg chewable tablet, Chew 1 tablet (81 mg total) daily, Disp: , Rfl:   •  buPROPion (Wellbutrin XL) 150 mg 24 hr tablet, Take 1 tablet (150 mg total) by mouth every morning, Disp: 90 tablet, Rfl: 3  •  carbidopa-levodopa (SINEMET)  mg per tablet, Take 2 tablets by mouth 4 (four) times a day, Disp: 720 tablet, Rfl: 3  •  Cholecalciferol (Vitamin D) 50 MCG (2000 UT) CAPS, Take by mouth daily, Disp: , Rfl:   •  Cranberry 1000 MG CAPS, Take 4,200 mg by mouth as needed, Disp: , Rfl:   •  escitalopram (LEXAPRO) 20 mg tablet, Take 20 mg by mouth daily  , Disp: , Rfl:   •  levothyroxine 50 mcg tablet, Take 1 tablet (50 mcg total) by mouth daily, Disp: 90 tablet, Rfl: 0  •  memantine (NAMENDA) 10 mg tablet, 1 TAB ORALLY TWICE DAILY DX: DEMENTIA, Disp: 56 tablet, Rfl: 4  •  methenamine hippurate (HIPREX) 1 g tablet, Take 1 tablet (1 g total) by mouth 2 (two) times a day with meals, Disp: 60 tablet, Rfl: 5  •  Multiple Vitamin (DAILY CHIDI PO), Take by mouth, Disp: , Rfl:   •  pantoprazole (PROTONIX) 40 mg tablet, Take 1 tablet (40 mg total) by mouth daily before breakfast, Disp: 30 tablet, Rfl: 5  •  polyethylene glycol (MIRALAX) 17 g packet, Take 17 g by mouth daily as needed (for constipation), Disp: 510 g, Rfl: 0  •  Promethazine-DM (PHENERGAN-DM) 6 25-15 mg/5 mL oral syrup, Take 5 mL by mouth 4 (four) times a day as needed for cough, Disp: 120 mL, Rfl: 0    Current Facility-Administered Medications:   •  denosumab (PROLIA) subcutaneous injection 60 mg, 60 mg, Subcutaneous, Q6 Months, Corinne Grams, MD, 60 mg at 11/01/22 1052    Recent Results (from the past 1008 hour(s))   CBC and differential    Collection Time: 03/08/23  1:50 PM   Result Value Ref Range    WBC 7 49 4 31 - 10 16 Thousand/uL    RBC 4 63 3 81 - 5 12 Million/uL    Hemoglobin 14 1 11 5 - 15 4 g/dL    Hematocrit 43 2 34 8 - 46 1 %    MCV 93 82 - 98 fL    MCH 30 5 26 8 - 34 3 pg    MCHC 32 6 31 4 - 37 4 g/dL    RDW 13 8 11 6 - 15 1 %    MPV 11 6 8 9 - 12 7 fL    Platelets 311 349 - 381 Thousands/uL    nRBC 0 /100 WBCs    Neutrophils Relative 63 43 - 75 %    Immat GRANS % 0 0 - 2 %    Lymphocytes Relative 23 14 - 44 %    Monocytes Relative 7 4 - 12 %    Eosinophils Relative 6 0 - 6 %    Basophils Relative 1 0 - 1 %    Neutrophils Absolute 4 76 1 85 - 7 62 Thousands/µL    Immature Grans Absolute 0 03 0 00 - 0 20 Thousand/uL    Lymphocytes Absolute 1 71 0 60 - 4 47 Thousands/µL    Monocytes Absolute 0 53 0 17 - 1 22 Thousand/µL    Eosinophils Absolute 0 41 0 00 - 0 61 Thousand/µL    Basophils Absolute 0 05 0 00 - 0 10 Thousands/µL Comprehensive metabolic panel    Collection Time: 03/08/23  1:50 PM   Result Value Ref Range    Sodium 139 135 - 147 mmol/L    Potassium 4 3 3 5 - 5 3 mmol/L    Chloride 109 (H) 96 - 108 mmol/L    CO2 25 21 - 32 mmol/L    ANION GAP 5 4 - 13 mmol/L    BUN 24 5 - 25 mg/dL    Creatinine 0 92 0 60 - 1 30 mg/dL    Glucose, Fasting 84 65 - 99 mg/dL    Calcium 8 8 8 3 - 10 1 mg/dL    Corrected Calcium 9 4 8 3 - 10 1 mg/dL    AST 8 5 - 45 U/L    ALT 11 (L) 12 - 78 U/L    Alkaline Phosphatase 68 46 - 116 U/L    Total Protein 6 4 6 4 - 8 4 g/dL    Albumin 3 3 (L) 3 5 - 5 0 g/dL    Total Bilirubin 0 26 0 20 - 1 00 mg/dL    eGFR 60 ml/min/1 73sq m   TSH, 3rd generation with Free T4 reflex    Collection Time: 03/08/23  1:50 PM   Result Value Ref Range    TSH 3RD GENERATON 1 850 0 450 - 4 500 uIU/mL   Lipid Panel with Direct LDL reflex    Collection Time: 03/08/23  1:50 PM   Result Value Ref Range    Cholesterol 189 See Comment mg/dL    Triglycerides 158 (H) See Comment mg/dL    HDL, Direct 60 >=50 mg/dL    LDL Calculated 97 0 - 100 mg/dL       The following portions of the patient's history were reviewed and updated as appropriate: allergies, current medications, past family history, past medical history, past social history, past surgical history and problem list      Review of Systems   Constitutional: Negative for appetite change, chills, diaphoresis, fatigue, fever and unexpected weight change  HENT: Negative for congestion, hearing loss and rhinorrhea  Eyes: Negative for visual disturbance  Respiratory: Negative for cough, chest tightness, shortness of breath and wheezing  Cardiovascular: Negative for chest pain, palpitations and leg swelling  Gastrointestinal: Negative for abdominal pain and blood in stool  Endocrine: Negative for cold intolerance, heat intolerance, polydipsia and polyuria  Genitourinary: Negative for difficulty urinating, dysuria, frequency and urgency     Musculoskeletal: Negative for arthralgias and myalgias  Skin: Negative for rash  Neurological: Negative for dizziness, weakness, light-headedness and headaches  Hematological: Does not bruise/bleed easily  Psychiatric/Behavioral: Positive for hallucinations  Negative for dysphoric mood and sleep disturbance  Objective:      Vitals:    03/22/23 1522   BP: 110/78   Pulse: 76   Resp: 16          Physical Exam  Constitutional:       Appearance: She is well-developed  HENT:      Head: Normocephalic and atraumatic  Nose: Nose normal    Eyes:      General: No scleral icterus  Conjunctiva/sclera: Conjunctivae normal       Pupils: Pupils are equal, round, and reactive to light  Neck:      Thyroid: No thyromegaly  Vascular: No JVD  Trachea: No tracheal deviation  Cardiovascular:      Rate and Rhythm: Normal rate and regular rhythm  Heart sounds: No murmur heard  No friction rub  No gallop  Pulmonary:      Effort: Pulmonary effort is normal  No respiratory distress  Breath sounds: Normal breath sounds  No wheezing or rales  Abdominal:      General: Bowel sounds are normal  There is no distension  Palpations: Abdomen is soft  There is no mass  Tenderness: There is no abdominal tenderness  There is no guarding or rebound  Musculoskeletal:         General: No tenderness  Cervical back: Normal range of motion and neck supple  Lymphadenopathy:      Cervical: No cervical adenopathy  Skin:     General: Skin is warm and dry  Findings: No erythema or rash  Neurological:      Mental Status: She is alert and oriented to person, place, and time  Cranial Nerves: No cranial nerve deficit  Psychiatric:         Behavior: Behavior normal          Thought Content:  Thought content normal          Judgment: Judgment normal

## 2023-05-02 DIAGNOSIS — F02.80 DEMENTIA ASSOCIATED WITH OTHER UNDERLYING DISEASE WITHOUT BEHAVIORAL DISTURBANCE (HCC): ICD-10-CM

## 2023-05-02 RX ORDER — MEMANTINE HYDROCHLORIDE 10 MG/1
TABLET ORAL
Qty: 56 TABLET | Refills: 4 | Status: SHIPPED | OUTPATIENT
Start: 2023-05-02

## 2023-05-03 ENCOUNTER — TELEPHONE (OUTPATIENT)
Age: 78
End: 2023-05-03

## 2023-05-03 NOTE — TELEPHONE ENCOUNTER
PROLIA-Received SOB/Appt  scheduled    Spoke w/patient    Last dose-11/1/22    *Appt   Scheduled-5/11/23  *Med ordered-0115  Delivery-5/4/23

## 2023-05-16 ENCOUNTER — TELEPHONE (OUTPATIENT)
Age: 78
End: 2023-05-16

## 2023-05-16 ENCOUNTER — CLINICAL SUPPORT (OUTPATIENT)
Age: 78
End: 2023-05-16

## 2023-05-16 DIAGNOSIS — M85.89 OSTEOPENIA OF MULTIPLE SITES: Primary | ICD-10-CM

## 2023-05-23 ENCOUNTER — OFFICE VISIT (OUTPATIENT)
Dept: NEUROLOGY | Facility: CLINIC | Age: 78
End: 2023-05-23

## 2023-05-23 VITALS — TEMPERATURE: 97.6 F | DIASTOLIC BLOOD PRESSURE: 74 MMHG | SYSTOLIC BLOOD PRESSURE: 126 MMHG | HEART RATE: 78 BPM

## 2023-05-23 DIAGNOSIS — G20 PARKINSON'S DISEASE (HCC): Primary | ICD-10-CM

## 2023-05-23 DIAGNOSIS — F03.90 DEMENTIA WITHOUT BEHAVIORAL DISTURBANCE (HCC): ICD-10-CM

## 2023-05-23 DIAGNOSIS — R44.0 AUDITORY HALLUCINATION: ICD-10-CM

## 2023-05-23 RX ORDER — MINERAL OIL AND PETROLATUM 150; 830 MG/G; MG/G
OINTMENT OPHTHALMIC
COMMUNITY

## 2023-05-23 RX ORDER — ZINC OXIDE 20 %
OINTMENT (GRAM) TOPICAL AS NEEDED
COMMUNITY

## 2023-05-23 NOTE — PROGRESS NOTES
Patient ID: Ken Myrick is a 69 y o  female  Assessment/Plan:    Parkinson's disease [facility])  Patient with Parkinson's disease complicated by hallucinations and delusions  Overall doing well  She has assistance for dressing and showering  She can feed herself  Medications are managed by nursing staff at facility  She continues to have confusion and hallucinations at times  They are not scary or threatening to her however they may be more frequent then in the past   Once again discussed the treatment option for hallucinations  These would include reducing her PD medications (she did have benefit with stopping the Neupro patch in the past)  With this however we could see worsening of PD symptoms  The other option would be to start a medication for the hallucinations such as Seroquel or Nuplazid  Both have a black box warning of sudden cardiac death in elderly with dementia  For now given the hallucinations are not threatening she can be redirected at times, the family has opted not to start any treatment  If hallucinations worsen may reconsider in the future  Overall her exam was stable, with no clear worsening of symptoms since the last visit  Will not make any changes to her Sinemet dose at this time  Will also remain on current dose of Namenda for her cognition  May consider weaning off of this in the future if no clear benefit  For now she is tolerating it well  Patient was encouraged to increase mind stimulating activities such as reading, crosswords, word searches, puzzles, Soduku, solitaire, coloring and other brain games  We also discussed the importance of staying physically active and eating a health diet such as the Mediterranean or MIND diet  She should try and keep up activities at the facility  She was encouraged to remain active  She will be completing PT and OT in the near future  Dementia without behavioral disturbance ([facility] [address] [address] )  She remains on Namenda  Subjective:    Jayce Obrien is a right-handed woman with levodopa-responsive Parkinson's disease  To review, symptom onset in  with left foot tremor, later complicated by non-motor wearing-off, slight dyskinesias, FoG  In Fall of  she noted some balance trouble and later developed micrographia  She was started on Azilect 1mg with little notable improvement  Neupro patch later added  Sinemet was added later and has been titrated up over time  At her last visit she had some worsening of her gait and freezing episodes with no benefit on higher rivastigmine dosing  Sinemet was slightly increased  She had a telemedicine visit with Dr Amrik Taylor at [facility] to discuss MRI guided US  It was not felt to be a good option for her given primary use for tremor control and tremors not particularly bothersome to patient  At her last visit no changes were made to her Sinemet dose  She was having some hallucinations however not bothersome at the time          INTERVAL HISTORY:  She resides at [facility], she is in a room alone   She eats meals in the dining frank  Nurses give all of her medications   She needs constant reminders to stay in her wheel chair   She is not walking at all given concern for falls   She has almost done with PT and OT, she does walk with therapy and a walker   She tends to bend over in her wheel chair   She can eat by herself   No trouble with swallowing   She needs assistance with dressing and showering,  helps as well   She will often talk with her  son, she can see animals   Getting harder to redirect at times   Tremors are the same, no worsening     Current medications and timing:  - Sinemet 25/100; 2tabs qid (8am, 12:30, 4:30pm, 8:30pm)  - Namenda 10mg bid       Prior medication trials:  - Neupro Patch 2mg evening - stopped due to hallucinations, confusion and fatigue   - Azilect 0 5mg in the evening (stopped by PCP, gait worsened without it)  - Amantadine - side effects   - Exelon patch 9 5mg - itchy blister  - rivastigmine pill- GI side effects      I personally reviewed and updated the ROS  Objective:    Blood pressure 126/74, pulse 78, temperature 97 6 °F (36 4 °C), not currently breastfeeding  Physical Exam  HENT:      Right Ear: Hearing normal       Left Ear: Hearing normal    Eyes:      Extraocular Movements: Extraocular movements intact  Pupils: Pupils are equal, round, and reactive to light  Pulmonary:      Effort: Pulmonary effort is normal    Neurological:      Mental Status: She is alert  Motor: Motor strength is normal    Psychiatric:         Speech: Speech normal          Neurological Exam  Mental Status  Alert  Oriented only to person and situation  Speech is normal  Language is fluent with no aphasia  Attention and concentration are normal   Appeared confused, hard to redirect at times   Unable to tell me her husbands name, knew her son     [facility] 10/30 - 1/4/23 - required frequent redirecting   [facility] 11/30 - 8/9/22  [facility] 25/30 - 6/15/21  Cranial Nerves  CN III, IV, VI: Extraocular movements intact bilaterally  Pupils equal round and reactive to light bilaterally  CN V:  Right: Facial sensation is normal   Left: Facial sensation is normal on the left  CN VII: Full and symmetric facial movement  CN VIII:  Right: Hearing is normal   Left: Hearing is normal   CN XI: Shoulder shrug strength is normal   CN XII: Tongue midline without atrophy or fasciculations  Motor   Increased muscle tone  Strength is 5/5 throughout all four extremities  Sensory  Light touch is normal in upper and lower extremities  Coordination  Right: Finger-to-nose normal  Rapid alternating movement normal Left: Finger-to-nose normal  Rapid alternating movement abnormality:  See MDS UPDRS III    Tendency to sit bent forward in wheel chair   Gait   Unable to rise from chair without using arms  Not formally ambulated         UPDRS motor:                            HKGN since last dose:   1/4/23 5/23/23   Speech   0 0   Facial Expression  1 1   Rigidity - Neck   0     Rigidity - Upper Extremity (Right)   0 0   Rigidity - Upper Extremity (Left)    1 1   Rigidity - Lower Extremity (Right)   0 0   Rigidity - Lower Extremity (Left)    1 0   Finger Taps (Right)    0 0   Finger Taps (Left)    1 0   Hand Movement (Right)   0 0   Hand Movement (Left)    1 0   Pronation/Supination (Right)   1 1   Pronation/Supination (Left)    1 1   Toe Tapping (Right)  0 1   Toe Tapping (Left)  1 1   Leg Agility (Right)   0 1   Leg Agility (Left)    0 2   Arising from Chair    1 -   Gait     -   Freezing of Gait   -   Postural Stability      -   Posture   2   Global spontaneity of movement  1 1   Postural Tremor (Right)  0 0   Postural Tremor (Left)  0 0   Kinetic Tremor (Right)   0 0   Kinetic Tremor (Left)   0 0   Rest tremor amplitude RUE  0 0   Rest tremor amplitude LUE  0 0   Rest tremor amplitude RLE  0 0   Reset tremor amplitude LLE  0 0   Lip/Jaw Tremor    0   Consistency of tremor  0 0   Motor Exam Total:          ROS:    Review of Systems   Constitutional: Positive for fatigue  Negative for appetite change and fever  HENT: Negative  Negative for hearing loss, tinnitus, trouble swallowing and voice change  Eyes: Negative  Negative for photophobia, pain and visual disturbance  Respiratory: Negative  Negative for shortness of breath  Cardiovascular: Negative  Negative for palpitations  Gastrointestinal: Negative  Negative for abdominal pain, nausea and vomiting  Endocrine: Negative  Negative for cold intolerance  Genitourinary: Negative  Negative for dysuria, frequency and urgency  Musculoskeletal: Positive for back pain (Lower back), gait problem ( states yes) and myalgias (Left arm)  Negative for neck pain  Balance Issues  Falls  Posture while sitting in wheelchair is all the way bent over     Skin: Negative    Negative for rash    Allergic/Immunologic: Negative  Neurological: Positive for tremors (Left foot, has stayed the same) and weakness (Core muscles)  Negative for dizziness, seizures, syncope, facial asymmetry, speech difficulty, light-headedness, numbness and headaches  Hematological: Bruises/bleeds easily (Bruise)  Psychiatric/Behavioral: Positive for confusion, hallucinations and sleep disturbance (fights sleeping)  Memory Issues     All other systems reviewed and are negative

## 2023-05-23 NOTE — PATIENT INSTRUCTIONS
Patient with Parkinson's disease complicated by hallucinations and delusions  Overall doing well  She has assistance for dressing and showering  She can feed herself  Medications are managed by nursing staff at facility  She continues to have confusion and hallucinations at times  They are not scary or threatening to her however they may be more frequent then in the past   Once again discussed the treatment option for hallucinations  These would include reducing her PD medications (she did have benefit with stopping the Neupro patch in the past)  With this however we could see worsening of PD symptoms  The other option would be to start a medication for the hallucinations such as Seroquel or Nuplazid  Both have a black box warning of sudden cardiac death in elderly with dementia  For now given the hallucinations are not threatening she can be redirected at times, the family has opted not to start any treatment  If hallucinations worsen may reconsider in the future  Overall her exam was stable, with no clear worsening of symptoms since the last visit  Will not make any changes to her Sinemet dose at this time  Will also remain on current dose of Namenda for her cognition  May consider weaning off of this in the future if no clear benefit  For now she is tolerating it well  Patient was encouraged to increase mind stimulating activities such as reading, crosswords, word searches, puzzles, Soduku, solitaire, coloring and other brain games  We also discussed the importance of staying physically active and eating a health diet such as the Mediterranean or MIND diet  She should try and keep up activities at the facility  She was encouraged to remain active  She will be completing PT and OT in the near future

## 2023-05-31 ENCOUNTER — TELEPHONE (OUTPATIENT)
Age: 78
End: 2023-05-31

## 2023-05-31 DIAGNOSIS — J30.9 ALLERGIC RHINITIS, UNSPECIFIED SEASONALITY, UNSPECIFIED TRIGGER: Primary | ICD-10-CM

## 2023-05-31 RX ORDER — LORATADINE 10 MG/1
TABLET ORAL
Qty: 30 TABLET | Refills: 7 | Status: SHIPPED | OUTPATIENT
Start: 2023-05-31

## 2023-05-31 NOTE — TELEPHONE ENCOUNTER
Harley Oscar  from [place] [address]      She has head and nasal congestion  She's coughing frequently at bedtime When she's lying down, her lungs are clear  She's afebrile, but you definitely can hear that she's congested when she speaks  pharmacy is [facility]

## 2023-06-27 DIAGNOSIS — G20 PARKINSON'S DISEASE (HCC): ICD-10-CM

## 2023-06-28 ENCOUNTER — OFFICE VISIT (OUTPATIENT)
Age: 78
End: 2023-06-28
Payer: COMMERCIAL

## 2023-06-28 ENCOUNTER — APPOINTMENT (OUTPATIENT)
Age: 78
End: 2023-06-28
Payer: COMMERCIAL

## 2023-06-28 ENCOUNTER — RA CDI HCC (OUTPATIENT)
Dept: OTHER | Facility: HOSPITAL | Age: 78
End: 2023-06-28

## 2023-06-28 VITALS
WEIGHT: 138 LBS | HEIGHT: 63 IN | DIASTOLIC BLOOD PRESSURE: 60 MMHG | RESPIRATION RATE: 18 BRPM | BODY MASS INDEX: 24.45 KG/M2 | SYSTOLIC BLOOD PRESSURE: 116 MMHG | OXYGEN SATURATION: 96 % | HEART RATE: 64 BPM

## 2023-06-28 DIAGNOSIS — E03.9 ACQUIRED HYPOTHYROIDISM: ICD-10-CM

## 2023-06-28 DIAGNOSIS — G89.29 CHRONIC BILATERAL LOW BACK PAIN WITHOUT SCIATICA: ICD-10-CM

## 2023-06-28 DIAGNOSIS — R06.89 DECREASED BREATH SOUNDS AT LEFT LUNG BASE: ICD-10-CM

## 2023-06-28 DIAGNOSIS — F03.90 DEMENTIA WITHOUT BEHAVIORAL DISTURBANCE (HCC): ICD-10-CM

## 2023-06-28 DIAGNOSIS — G20 PARKINSON'S DISEASE (HCC): Primary | ICD-10-CM

## 2023-06-28 DIAGNOSIS — M54.50 CHRONIC BILATERAL LOW BACK PAIN WITHOUT SCIATICA: ICD-10-CM

## 2023-06-28 DIAGNOSIS — F33.41 RECURRENT MAJOR DEPRESSIVE DISORDER, IN PARTIAL REMISSION (HCC): ICD-10-CM

## 2023-06-28 PROCEDURE — 71046 X-RAY EXAM CHEST 2 VIEWS: CPT

## 2023-06-28 PROCEDURE — 99214 OFFICE O/P EST MOD 30 MIN: CPT | Performed by: INTERNAL MEDICINE

## 2023-06-28 NOTE — PROGRESS NOTES
[facility]  coding opportunities     [id number] claim from [address] 4 4/20/23     Chart Reviewed number of suggestions sent to Provider: 1   GR    Patients Insurance     Medicare Insurance: [address] [place] [address]

## 2023-06-28 NOTE — PATIENT INSTRUCTIONS
Decreased breath sounds on left base may be related to hiatal hernia and skeletal deformity with significant scoliosis  Check chest x-ray to rule out any effusion or other lung disease      Parkinson's disease-continue close follow-up with neurology    Hypothyroidism-continue levothyroxine and check TSH prior to follow-up in 3 months    Depression-continue on Wellbutrin and Lexapro

## 2023-06-28 NOTE — PROGRESS NOTES
Assessment/Plan:    Diagnoses and all orders for this visit:    Parkinson's disease ([facility] [address] )  -     CBC and differential; Future  -     Comprehensive metabolic panel; Future    Dementia without behavioral disturbance (HCC)    Chronic bilateral low back pain without sciatica    Acquired hypothyroidism  -     TSH, 3rd generation with Free T4 reflex; Future    Decreased breath sounds at left lung base  -     XR chest pa & lateral; Future    Recurrent major depressive disorder, in partial remission [facility])              Patient Instructions   Decreased breath sounds on left base may be related to hiatal hernia and skeletal deformity with significant scoliosis  Check chest x-ray to rule out any effusion or other lung disease  Parkinson's disease-continue close follow-up with neurology    Hypothyroidism-continue levothyroxine and check TSH prior to follow-up in 3 months    Depression-continue on Wellbutrin and Lexapro      Subjective:      Patient ID: Jack Siu is a 69 y o  female    F/u MMP and review labs, accompanied by Emi and son, Jimmy Speaker  Upset she is unable to drive  Notes low back pain lately  roger notes she has been slumped over in wc most of the time  Still working w/ PT to strengthen core  PD-taking rx as directed, f/b Dr Neena Anaya  THN/HPL-taking rx as directed, but no home bp's  Hypothyroid-taking rx as directed  Dementia-progressive, no aggressive behaviors, still w/ auditory hallucinations that aren't terribly troubling  Depression stable on present regimen  GERD-stable w/ pantoprazole  Osteoporosis-taking prolia biy          Current Outpatient Medications:   •  acetaminophen (TYLENOL) 650 mg CR tablet, Take 650mg tid x 5 days then tid prn for pain, Disp: 30 tablet, Rfl: 0  •  artificial tear (LUBRIFRESH P M ) 83-15 % ophthalmic ointment, daily at bedtime, Disp: , Rfl:   •  Ascorbic Acid (vitamin C) 100 MG tablet, Take by mouth, Disp: , Rfl:   •  buPROPion (Wellbutrin XL) 150 mg 24 hr tablet, Take 1 tablet (150 mg total) by mouth every morning, Disp: 90 tablet, Rfl: 3  •  carbidopa-levodopa (SINEMET)  mg per tablet, 2 TABS ORALLY 4 TIMES DAILY DX: PARKINSON'S DISEASE, Disp: 224 tablet, Rfl: 4  •  Cholecalciferol (Vitamin D) 50 MCG (2000 UT) CAPS, Take by mouth daily, Disp: , Rfl:   •  Cranberry 1000 MG CAPS, Take 4,200 mg by mouth as needed, Disp: , Rfl:   •  escitalopram (LEXAPRO) 20 mg tablet, Take 20 mg by mouth daily  , Disp: , Rfl:   •  levothyroxine 50 mcg tablet, Take 1 tablet (50 mcg total) by mouth daily, Disp: 90 tablet, Rfl: 0  •  loratadine (CLARITIN) 10 mg tablet, 10 mg daily x 7 days, then use 10 mg qd prn congestion, Disp: 30 tablet, Rfl: 7  •  memantine (NAMENDA) 10 mg tablet, 1 TAB ORALLY TWICE DAILY DX: DEMENTIA, Disp: 56 tablet, Rfl: 4  •  methenamine hippurate (HIPREX) 1 g tablet, Take 1 tablet (1 g total) by mouth 2 (two) times a day with meals, Disp: 60 tablet, Rfl: 5  •  Multiple Vitamin (DAILY CHIDI PO), Take by mouth, Disp: , Rfl:   •  Nutritional Supplements (ENSURE PO), Take by mouth 3 (three) times a day, Disp: , Rfl:   •  pantoprazole (PROTONIX) 40 mg tablet, Take 1 tablet (40 mg total) by mouth daily before breakfast, Disp: 30 tablet, Rfl: 5  •  polyethylene glycol (MIRALAX) 17 g packet, Take 17 g by mouth daily as needed (for constipation), Disp: 510 g, Rfl: 0  •  zinc oxide 20 % ointment, Apply topically as needed, Disp: , Rfl:   •  aspirin 81 mg chewable tablet, Chew 1 tablet (81 mg total) daily (Patient not taking: Reported on 6/28/2023), Disp: , Rfl:   •  Promethazine-DM (PHENERGAN-DM) 6 25-15 mg/5 mL oral syrup, Take 5 mL by mouth 4 (four) times a day as needed for cough (Patient not taking: Reported on 6/28/2023), Disp: 120 mL, Rfl: 0    Current Facility-Administered Medications:   •  denosumab (PROLIA) subcutaneous injection 60 mg, 60 mg, Subcutaneous, Q6 Months, Sundar Carpenter MD, 60 mg at 05/16/23 1021    No results found for this or any previous visit (from the past 1008 hour(s))  The following portions of the patient's history were reviewed and updated as appropriate: allergies, current medications, past family history, past medical history, past social history, past surgical history and problem list      Review of Systems   Constitutional: Negative for appetite change, chills, diaphoresis, fatigue, fever and unexpected weight change  HENT: Negative for congestion, hearing loss and rhinorrhea  Eyes: Negative for visual disturbance  Respiratory: Negative for cough, chest tightness, shortness of breath and wheezing  Cardiovascular: Negative for chest pain, palpitations and leg swelling  Gastrointestinal: Negative for abdominal pain and blood in stool  Endocrine: Negative for cold intolerance, heat intolerance, polydipsia and polyuria  Genitourinary: Negative for difficulty urinating, dysuria, frequency and urgency  Musculoskeletal: Negative for arthralgias and myalgias  Skin: Negative for rash  Neurological: Negative for dizziness, weakness, light-headedness and headaches  Hematological: Does not bruise/bleed easily  Psychiatric/Behavioral: Negative for dysphoric mood and sleep disturbance  Objective:      Vitals:    06/28/23 1451   BP: 116/60   Pulse: 64   Resp: 18   SpO2: 96%          Physical Exam  Constitutional:       Appearance: She is well-developed  HENT:      Head: Normocephalic and atraumatic  Nose: Nose normal    Eyes:      General: No scleral icterus  Conjunctiva/sclera: Conjunctivae normal       Pupils: Pupils are equal, round, and reactive to light  Neck:      Thyroid: No thyromegaly  Vascular: No JVD  Trachea: No tracheal deviation  Cardiovascular:      Rate and Rhythm: Normal rate and regular rhythm  Heart sounds: No murmur heard  No friction rub  No gallop  Pulmonary:      Effort: Pulmonary effort is normal  No respiratory distress  Breath sounds: No wheezing or rales  Comments: Decreased bs left base  Musculoskeletal:         General: No deformity  Cervical back: Normal range of motion and neck supple  Lymphadenopathy:      Cervical: No cervical adenopathy  Skin:     General: Skin is warm and dry  Coloration: Skin is not pale  Findings: No erythema or rash  Neurological:      Mental Status: She is alert and oriented to person, place, and time  Cranial Nerves: No cranial nerve deficit  Psychiatric:         Behavior: Behavior normal          Thought Content:  Thought content normal          Judgment: Judgment normal

## 2023-06-30 ENCOUNTER — TELEPHONE (OUTPATIENT)
Age: 78
End: 2023-06-30

## 2023-07-23 NOTE — TELEPHONE ENCOUNTER
Caridad abebe  Needs to change Clarita's prolia appt  Please call her  [phone] 
Returned Caridad's, pt's daughter, call  M to call to reschedule prolia inj 
FAMILY HISTORY:  No pertinent family history in first degree relatives

## 2023-08-29 ENCOUNTER — TELEPHONE (OUTPATIENT)
Dept: NEUROLOGY | Facility: CLINIC | Age: 78
End: 2023-08-29

## 2023-08-29 NOTE — TELEPHONE ENCOUNTER
Voicemail received from [facility]. Calling to cancel September appt with Austin Zacarias, and keep December appt with . Call returned to [facility], [phone]. Confirmed they would like to cancel September appt and keep  appt. Family would like to see .  added December appt to waitlist.    Cancelled September appt and added December appt to waitlist.

## 2023-09-11 ENCOUNTER — RA CDI HCC (OUTPATIENT)
Dept: OTHER | Facility: HOSPITAL | Age: 78
End: 2023-09-11

## 2023-09-11 NOTE — PROGRESS NOTES
[address] [facility] coding opportunities       Chart reviewed, no opportunity found: [address]     Patients Insurance     Medicare Insurance: [employer]

## 2023-09-13 ENCOUNTER — RA CDI HCC (OUTPATIENT)
Dept: OTHER | Facility: HOSPITAL | Age: 78
End: 2023-09-13

## 2023-09-14 NOTE — PROGRESS NOTES
[address] [facility] coding opportunities     I73.9 [employer] claim 4/20/23      Chart Reviewed number of suggestions sent to Provider: 1     GR    Patients Insurance     Medicare Insurance: [facility]

## 2023-09-19 ENCOUNTER — OFFICE VISIT (OUTPATIENT)
Age: 78
End: 2023-09-19
Payer: COMMERCIAL

## 2023-09-19 VITALS
WEIGHT: 130.6 LBS | DIASTOLIC BLOOD PRESSURE: 62 MMHG | OXYGEN SATURATION: 95 % | HEART RATE: 88 BPM | HEIGHT: 63 IN | BODY MASS INDEX: 23.14 KG/M2 | SYSTOLIC BLOOD PRESSURE: 122 MMHG | TEMPERATURE: 98 F

## 2023-09-19 DIAGNOSIS — F03.90 DEMENTIA WITHOUT BEHAVIORAL DISTURBANCE (HCC): ICD-10-CM

## 2023-09-19 DIAGNOSIS — K21.9 GASTROESOPHAGEAL REFLUX DISEASE WITHOUT ESOPHAGITIS: ICD-10-CM

## 2023-09-19 DIAGNOSIS — N39.0 FREQUENT UTI: ICD-10-CM

## 2023-09-19 DIAGNOSIS — G20.A1 PARKINSON'S DISEASE: Primary | ICD-10-CM

## 2023-09-19 DIAGNOSIS — M85.89 OSTEOPENIA OF MULTIPLE SITES: ICD-10-CM

## 2023-09-19 DIAGNOSIS — R44.0 AUDITORY HALLUCINATION: ICD-10-CM

## 2023-09-19 DIAGNOSIS — E03.9 ACQUIRED HYPOTHYROIDISM: ICD-10-CM

## 2023-09-19 DIAGNOSIS — F33.41 RECURRENT MAJOR DEPRESSIVE DISORDER, IN PARTIAL REMISSION (HCC): ICD-10-CM

## 2023-09-19 PROCEDURE — 99215 OFFICE O/P EST HI 40 MIN: CPT | Performed by: INTERNAL MEDICINE

## 2023-09-19 PROCEDURE — G0439 PPPS, SUBSEQ VISIT: HCPCS | Performed by: INTERNAL MEDICINE

## 2023-09-19 NOTE — PATIENT INSTRUCTIONS
Labs reviewed and compared to prior    Hypothyroidism stable on levothyroxine    Parkinson's disease under care of of neurology remains clinically stable. Patient continues to have rare nontroubling auditory or visual hallucinations for which we have all agreed no treatment is necessary at this time. Recurrent UTIs have been stable with methenamine    GERD stable with pantoprazole    Osteoporosis-continue biannual Prolia    Depression appears to be stable with Lexapro    POLST completed, AD reviewed. 6-month follow-up after labs, sooner as needed. Medicare Preventive Visit Patient Instructions  Thank you for completing your Welcome to Medicare Visit or Medicare Annual Wellness Visit today. Your next wellness visit will be due in one year (9/19/2024). The screening/preventive services that you may require over the next 5-10 years are detailed below. Some tests may not apply to you based off risk factors and/or age. Screening tests ordered at today's visit but not completed yet may show as past due. Also, please note that scanned in results may not display below. Preventive Screenings:  Service Recommendations Previous Testing/Comments   Colorectal Cancer Screening  * Colonoscopy    * Fecal Occult Blood Test (FOBT)/Fecal Immunochemical Test (FIT)  * Fecal DNA/Cologuard Test  * Flexible Sigmoidoscopy Age: 43-73 years old   Colonoscopy: every 10 years (may be performed more frequently if at higher risk)  OR  FOBT/FIT: every 1 year  OR  Cologuard: every 3 years  OR  Sigmoidoscopy: every 5 years  Screening may be recommended earlier than age 39 if at higher risk for colorectal cancer. Also, an individualized decision between you and your healthcare provider will decide whether screening between the ages of 77-80 would be appropriate.  Colonoscopy: 09/05/2018  FOBT/FIT: Not on file  Cologuard: Not on file  Sigmoidoscopy: Not on file          Breast Cancer Screening Age: 36 years old  Frequency: every 1-2 years  Not required if history of left and right mastectomy Mammogram: 09/14/2021        Cervical Cancer Screening Between the ages of 21-29, pap smear recommended once every 3 years. Between the ages of 32-69, can perform pap smear with HPV co-testing every 5 years. Recommendations may differ for women with a history of total hysterectomy, cervical cancer, or abnormal pap smears in past. Pap Smear: Not on file    Screening Not Indicated   Hepatitis C Screening Once for adults born between 1945 and 1965  More frequently in patients at high risk for Hepatitis C Hep C Antibody: 12/31/2019    Screening Current   Diabetes Screening 1-2 times per year if you're at risk for diabetes or have pre-diabetes Fasting glucose: 84 mg/dL (3/8/2023)  A1C: 5.4 % (8/8/2022)  Screening Current   Cholesterol Screening Once every 5 years if you don't have a lipid disorder. May order more often based on risk factors. Lipid panel: 03/16/2023    Screening Not Indicated  History Lipid Disorder     Other Preventive Screenings Covered by Medicare:  Abdominal Aortic Aneurysm (AAA) Screening: covered once if your at risk. You're considered to be at risk if you have a family history of AAA. Lung Cancer Screening: covers low dose CT scan once per year if you meet all of the following conditions: (1) Age 48-67; (2) No signs or symptoms of lung cancer; (3) Current smoker or have quit smoking within the last 15 years; (4) You have a tobacco smoking history of at least 20 pack years (packs per day multiplied by number of years you smoked); (5) You get a written order from a healthcare provider.   Glaucoma Screening: covered annually if you're considered high risk: (1) You have diabetes OR (2) Family history of glaucoma OR (3)  aged 48 and older OR (3)  American aged 72 and older  Osteoporosis Screening: covered every 2 years if you meet one of the following conditions: (1) You're estrogen deficient and at risk for osteoporosis based off medical history and other findings; (2) Have a vertebral abnormality; (3) On glucocorticoid therapy for more than 3 months; (4) Have primary hyperparathyroidism; (5) On osteoporosis medications and need to assess response to drug therapy. Last bone density test (DXA Scan): 09/16/2022. HIV Screening: covered annually if you're between the age of 14-79. Also covered annually if you are younger than 13 and older than 72 with risk factors for HIV infection. For pregnant patients, it is covered up to 3 times per pregnancy. Immunizations:  Immunization Recommendations   Influenza Vaccine Annual influenza vaccination during flu season is recommended for all persons aged >= 6 months who do not have contraindications   Pneumococcal Vaccine   * Pneumococcal conjugate vaccine = PCV13 (Prevnar 13), PCV15 (Vaxneuvance), PCV20 (Prevnar 20)  * Pneumococcal polysaccharide vaccine = PPSV23 (Pneumovax) Adults 20-63 years old: 1-3 doses may be recommended based on certain risk factors  Adults 72 years old: 1-2 doses may be recommended based off what pneumonia vaccine you previously received   Hepatitis B Vaccine 3 dose series if at intermediate or high risk (ex: diabetes, end stage renal disease, liver disease)   Tetanus (Td) Vaccine - COST NOT COVERED BY MEDICARE PART B Following completion of primary series, a booster dose should be given every 10 years to maintain immunity against tetanus. Td may also be given as tetanus wound prophylaxis. Tdap Vaccine - COST NOT COVERED BY MEDICARE PART B Recommended at least once for all adults. For pregnant patients, recommended with each pregnancy.    Shingles Vaccine (Shingrix) - COST NOT COVERED BY MEDICARE PART B  2 shot series recommended in those aged 48 and above     Health Maintenance Due:      Topic Date Due    DXA SCAN  09/16/2024    Hepatitis C Screening  Completed    Colorectal Cancer Screening  Discontinued     Immunizations Due:      Topic Date Due COVID-19 Vaccine (5 - Moderna series) 08/18/2022    Influenza Vaccine (1) 09/01/2023     Advance Directives   What are advance directives? Advance directives are legal documents that state your wishes and plans for medical care. These plans are made ahead of time in case you lose your ability to make decisions for yourself. Advance directives can apply to any medical decision, such as the treatments you want, and if you want to donate organs. What are the types of advance directives? There are many types of advance directives, and each state has rules about how to use them. You may choose a combination of any of the following:  Living will: This is a written record of the treatment you want. You can also choose which treatments you do not want, which to limit, and which to stop at a certain time. This includes surgery, medicine, IV fluid, and tube feedings. Durable power of  for [facility]): This is a written record that states who you want to make healthcare choices for you when you are unable to make them for yourself. This person, called a proxy, is usually a family member or a friend. You may choose more than 1 proxy. Do not resuscitate (DNR) order:  A DNR order is used in case your heart stops beating or you stop breathing. It is a request not to have certain forms of treatment, such as CPR. A DNR order may be included in other types of advance directives. Medical directive: This covers the care that you want if you are in a coma, near death, or unable to make decisions for yourself. You can list the treatments you want for each condition. Treatment may include pain medicine, surgery, blood transfusions, dialysis, IV or tube feedings, and a ventilator (breathing machine). Values history: This document has questions about your views, beliefs, and how you feel and think about life. This information can help others choose the care that you would choose.   Why are advance directives important? An advance directive helps you control your care. Although spoken wishes may be used, it is better to have your wishes written down. Spoken wishes can be misunderstood, or not followed. Treatments may be given even if you do not want them. An advance directive may make it easier for your family to make difficult choices about your care. Urinary Incontinence   Urinary incontinence (UI)  is when you lose control of your bladder. UI develops because your bladder cannot store or empty urine properly. The 3 most common types of UI are stress incontinence, urge incontinence, or both. Medicines:   May be given to help strengthen your bladder control. Report any side effects of medication to your healthcare provider. Do pelvic muscle exercises often:  Your pelvic muscles help you stop urinating. Squeeze these muscles tight for 5 seconds, then relax for 5 seconds. Gradually work up to squeezing for 10 seconds. Do 3 sets of 15 repetitions a day, or as directed. This will help strengthen your pelvic muscles and improve bladder control. Train your bladder:  Go to the bathroom at set times, such as every 2 hours, even if you do not feel the urge to go. You can also try to hold your urine when you feel the urge to go. For example, hold your urine for 5 minutes when you feel the urge to go. As that becomes easier, hold your urine for 10 minutes. Self-care:   Keep a UI record. Write down how often you leak urine and how much you leak. Make a note of what you were doing when you leaked urine. Drink liquids as directed. You may need to limit the amount of liquid you drink to help control your urine leakage. Do not drink any liquid right before you go to bed. Limit or do not have drinks that contain caffeine or alcohol. Prevent constipation. Eat a variety of high-fiber foods. Good examples are high-fiber cereals, beans, vegetables, and whole-grain breads.  Walking is the best way to trigger your intestines to have a bowel movement. Exercise regularly and maintain a healthy weight. Weight loss and exercise will decrease pressure on your bladder and help you control your leakage. Use a catheter as directed  to help empty your bladder. A catheter is a tiny, plastic tube that is put into your bladder to drain your urine. Go to behavior therapy as directed. Behavior therapy may be used to help you learn to control your urge to urinate. © Copyright [employer] 2018 Information is for End User's use only and may not be sold, redistributed or otherwise used for commercial purposes.  All illustrations and images included in CareNotes® are the copyrighted property of A.D.A.M., Inc. or  [employer]

## 2023-09-20 ENCOUNTER — TELEPHONE (OUTPATIENT)
Age: 78
End: 2023-09-20

## 2023-09-20 NOTE — TELEPHONE ENCOUNTER
Rayray Vaughan called from [address] in [facility] - facility need current Medication List and Allergies List - fax # [phone], faxed, confirmation received.      Facility @ [phone]

## 2023-11-20 ENCOUNTER — TELEPHONE (OUTPATIENT)
Age: 78
End: 2023-11-20

## 2023-12-07 ENCOUNTER — CLINICAL SUPPORT (OUTPATIENT)
Age: 78
End: 2023-12-07
Payer: COMMERCIAL

## 2023-12-07 DIAGNOSIS — M85.89 OSTEOPENIA OF MULTIPLE SITES: Primary | ICD-10-CM

## 2023-12-07 PROCEDURE — 96372 THER/PROPH/DIAG INJ SC/IM: CPT | Performed by: INTERNAL MEDICINE

## 2023-12-08 ENCOUNTER — TELEPHONE (OUTPATIENT)
Dept: NEUROLOGY | Facility: CLINIC | Age: 78
End: 2023-12-08

## 2023-12-08 NOTE — TELEPHONE ENCOUNTER
Spoke with Manuel Gilmore. He just wanted to give Dr. Audrey Adam an update. Manuel Gilmore stated that he feels uncomfortable taking about [employer] when she is in the room. He notices that pt spends a lot of time staring at seams in clothing. She will stare at seams in her shirt, pants, socks, etc. If there is too much food on her plate, she will play with it. Will eat if food is given one item at a time, in small portions. Has not been having "bad" hallucinations. Will tell Manuel Gilmore "do you see so and so over there", and point. She talks in a whisper, and has lost a lot of vocabulary words. When talking she often stops in mid sentence. Pt no longer ambulates, she is in a wheelchair. She also acts as if she has something in her fingers when nothing is there, and will try to give it to another person. Routed to provider as a FYI.

## 2023-12-08 NOTE — TELEPHONE ENCOUNTER
received vm from 12/8 at 10:09am-I'm calling for Margaret De Anda, this is her caretaker Valentina nelson. I have a scheduled appointment on Monday with Dr Darcie Regalado. I hope it's going to be dr Darcie Regalado rather than the nurse practitioner. I have some things I like to talk to Dr. Darcie Regalado or her representative. So that i don't talk in front of Margaret De Anda, Thank you.   [phone]

## 2023-12-11 ENCOUNTER — OFFICE VISIT (OUTPATIENT)
Dept: NEUROLOGY | Facility: CLINIC | Age: 78
End: 2023-12-11
Payer: COMMERCIAL

## 2023-12-11 ENCOUNTER — TELEPHONE (OUTPATIENT)
Age: 78
End: 2023-12-11

## 2023-12-11 VITALS
SYSTOLIC BLOOD PRESSURE: 103 MMHG | WEIGHT: 130 LBS | HEIGHT: 63 IN | TEMPERATURE: 98.2 F | DIASTOLIC BLOOD PRESSURE: 68 MMHG | OXYGEN SATURATION: 98 % | BODY MASS INDEX: 23.04 KG/M2 | HEART RATE: 51 BPM

## 2023-12-11 DIAGNOSIS — R44.1 HALLUCINATION, VISUAL: ICD-10-CM

## 2023-12-11 DIAGNOSIS — F03.90 DEMENTIA WITHOUT BEHAVIORAL DISTURBANCE (HCC): ICD-10-CM

## 2023-12-11 DIAGNOSIS — G20.A1 PARKINSON'S DISEASE: Primary | ICD-10-CM

## 2023-12-11 PROCEDURE — 99214 OFFICE O/P EST MOD 30 MIN: CPT | Performed by: PSYCHIATRY & NEUROLOGY

## 2023-12-11 RX ORDER — NYSTATIN 100000 U/G
OINTMENT TOPICAL 2 TIMES DAILY
COMMUNITY

## 2023-12-11 NOTE — ASSESSMENT & PLAN NOTE
Progression of parkinsonism and dementia. Exam with mild increase in rigidity and bradykinesia. Hallucinations have not increase in frequency and do not appear to be threatening most of the time. Will continue on carbidopa.levodopa  with no change. If rigidity and bradykinesia increase we can consider increasing levodopa and if hallucinations worsen we would add Nuplazid or quetiapine. We discussed the boxed warning of increased risk of death with the use of antipsychotics in the elderly with dementia. Episodes of staring off likely related to progression of dementia. If she develops prolonged periods of altered conscious which are increasing in frequency they are to contact the office. Will then consider EEG or trial of low dose AED.

## 2023-12-11 NOTE — TELEPHONE ENCOUNTER
Patient will be coming to  her form from [address] that shows information on her Prolia shot.  Form is in the brown accordion folder at the

## 2023-12-11 NOTE — PROGRESS NOTES
Patient ID: Zehra Arriaza is a 69 y.o. female    Assessment/Plan:    Hallucination, visual  Hallucinations which are     Parkinson's disease ([address])  Progression of parkinsonism and dementia. Exam with mild increase in rigidity and bradykinesia. Hallucinations have not increase in frequency and do not appear to be threatening most of the time. Will continue on carbidopa.levodopa  with no change. If rigidity and bradykinesia increase we can consider increasing levodopa and if hallucinations worsen we would add Nuplazid or quetiapine. We discussed the boxed warning of increased risk of death with the use of antipsychotics in the elderly with dementia. Episodes of staring off likely related to progression of dementia. If she develops prolonged periods of altered conscious which are increasing in frequency they are to contact the office. Will then consider EEG or trial of low dose AED. Dementia without behavioral disturbance (HCC)  Progression of DLB episodes of altered consciousness, variable sleep, hallucinations. Episodes of staring off likely related to progression of dementia. If she develops prolonged periods of altered conscious which are increasing in frequency they are to contact the office. Will then consider EEG or trial of low dose AED. Diagnoses and all orders for this visit:    Parkinson's disease    Dementia without behavioral disturbance ([address])    Hallucination, visual    Other orders  -     nystatin (MYCOSTATIN) ointment; Apply topically 2 (two) times a day        Subjective:      James Irvin is a right-handed woman with levodopa-responsive Parkinson's disease. To review, symptom onset in 2011 with left foot tremor, later complicated by non-motor wearing-off, slight dyskinesias, FoG. In Fall of 2011 she noted some balance trouble and later developed micrographia. She was started on Azilect 1mg with little notable improvement. Neupro patch later added.  Sinemet was added later and has been titrated up over time. At her last visit she had some worsening of her gait and freezing episodes with no benefit on higher rivastigmine dosing. Sinemet was slightly increased. She had a telemedicine visit with Dr Carol Ann Ravi at [facility] 4/22/22 to discuss MRI guided US. It was not felt to be a good option for her given primary use for tremor control and tremors not particularly bothersome to patient. She resides at [facility]. Note from [facility] her partner states she is noted to spend a lot of time staring at seams in clothing. If there is too much food on her plate, she will play with it. Eats better if given small portions at a time. She is not having "bad" hallucinations but will mention seeing things, will try to give others something in her fingers. Speech is soft, tends to whisper. She appears to have lost a lot of vocabulary words and can stop mid sentence when speaking. She no longer ambulates. She uses a wheelchair. She is assisted with dressing and showering. Current medications and timing:  - Sinemet 25/100; 2tabs qid (8am, 12:30, 4:30pm, 8:30pm)  - Namenda 10mg bid       Prior medication trials:  - Neupro Patch 2mg evening - stopped due to hallucinations, confusion and fatigue   - Azilect 0.5mg in the evening (stopped by PCP, gait worsened without it)  - Amantadine - side effects   - Exelon patch 9.5mg - itchy blister  - rivastigmine pill- GI side effects        Objective:    /68 (BP Location: Right arm, Patient Position: Sitting, Cuff Size: Adult)   Pulse (!) 51   Temp 98.2 °F (36.8 °C) (Temporal)   Ht 5' 3" (1.6 m)   Wt 59 kg (130 lb)   SpO2 98%   BMI 23.03 kg/m²       Physical Exam  Vitals reviewed. Eyes:      Extraocular Movements: Extraocular movements intact. Neurological:      Mental Status: She is alert. Neurological Exam  Mental Status  Alert. Oriented only to person and situation. Speech: hypophonia.  Follows simple command with repetition , demonstration and difficulty. Punding, reaching out and examining objects. No active hallucinations. Participates in conversations and answers simple question but can  . Cranial Nerves  CN III, IV, VI: Extraocular movements intact bilaterally. CN VII: Full and symmetric facial movement. CN VIII: Hearing is normal.  CN XI: Shoulder shrug strength is normal.  CN XII: Tongue midline without atrophy or fasciculations. Motor   Increased muscle tone. LLE. Strength is 5/5 in all four extremities except as noted. Handgrips 5-/5. Hip at least 4+/5. Not following command  Unable to stand independently due to postural instability. .    Coordination    See motor UPDRS. Gait   Unable to rise from chair without using arms. Able to arise partially with assistance. Retropulsion. Unable to stand. Felicitas Caba          UPDRS motor:                              Time since last dose:  12/11/23   Speech  1   Facial Expression  1   Rigidity - Neck     Rigidity - Upper Extremity (Right)  0   Rigidity - Upper Extremity (Left)   1   Rigidity - Lower Extremity (Right)  0   Rigidity - Lower Extremity (Left)   1   Finger Taps (Right)   1   Finger Taps (Left)   1   Hand Movement (Right)  0   Hand Movement (Left)   0   Pronation/Supination (Right)  2   Pronation/Supination (Left)   2   Toe Tapping (Right)    Toe Tapping (Left)    Leg Agility (Right)  1   Leg Agility (Left)   1   Arising from Chair   3   Gait   -    Freezing of Gait    Postural Stability   3   Posture 1   Global spontaneity of movement 1   Postural Tremor (Right) 0   Postural Tremor (Left) 0   Kinetic Tremor (Right)  0   Kinetic Tremor (Left)  0   Rest tremor amplitude RUE 0   Rest tremor amplitude LUE 0   Rest tremor amplitude RLE 0   Reset tremor amplitude LLE 0   Lip/Jaw Tremor  0   Consistency of tremor 0   Motor Exam Total:              Kayla Malcolm MD  Movement disorder physician  [address] [facility]

## 2023-12-11 NOTE — PROGRESS NOTES
Review of Systems   Constitutional:  Negative for appetite change, fatigue and fever. HENT: Negative. Negative for hearing loss, tinnitus, trouble swallowing and voice change. Eyes: Negative. Negative for photophobia, pain and visual disturbance. Respiratory: Negative. Negative for shortness of breath. Cardiovascular: Negative. Negative for palpitations. Gastrointestinal: Negative. Negative for nausea and vomiting. Endocrine: Negative. Negative for cold intolerance. Genitourinary: Negative. Negative for dysuria, frequency and urgency. Musculoskeletal:  Negative for back pain, gait problem, myalgias and neck pain. Skin: Negative. Negative for rash. Allergic/Immunologic: Negative. Neurological:  Positive for weakness. Negative for dizziness, tremors, seizures, syncope, facial asymmetry, speech difficulty, light-headedness, numbness and headaches. Hematological: Negative. Does not bruise/bleed easily. Psychiatric/Behavioral:  Positive for confusion. Negative for hallucinations and sleep disturbance. All other systems reviewed and are negative.

## 2023-12-11 NOTE — ASSESSMENT & PLAN NOTE
Progression of DLB episodes of altered consciousness, variable sleep, hallucinations. Episodes of staring off likely related to progression of dementia. If she develops prolonged periods of altered conscious which are increasing in frequency they are to contact the office. Will then consider EEG or trial of low dose AED.

## 2024-01-08 ENCOUNTER — TELEPHONE (OUTPATIENT)
Age: 79
End: 2024-01-08

## 2024-01-08 DIAGNOSIS — R19.7 DIARRHEA OF PRESUMED INFECTIOUS ORIGIN: Primary | ICD-10-CM

## 2024-01-08 RX ORDER — LOPERAMIDE HYDROCHLORIDE 2 MG/1
2 CAPSULE ORAL 3 TIMES DAILY PRN
Qty: 30 CAPSULE | Refills: 0 | Status: SHIPPED | OUTPATIENT
Start: 2024-01-08

## 2024-01-08 NOTE — TELEPHONE ENCOUNTER
Ordered.  If she develops fever, bloody diarrhea, persistent diarrhea or abdominal pain she needs to be evaluated.

## 2024-01-12 ENCOUNTER — TELEPHONE (OUTPATIENT)
Age: 79
End: 2024-01-12

## 2024-01-12 NOTE — TELEPHONE ENCOUNTER
This is [facility] at [facility]. I'm calling about a patient of Doctor Ezra Avila KN IG HT. Her date of birth is [birth year]. I'm calling for an order for Hospice. Her family has chosen to go with [facility] and they sign consents but they need they want to sign consents today, but they need an order. [facility] needs an order. It's for Clarita Avila, date of birth [birth year]. I need a Hospice order. My fax number is [phone]. I mean the fax number. Tamela, [phone]. I'd like to have that Hospice order today if I could. It's January 12th, 1:57. Thank you.

## 2024-02-21 PROBLEM — N39.0 FREQUENT UTI: Status: RESOLVED | Noted: 2021-07-02 | Resolved: 2024-02-21

## 2024-06-07 ENCOUNTER — TELEPHONE (OUTPATIENT)
Age: 79
End: 2024-06-07